# Patient Record
Sex: MALE | Race: WHITE | NOT HISPANIC OR LATINO
[De-identification: names, ages, dates, MRNs, and addresses within clinical notes are randomized per-mention and may not be internally consistent; named-entity substitution may affect disease eponyms.]

---

## 2017-05-05 PROBLEM — Z00.00 ENCOUNTER FOR PREVENTIVE HEALTH EXAMINATION: Status: ACTIVE | Noted: 2017-05-05

## 2017-08-11 ENCOUNTER — APPOINTMENT (OUTPATIENT)
Age: 57
End: 2017-08-11

## 2017-10-12 ENCOUNTER — APPOINTMENT (OUTPATIENT)
Dept: PODIATRY | Facility: CLINIC | Age: 57
End: 2017-10-12

## 2018-10-07 ENCOUNTER — TRANSCRIPTION ENCOUNTER (OUTPATIENT)
Age: 58
End: 2018-10-07

## 2018-10-08 ENCOUNTER — INPATIENT (INPATIENT)
Facility: HOSPITAL | Age: 58
LOS: 15 days | Discharge: HOME | DRG: 853 | End: 2018-10-24
Attending: SURGERY | Admitting: HOSPITALIST
Payer: MEDICAID

## 2018-10-08 ENCOUNTER — RESULT REVIEW (OUTPATIENT)
Age: 58
End: 2018-10-08

## 2018-10-08 VITALS — DIASTOLIC BLOOD PRESSURE: 91 MMHG | SYSTOLIC BLOOD PRESSURE: 149 MMHG

## 2018-10-08 DIAGNOSIS — I48.91 UNSPECIFIED ATRIAL FIBRILLATION: ICD-10-CM

## 2018-10-08 LAB
ABO RH CONFIRMATION: SIGNIFICANT CHANGE UP
ALBUMIN SERPL ELPH-MCNC: 3.1 G/DL — LOW (ref 3.3–5.2)
ALP SERPL-CCNC: 86 U/L — SIGNIFICANT CHANGE UP (ref 40–120)
ALT FLD-CCNC: 6 U/L — SIGNIFICANT CHANGE UP
ANION GAP SERPL CALC-SCNC: 18 MMOL/L — HIGH (ref 5–17)
APTT BLD: 27.2 SEC — LOW (ref 27.5–37.4)
AST SERPL-CCNC: 11 U/L — SIGNIFICANT CHANGE UP
BASOPHILS # BLD AUTO: 0 K/UL — SIGNIFICANT CHANGE UP (ref 0–0.2)
BASOPHILS NFR BLD AUTO: 0.1 % — SIGNIFICANT CHANGE UP (ref 0–2)
BILIRUB SERPL-MCNC: 1.1 MG/DL — SIGNIFICANT CHANGE UP (ref 0.4–2)
BLD GP AB SCN SERPL QL: SIGNIFICANT CHANGE UP
BUN SERPL-MCNC: 12 MG/DL — SIGNIFICANT CHANGE UP (ref 8–20)
CALCIUM SERPL-MCNC: 8.7 MG/DL — SIGNIFICANT CHANGE UP (ref 8.6–10.2)
CHLORIDE SERPL-SCNC: 94 MMOL/L — LOW (ref 98–107)
CK SERPL-CCNC: 31 U/L — SIGNIFICANT CHANGE UP (ref 30–200)
CO2 SERPL-SCNC: 20 MMOL/L — LOW (ref 22–29)
CREAT SERPL-MCNC: 1.47 MG/DL — HIGH (ref 0.5–1.3)
EOSINOPHIL # BLD AUTO: 0 K/UL — SIGNIFICANT CHANGE UP (ref 0–0.5)
EOSINOPHIL NFR BLD AUTO: 0.4 % — SIGNIFICANT CHANGE UP (ref 0–5)
GLUCOSE SERPL-MCNC: 127 MG/DL — HIGH (ref 70–115)
GRAM STN FLD: SIGNIFICANT CHANGE UP
HCT VFR BLD CALC: 46 % — SIGNIFICANT CHANGE UP (ref 42–52)
HGB BLD-MCNC: 13.5 G/DL — LOW (ref 14–18)
INR BLD: 1.32 RATIO — HIGH (ref 0.88–1.16)
LACTATE BLDV-MCNC: 12.7 MMOL/L — CRITICAL HIGH (ref 0.5–2)
LACTATE BLDV-MCNC: 5.7 MMOL/L — CRITICAL HIGH (ref 0.5–2)
LACTATE BLDV-MCNC: 6.5 MMOL/L — CRITICAL HIGH (ref 0.5–2)
LYMPHOCYTES # BLD AUTO: 0.8 K/UL — LOW (ref 1–4.8)
LYMPHOCYTES # BLD AUTO: 11.4 % — LOW (ref 20–55)
MCHC RBC-ENTMCNC: 26.9 PG — LOW (ref 27–31)
MCHC RBC-ENTMCNC: 29.3 G/DL — LOW (ref 32–36)
MCV RBC AUTO: 91.8 FL — SIGNIFICANT CHANGE UP (ref 80–94)
MONOCYTES # BLD AUTO: 0.4 K/UL — SIGNIFICANT CHANGE UP (ref 0–0.8)
MONOCYTES NFR BLD AUTO: 5.5 % — SIGNIFICANT CHANGE UP (ref 3–10)
NEUTROPHILS # BLD AUTO: 5.7 K/UL — SIGNIFICANT CHANGE UP (ref 1.8–8)
NEUTROPHILS NFR BLD AUTO: 82.5 % — HIGH (ref 37–73)
NT-PROBNP SERPL-SCNC: 5008 PG/ML — HIGH (ref 0–300)
PLATELET # BLD AUTO: 441 K/UL — HIGH (ref 150–400)
POTASSIUM SERPL-MCNC: 4.3 MMOL/L — SIGNIFICANT CHANGE UP (ref 3.5–5.3)
POTASSIUM SERPL-SCNC: 4.3 MMOL/L — SIGNIFICANT CHANGE UP (ref 3.5–5.3)
PROT SERPL-MCNC: 6.7 G/DL — SIGNIFICANT CHANGE UP (ref 6.6–8.7)
PROTHROM AB SERPL-ACNC: 14.6 SEC — HIGH (ref 9.8–12.7)
RBC # BLD: 5.01 M/UL — SIGNIFICANT CHANGE UP (ref 4.6–6.2)
RBC # FLD: 16.7 % — HIGH (ref 11–15.6)
SODIUM SERPL-SCNC: 132 MMOL/L — LOW (ref 135–145)
SPECIMEN SOURCE: SIGNIFICANT CHANGE UP
TROPONIN T SERPL-MCNC: 0.01 NG/ML — SIGNIFICANT CHANGE UP (ref 0–0.06)
TYPE + AB SCN PNL BLD: SIGNIFICANT CHANGE UP
WBC # BLD: 6.9 K/UL — SIGNIFICANT CHANGE UP (ref 4.8–10.8)
WBC # FLD AUTO: 6.9 K/UL — SIGNIFICANT CHANGE UP (ref 4.8–10.8)

## 2018-10-08 PROCEDURE — 71275 CT ANGIOGRAPHY CHEST: CPT | Mod: 26

## 2018-10-08 PROCEDURE — 51703 INSERT BLADDER CATH COMPLEX: CPT

## 2018-10-08 PROCEDURE — 47600 CHOLECYSTECTOMY: CPT

## 2018-10-08 PROCEDURE — 99291 CRITICAL CARE FIRST HOUR: CPT | Mod: 25

## 2018-10-08 PROCEDURE — 93010 ELECTROCARDIOGRAM REPORT: CPT

## 2018-10-08 PROCEDURE — 99291 CRITICAL CARE FIRST HOUR: CPT

## 2018-10-08 PROCEDURE — 71045 X-RAY EXAM CHEST 1 VIEW: CPT | Mod: 26

## 2018-10-08 PROCEDURE — 74177 CT ABD & PELVIS W/CONTRAST: CPT | Mod: 26

## 2018-10-08 PROCEDURE — 76937 US GUIDE VASCULAR ACCESS: CPT | Mod: 26

## 2018-10-08 PROCEDURE — 44602 SUTURE SMALL INTESTINE: CPT | Mod: 59

## 2018-10-08 PROCEDURE — 88304 TISSUE EXAM BY PATHOLOGIST: CPT | Mod: 26

## 2018-10-08 PROCEDURE — 36620 INSERTION CATHETER ARTERY: CPT

## 2018-10-08 PROCEDURE — 36556 INSERT NON-TUNNEL CV CATH: CPT

## 2018-10-08 RX ORDER — MORPHINE SULFATE 50 MG/1
4 CAPSULE, EXTENDED RELEASE ORAL EVERY 6 HOURS
Qty: 0 | Refills: 0 | Status: DISCONTINUED | OUTPATIENT
Start: 2018-10-08 | End: 2018-10-08

## 2018-10-08 RX ORDER — AZITHROMYCIN 500 MG/1
500 TABLET, FILM COATED ORAL ONCE
Qty: 0 | Refills: 0 | Status: COMPLETED | OUTPATIENT
Start: 2018-10-08 | End: 2018-10-08

## 2018-10-08 RX ORDER — MORPHINE SULFATE 50 MG/1
6 CAPSULE, EXTENDED RELEASE ORAL ONCE
Qty: 0 | Refills: 0 | Status: DISCONTINUED | OUTPATIENT
Start: 2018-10-08 | End: 2018-10-08

## 2018-10-08 RX ORDER — ACETAMINOPHEN 500 MG
650 TABLET ORAL EVERY 6 HOURS
Qty: 0 | Refills: 0 | Status: DISCONTINUED | OUTPATIENT
Start: 2018-10-08 | End: 2018-10-08

## 2018-10-08 RX ORDER — FUROSEMIDE 40 MG
40 TABLET ORAL
Qty: 0 | Refills: 0 | Status: DISCONTINUED | OUTPATIENT
Start: 2018-10-08 | End: 2018-10-08

## 2018-10-08 RX ORDER — SODIUM CHLORIDE 9 MG/ML
2000 INJECTION INTRAMUSCULAR; INTRAVENOUS; SUBCUTANEOUS ONCE
Qty: 0 | Refills: 0 | Status: COMPLETED | OUTPATIENT
Start: 2018-10-08 | End: 2018-10-08

## 2018-10-08 RX ORDER — CALCIUM CARBONATE 500(1250)
1 TABLET ORAL
Qty: 0 | Refills: 0 | Status: DISCONTINUED | OUTPATIENT
Start: 2018-10-08 | End: 2018-10-08

## 2018-10-08 RX ORDER — CEFTRIAXONE 500 MG/1
1 INJECTION, POWDER, FOR SOLUTION INTRAMUSCULAR; INTRAVENOUS ONCE
Qty: 0 | Refills: 0 | Status: COMPLETED | OUTPATIENT
Start: 2018-10-08 | End: 2018-10-08

## 2018-10-08 RX ORDER — LANOLIN ALCOHOL/MO/W.PET/CERES
5 CREAM (GRAM) TOPICAL AT BEDTIME
Qty: 0 | Refills: 0 | Status: DISCONTINUED | OUTPATIENT
Start: 2018-10-08 | End: 2018-10-08

## 2018-10-08 RX ORDER — FUROSEMIDE 40 MG
40 TABLET ORAL ONCE
Qty: 0 | Refills: 0 | Status: COMPLETED | OUTPATIENT
Start: 2018-10-08 | End: 2018-10-08

## 2018-10-08 RX ORDER — SODIUM CHLORIDE 9 MG/ML
1000 INJECTION INTRAMUSCULAR; INTRAVENOUS; SUBCUTANEOUS
Qty: 0 | Refills: 0 | Status: DISCONTINUED | OUTPATIENT
Start: 2018-10-08 | End: 2018-10-08

## 2018-10-08 RX ORDER — METOPROLOL TARTRATE 50 MG
50 TABLET ORAL
Qty: 0 | Refills: 0 | Status: DISCONTINUED | OUTPATIENT
Start: 2018-10-08 | End: 2018-10-08

## 2018-10-08 RX ORDER — HYDROMORPHONE HYDROCHLORIDE 2 MG/ML
1 INJECTION INTRAMUSCULAR; INTRAVENOUS; SUBCUTANEOUS ONCE
Qty: 0 | Refills: 0 | Status: DISCONTINUED | OUTPATIENT
Start: 2018-10-08 | End: 2018-10-08

## 2018-10-08 RX ORDER — PIPERACILLIN AND TAZOBACTAM 4; .5 G/20ML; G/20ML
4.5 INJECTION, POWDER, LYOPHILIZED, FOR SOLUTION INTRAVENOUS EVERY 8 HOURS
Qty: 0 | Refills: 0 | Status: DISCONTINUED | OUTPATIENT
Start: 2018-10-08 | End: 2018-10-08

## 2018-10-08 RX ORDER — PIPERACILLIN AND TAZOBACTAM 4; .5 G/20ML; G/20ML
3.38 INJECTION, POWDER, LYOPHILIZED, FOR SOLUTION INTRAVENOUS EVERY 8 HOURS
Qty: 0 | Refills: 0 | Status: DISCONTINUED | OUTPATIENT
Start: 2018-10-08 | End: 2018-10-10

## 2018-10-08 RX ORDER — SODIUM CHLORIDE 9 MG/ML
1000 INJECTION INTRAMUSCULAR; INTRAVENOUS; SUBCUTANEOUS ONCE
Qty: 0 | Refills: 0 | Status: DISCONTINUED | OUTPATIENT
Start: 2018-10-08 | End: 2018-10-08

## 2018-10-08 RX ORDER — DILTIAZEM HCL 120 MG
5 CAPSULE, EXT RELEASE 24 HR ORAL
Qty: 125 | Refills: 0 | Status: DISCONTINUED | OUTPATIENT
Start: 2018-10-08 | End: 2018-10-09

## 2018-10-08 RX ORDER — ACETAMINOPHEN 500 MG
975 TABLET ORAL ONCE
Qty: 0 | Refills: 0 | Status: COMPLETED | OUTPATIENT
Start: 2018-10-08 | End: 2018-10-08

## 2018-10-08 RX ADMIN — HYDROMORPHONE HYDROCHLORIDE 1 MILLIGRAM(S): 2 INJECTION INTRAMUSCULAR; INTRAVENOUS; SUBCUTANEOUS at 16:02

## 2018-10-08 RX ADMIN — AZITHROMYCIN 500 MILLIGRAM(S): 500 TABLET, FILM COATED ORAL at 12:22

## 2018-10-08 RX ADMIN — Medication 100 MILLIGRAM(S): at 11:26

## 2018-10-08 RX ADMIN — CEFTRIAXONE 1 GRAM(S): 500 INJECTION, POWDER, FOR SOLUTION INTRAMUSCULAR; INTRAVENOUS at 10:15

## 2018-10-08 RX ADMIN — CEFTRIAXONE 100 GRAM(S): 500 INJECTION, POWDER, FOR SOLUTION INTRAMUSCULAR; INTRAVENOUS at 09:17

## 2018-10-08 RX ADMIN — SODIUM CHLORIDE 1000 MILLILITER(S): 9 INJECTION INTRAMUSCULAR; INTRAVENOUS; SUBCUTANEOUS at 11:20

## 2018-10-08 RX ADMIN — Medication 40 MILLIGRAM(S): at 08:26

## 2018-10-08 RX ADMIN — Medication 600 MILLIGRAM(S): at 11:26

## 2018-10-08 RX ADMIN — MORPHINE SULFATE 6 MILLIGRAM(S): 50 CAPSULE, EXTENDED RELEASE ORAL at 14:22

## 2018-10-08 RX ADMIN — AZITHROMYCIN 255 MILLIGRAM(S): 500 TABLET, FILM COATED ORAL at 10:19

## 2018-10-08 RX ADMIN — Medication 975 MILLIGRAM(S): at 10:27

## 2018-10-08 RX ADMIN — Medication 5 MG/HR: at 08:34

## 2018-10-08 RX ADMIN — Medication 975 MILLIGRAM(S): at 09:16

## 2018-10-08 RX ADMIN — Medication 50 MILLIGRAM(S): at 10:51

## 2018-10-08 NOTE — ED PROVIDER NOTE - CARE PLAN
Principal Discharge DX:	Atrial fibrillation with RVR  Secondary Diagnosis:	Pneumonia of right lower lobe due to infectious organism  Secondary Diagnosis:	Sepsis, due to unspecified organism Principal Discharge DX:	Perforated viscus  Secondary Diagnosis:	Pneumonia of right lower lobe due to infectious organism  Secondary Diagnosis:	Sepsis, due to unspecified organism

## 2018-10-08 NOTE — BRIEF OPERATIVE NOTE - POST-OP DX
Perforated duodenal ulcer  10/08/2018    Active  Kathryn Pizano  Phimosis  10/08/2018    Active  David Raymond
Perforated duodenal ulcer  10/08/2018    Active  Kathryn Pizano

## 2018-10-08 NOTE — H&P ADULT - ASSESSMENT
59 y/o M unknown medical history presents with clinical and radiographic evidence of bowel perforation with abdominal free air. Evidence of severe sepsis on arrival, with fever, tachycardia and signs of end organ dysfunction (TALA, atrial fibrillation with RVR). Patient also has evidence of right lower lobe infiltrate, possibly pneumonia. Lactic acidosis worsening (12.7 from 5.7, despite fluid resuscitation). Due to clinical picture of likely bowel perforation with severe sepsis, patient will be consented for emergent operative exploration.    Plan:  -Admit to [facility], Dr. Branham  -OR for emergent exploratory laparotomy  -Khan catheter placement  -NGT placement  -NPO/IVF resuscitation  -Zosyn IV  -Dispo: SICU expecting patient post-op

## 2018-10-08 NOTE — ED ADULT NURSE NOTE - OBJECTIVE STATEMENT
PT BIBA c/o abdominal pain and unable to move with difficulty breathing. Per pt's significant other pt had difficulty breathing and unable to due to abdominal pain and groin.  Pt's significant other states pt had swollen testicles last week.  Pt with bi-pap in place. O2 saturation 100% Heart rate improving.

## 2018-10-08 NOTE — ED PROVIDER NOTE - OBJECTIVE STATEMENT
58 year old male with no PMH presents with SOb and abd distension. pt states that he has had 3 days of abd distension and then today woke up SOB. Sx are constant, worse with exertion. He denies chest pain, cough, fever, chills, vomiting, diarrhea but does endorse associated leg swelling.

## 2018-10-08 NOTE — BRIEF OPERATIVE NOTE - PROCEDURE
<<-----Click on this checkbox to enter Procedure Urethral catheterization  10/08/2018  complicated  Active  [email or web address]

## 2018-10-08 NOTE — CONSULT NOTE ADULT - SUBJECTIVE AND OBJECTIVE BOX
[facility]                                          [address] , [address], [place] [address]                                               PHONE: [phone]    FAX: [phone] [address], [address], [place] [address]                                       PHONE: [phone]    FAX: [phone]  *******************************************************************************    Reason for Consult: Congestive Heart Failure    HPI:  TAI RUBI is a 58y man with significant history of morbid obesity and smoking who presents to the ER for evaluation of worsening LE swelling and shortness of breath.  The patient states he has not seen a doctor in many years.  He states that he noticed worsening leg swelling over the last several weeks and "went to the drug store and bought a water pill which did not work."  He also "tried keeping legs elevated" but states that this also did not help.  He now reports swelling all the way up to abdomen and significant orthopnea and PND.  No chest pain or palpitations.  No syncope or near syncope.     PAST MEDICAL & SURGICAL HISTORY:  No pertinent past medical history  No significant past surgical history      No Known Allergies      MEDICATIONS  (STANDING):  azithromycin  IVPB 500 milliGRAM(s) IV Intermittent Once  diltiazem Infusion 5 mG/Hr (5 mL/Hr) IV Continuous <Continuous>    MEDICATIONS  (PRN):      Social History: no active tobacco / EtOH / IVDA    Family History: No pertinent family history in first degree relatives      ROS: As noted above, otherwise unremarkable.    Vital Signs Last 24 Hrs  T(C): 38.4 (08 Oct 2018 08:24), Max: 38.4 (08 Oct 2018 08:24)  T(F): 101.1 (08 Oct 2018 08:24), Max: 101.1 (08 Oct 2018 08:24)  HR: 123 (08 Oct 2018 08:24) (123 - 190)  BP: 118/71 (08 Oct 2018 08:24) (118/71 - 149/91)  RR: 26 (08 Oct 2018 08:24) (26 - 30)  SpO2: 100% (08 Oct 2018 08:24) (85% - 100%)     PHYSICAL EXAM:  General: Appears well developed, well nourished, no acute distress  HEENT: Head: normocephalic, atraumatic  Eyes: Pupils equal and reactive  Neck: obese but supple, no carotid bruit, no JVD, no HJR  CARDIOVASCULAR: Irregularly irregular tachycardia; no murmurs appreciated although difficult to auscultate.   LUNGS: Clear to auscultation bilaterally, no rales, rhonchi or wheeze  ABDOMEN: obese but soft, nontender, non-distended, positive bowel sounds, no mass or bruit  EXTREMITIES: 3+ b/l LE pitting edema/anasarca.   SKIN: Warm and dry with normal turgor  NEURO: Alert & oriented x 3, grossly intact  PSYCH: normal mood and affect    LABS:                        13.5   6.9   )-----------( 441      ( 08 Oct 2018 08:26 )             46.0     PT/INR - ( 08 Oct 2018 08:26 )   PT: 14.6 sec;   INR: 1.32 ratio         PTT - ( 08 Oct 2018 08:26 )  PTT:27.2 sec  Serum Pro-Brain Natriuretic Peptide: 5008 pg/mL (10-08 @ 08:26)      RADIOLOGY & ADDITIONAL STUDIES:    ECG: Atrial fibrillation with rapid ventricular rate 180's/min.    Assessment and Plan:  In summary, TAI RUBI is a 58y man with significant history of morbid obesity and smoking seen in the ER for evaluation of worsening LE swelling and shortness of breath, likely secondary to acute (likely on chronic) HF (unknown systolic function), found to be in rapid atrial fibrillation.     - Monitor on telemetry  - Check troponin x3  - Repeat EKG  - Echocardiogram  - Pending results of chemistry, will plan to start IV diuresis with Lasix 80 mg IV q12h for now.  Monitor repeat CXRs, strict I/Os, daily weights, & BUN/Cr closely and titrate PRN. Discussed with ER team.   - No evidence of coronary ischemia clinically.  Troponin pending.  Continue medical management for now pending the results of the above.  Plan for eventual ischemic evaluation, type and timing pending completion of the above work up.   - Rhythm/hemodynamic: rapid atrial fibrillation.  Rate improved on cardizem drip.  Titrate to maintain resting HR <110/min.  Will start metoprolol 50 mg PO bid for now and titrate as tolerated pending echo results.   - Keep K > 4, Mg > 2 (labs pending).   - Check TSH  - Check lipids; pending chemistry results will likely start statin  - Morbid obesity; suspect untreated MELISA.  Will need sleep study in early outpatient setting.    We will follow with you.  Thank you for allowing me to participate in the care of your patient.      Sincerely,    Efrain Hansen M.D.

## 2018-10-08 NOTE — ED ADULT NURSE REASSESSMENT NOTE - NS ED NURSE REASSESS COMMENT FT1
Attempted to place Khan catheter unable to advance catheter into urinary bladder Dr Branham at bedside, aware, per MD will attempt to place when in OR.  The OR is ready for pt waiting for transport

## 2018-10-08 NOTE — ED ADULT TRIAGE NOTE - CHIEF COMPLAINT QUOTE
severe resp distress  +4 piiting edema to all 4 extremities  distended abd severe resp distress  +4 piiting edema to all 4 extremities  distended abd,   dr goel at bedside upon arrival

## 2018-10-08 NOTE — ED PROVIDER NOTE - MEDICAL DECISION MAKING DETAILS
Pt with acute CHF, afib, pna/sepsis. Lactate is downtrending. Will require admission for rate control, Abx, diuresis

## 2018-10-08 NOTE — BRIEF OPERATIVE NOTE - OPERATION/FINDINGS
marked phimosis, redundant prepuce
Perforated duodenal ulcer measuring 8 mm repaired with tara patch, succus throughout abdomen, inflamed gallbladder

## 2018-10-08 NOTE — ED ADULT NURSE REASSESSMENT NOTE - NS ED NURSE REASSESS COMMENT FT1
Pt remains A & OX4, respirations continue to be labored. Pt with bi-pap in place. pt appears anxious. HR slightly improving

## 2018-10-08 NOTE — ED ADULT NURSE REASSESSMENT NOTE - NS ED NURSE REASSESS COMMENT FT1
Pt medicated as per cardiology orders with PO lopressor, tolerated well.  IV Azithromycin continues infusing w/o incident, pt family at bedside

## 2018-10-08 NOTE — H&P ADULT - ATTENDING COMMENTS
Patient seen and examined.   a week of abdominal  pain, now with worsening LA and abdominal pain.  on exam non toxic at initial evaluation, whoever later decompensated.  abdomen distended, obese tympanic, tender but no peritoneal signs  CT with pneumoperitoneum and free fluid, air around duodenum and at GB suspicious for perforated duodenal ulcer.  Plan to take to OR for exploration.  I discussed with patient that this could be lethal if left untreated, discussed procedure, alternatives and benefits.  because we don't fully know his medical history his morbi mortality is elevated in the 30%  '

## 2018-10-08 NOTE — ED PROVIDER NOTE - PROGRESS NOTE DETAILS
We have attempted to obtain CT on the pt on 2 separate occasions. First, he stated his abd pain was too severe t lay flat. Second time, he became acutely dyspnic while laying flat. Will place pt on bipap again in an attempt to obtain CT. Surgery consult called for abd pain in setting of elevated lactate. They do not believe this to be mesenteric ischemia at this time. They will follow and agree with trending lactate.  Hospitalist declines admission until CT has returned. Lactate now significantly worse at 12. Pt is currently in the CT Lactate now significantly worse at 12. Pt is currently in the CT. Surgery called

## 2018-10-08 NOTE — H&P ADULT - NSHPPHYSICALEXAM_GEN_ALL_CORE
General: mild distress, AOx3, uncomfortable on examination  HEENT: PERRLA, EOMI, dry mucous membranes  Neck: supple, nontender  Cardiovascular: regular rate, irregularly irregular rhythm, no murmurs  Respiratory: moderate to severe respiratory distress, dyspneic with short sentences, on bipap. Coarse breath sounds bilaterally.  Abdomen: soft, mild diffuse tenderness. Severe distention with tympany to tap. No guarding or rebound.  Extremities: 3+ bilateral pitting edema. Normal ROM.  Integumentary: warm, no rash  Neuro: no motor or sensory deficits

## 2018-10-08 NOTE — CHART NOTE - NSCHARTNOTEFT_GEN_A_CORE
Pt was given to the hospitalist service but CT A/P & CT angio chest was pending. Pt was appearing to be more tachypneic & dyspneic requiring new BiPAP. Dr. Gordon was  called back who will take back the admission to finish the w/u & call ICU

## 2018-10-08 NOTE — CONSULT NOTE ADULT - ASSESSMENT
58 yr old male w morbid obesity presented to [facility] w fever 101.2, hypoxic 85% on room air w     1) New atrial fib w rapid Ventricular response reported  s/p cardizem 30+20 IV on drip 10 mg   1. admit to telemetry  2. cardiology consulted  3. check lipids in AM  4. delaying statin for today  5. lopressor 50 mg po 2 times a day  6. CHADS-VASC 1;  need weight to begin AC  7. checking Mg    2) acute CHF  1. daily weights  2. lasix 40 mg IV 2 times a day  3. check TSH  4. BNP on 10-10  5. checking B12 and folate    3) Possible PNA on R seen on CXR  with hypoxemia on room air  with metabolic acidosis w elevated lactate  1. received IV bolus  2. w diuresis will improve renal and cardo-pum fx  3. repeat lactate in AM  4. follow up cx  5. procalcitonin today  6. continue zithromax as 500 mg po daily starting in AM  7. IV ceftriaxone 1 gram  8. monitor pulse ox  9. continue supplemental oxygen  10 . will likely need sleep study as outpt    4) acute kidney injury  Cr 1.47 w unknown baseline  likely due to CHF and AF in addition to possibility of infection w PNA  1. monitor    5) morbid obesity  1> Hb A1c  2. TSH  3. lipids as above  4. dietician    VTE  unable to order full AC for AF at this time pending weight 58 yr old male w morbid obesity presented to [facility] w fever 101.2, hypoxic 85% on room air w     1) New atrial fib w rapid Ventricular response reported  s/p cardizem 30+20 IV on drip 10 mg   1. admit to telemetry  2. cardiology consulted  3. check lipids in AM  4. delaying statin for today  5. lopressor 50 mg po 2 times a day  6. CHADS-VASC 1;  need weight to begin AC  7. checking Mg    2) acute CHF  1. daily weights  2. lasix 40 mg IV 2 times a day  3. check TSH  4. BNP on 10-10  5. checking B12 and folate    3) Possible PNA on R seen on CXR  with hypoxemia on room air  with metabolic acidosis w elevated lactate  1. received IV bolus  2. w diuresis will improve renal and cardo-pum fx  3. repeat lactate in AM  4. follow up cx  5. procalcitonin today  6. continue zithromax as 500 mg po daily starting in AM  7. IV ceftriaxone 1 gram  8. monitor pulse ox  9. continue supplemental oxygen  10 . will likely need sleep study as outpt    4) acute kidney injury  Cr 1.47 w unknown baseline  likely due to CHF and AF in addition to possibility of infection w PNA  1. monitor BMP    5) Abdominal pain  may be due to edema/CHF but in setting of atrial fib be concerned w ischemic bowel  pt reported initially unable to tolerate lying down for CT  2. abd US ordered  3. now CT w contrast    ) morbid obesity  1> Hb A1c  2. TSH  3. lipids as above  4. dietician    VTE  unable to order full AC for AF at this time pending weight 58 yr old male w morbid obesity presented to [facility] w fever 101.2, hypoxic 85% on room air w     1) New atrial fib w rapid Ventricular response reported  s/p cardizem 30+20 IV on drip 10 mg   1. admit to telemetry  2. cardiology consulted  3. check lipids in AM  4. delaying statin for today  5. lopressor 50 mg po 2 times a day  6. CHADS-VASC 1;  need weight to begin AC  7. checking Mg    2) acute CHF  1. daily weights  2. lasix 40 mg IV 2 times a day  3. check TSH  4. BNP on 10-10  5. checking B12 and folate    3) Abdominal pain  may be due to edema/CHF but in setting of atrial fib be concerned w ischemic bowel  pt reported initially unable to tolerate lying down for CT  anorexic w nausea and no additional vomiting  2. abd US ordered  3. now CT w contrast  4. spoke w ED to request surgery to see re possible ischemic colitis  5. NPO except meds      4) Possible PNA on R seen on CXR  with hypoxemia on room air  with metabolic acidosis w elevated lactate  1. received IV bolus  2. w diuresis will improve renal and cardo-pum fx  3. repeat lactate in AM  4. follow up cx  5. procalcitonin today  6. continue zithromax as 500 mg po daily starting in AM  7. IV ceftriaxone 1 gram  8. monitor pulse ox  9. continue supplemental oxygen  10 . will likely need sleep study as outpt    5) acute kidney injury  Cr 1.47 w unknown baseline  likely due to CHF and AF in addition to possibility of infection w PNA  1. monitor BMP    6) morbid obesity  1> Hb A1c  2. TSH  3. lipids as above  4. dietician    VTE  deferred until seen by surgery

## 2018-10-08 NOTE — CONSULT NOTE ADULT - SUBJECTIVE AND OBJECTIVE BOX
58 yr old male w morbid obesity and no medical follow up x many years came to [facility] ED by EMS c/o SOB, leg edema and abdominal distension    + several week hx of bilateral leg edema  Went to pharmacy for OTC diuretic (name unknown).  Elevated his legs and edema improved.  Recurred then progressed over the past 7-10 days.  Now up to the abdominal wall  +SOB at rest and increases w exertion  +orthopnea after a few seconds reclining;  has been sleeping upright for 2 days  +PND at night x 2 days  +chills last week and this AM;  no travel hx and no sick contacts    On ED arrival O2 sat 85% on room air w RR 30 /80 w .  He was found in atrial fib.  Had received cardizem IV 30 mg then 20 mg and was placed on IV drip initially at 5 then increased to 10 mg.  Cardiology was consulted and Lactate was 6 He was given 2 L NS, acetaminophen 975 mg.  Inidicated second lactate 5  Initially given BiPAP but was unable to tolerate.  More comfortable on nasal cannula  His exam showed lower extremity edema bilaterally to abdominal wall.          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     Ex-smoker quit 2 months ago;  smoked 2-3 cigarettes on weekends x 10 yrs  Stopped drinking beer 6 months ago;  occasional wine on weekends    Has girlfriend who was present at the bedside      ROS  GEN No fever until in ED today  + chills last week and then this morning, + anorexia for a few days  HEENT has no complaints  Resp + cough productive of clear to light beige sputum + SOB at rest  CARD + occasional palpitations intermittent  + Dyspnea now on minimal exertion progressing to at rest  GI + nausea  + vomiting a few days ago/ vomited his just eaten food, now clear fluid occasonally, + periumbilical pain and LUQ pain when he coughs   urinates"normally"  MSK no s    PHYSICAL EXAM: Tele-evaluation precludes physical exam. Pertinent physical exam findings as per verbal communication by Dr. Bertha hayes bibasilar rales, +pitting edema from lower legs to abdominal wall    Vital Signs Last 24 Hrs  T(C): 37.5 (08 Oct 2018 10:09), Max: 38.4 (08 Oct 2018 08:24)  T(F): 99.5 (08 Oct 2018 10:09), Max: 101.1 (08 Oct 2018 08:24)  HR: 111 (08 Oct 2018 13:30) (111 - 190)  BP: 119/56 (08 Oct 2018 10:19) (118/71 - 149/91)  BP(mean): --  RR: 28 (08 Oct 2018 10:19) (26 - 30)  SpO2: 96% (08 Oct 2018 10:19) (85% - 100%)      LABS AND IMAGING DATA:                        13.5   6.9   )-----------( 441      ( 08 Oct 2018 08:26 )             46.0     10-08    132<L>  |  94<L>  |  12.0  ----------------------------<  127<H>  4.3   |  20.0<L>  |  1.47<H>    Ca    8.7      08 Oct 2018 10:01    TPro  6.7  /  Alb  3.1<L>  /  TBili  1.1  /  DBili  x   /  AST  11  /  ALT  6   /  AlkPhos  86  10-08      LACTATE  6.5,   5.7    BNP 5008  troponin neg x 1      CXR  INTERPRETATION:  History: Dyspnea.    Technique:  AP portable    Comparisons:  none    Findings:     Infiltrates and atelectasis are present at the right lung   base with possible right pleural effusion. The left lung is clear.  .      The pulmonary vasculature and aorta are normal for age. Heart size is   unremarkable.     The thorax is normal for age.    Impression: Pulmonic infiltrates and atelectasis are present in the right   lung base obscuring the right heart border and diaphragm. Possible   pneumonia        Unable to view EKG 58 yr old male w morbid obesity and no medical follow up x many years came to [facility] ED by EMS c/o SOB, leg edema and abdominal distension    + several week hx of bilateral leg edema  Went to pharmacy for OTC diuretic (name unknown).  Elevated his legs and edema improved.  Recurred then progressed over the past 7-10 days.  Now up to the abdominal wall  +SOB at rest and increases w exertion  +orthopnea after a few seconds reclining;  has been sleeping upright for 2 days  +PND at night x 2 days  +chills last week and this AM;  no travel hx and no sick contacts    On ED arrival O2 sat 85% on room air w RR 30 /80 w .  He was found in atrial fib.  Had received cardizem IV 30 mg then 20 mg and was placed on IV drip initially at 5 then increased to 10 mg.  Cardiology was consulted and IV cardizem, IV lasix and po lopressor suggested w echo.   Pt had temp 1012 w  Lactate was 6.   He was given 2 L NS, acetaminophen 975 mg.  Inidicated second lactate 5.7  Initially given BiPAP but was unable to tolerate.  More comfortable on nasal cannula  His exam showed lower extremity edema bilaterally to abdominal wall.          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     Ex-smoker quit 2 months ago;  smoked 2-3 cigarettes on weekends x 10 yrs  Stopped drinking beer 6 months ago;  occasional wine on weekends    Has girlfriend who was present at the bedside      ROS  GEN No fever until in ED today  + chills last week and then this morning, + anorexia for a few days  HEENT has no complaints  Resp + cough productive of clear to light beige sputum + SOB at rest  CARD + occasional palpitations intermittent  + Dyspnea now on minimal exertion progressing to at rest  GI + nausea  + vomiting a few days ago/ vomited his just eaten food, now clear fluid occasionally, + periumbilical pain and LUQ pain when he coughs although he can neither describe the pain for me or otherwise tell me what makes it better or what makes it wordse   urinates"normally"  MSK no joint swellings    PHYSICAL EXAM: Tele-evaluation precludes physical exam. Pertinent physical exam findings as per verbal communication by Dr. Bertha hayes bibasilar rales, +pitting edema from lower legs to abdominal wall    Vital Signs Last 24 Hrs  T(C): 37.5 (08 Oct 2018 10:09), Max: 38.4 (08 Oct 2018 08:24)  T(F): 99.5 (08 Oct 2018 10:09), Max: 101.1 (08 Oct 2018 08:24)  HR: 111 (08 Oct 2018 13:30) (111 - 190)  BP: 119/56 (08 Oct 2018 10:19) (118/71 - 149/91)  BP(mean): --  RR: 28 (08 Oct 2018 10:19) (26 - 30)  SpO2: 96% (08 Oct 2018 10:19) (85% - 100%)      LABS AND IMAGING DATA:                        13.5   6.9   )-----------( 441      ( 08 Oct 2018 08:26 )             46.0     10-08    132<L>  |  94<L>  |  12.0  ----------------------------<  127<H>  4.3   |  20.0<L>  |  1.47<H>    Ca    8.7      08 Oct 2018 10:01    TPro  6.7  /  Alb  3.1<L>  /  TBili  1.1  /  DBili  x   /  AST  11  /  ALT  6   /  AlkPhos  86  10-08      LACTATE  6.5,   5.7    BNP 5008  troponin neg x 1      CXR  INTERPRETATION:  History: Dyspnea.    Technique:  AP portable    Comparisons:  none    Findings:     Infiltrates and atelectasis are present at the right lung   base with possible right pleural effusion. The left lung is clear.  .      The pulmonary vasculature and aorta are normal for age. Heart size is   unremarkable.     The thorax is normal for age.    Impression: Pulmonic infiltrates and atelectasis are present in the right   lung base obscuring the right heart border and diaphragm. Possible   pneumonia      EKG #1  AF w  w premature or aberrant complexes and RAD w low votage    #2  AF w  same as above 58 yr old male w morbid obesity and no medical follow up x many years came to [facility] ED by EMS c/o SOB, leg edema and abdominal distension    + several week hx of bilateral leg edema  Went to pharmacy for OTC diuretic (name unknown).  Elevated his legs and edema improved.  Recurred then progressed over the past 7-10 days.  Now up to the abdominal wall  +SOB at rest and increases w exertion  +orthopnea after a few seconds reclining;  has been sleeping upright for 2 days  +PND at night x 2 days  +chills last week and this AM;  no travel hx and no sick contacts    On ED arrival O2 sat 85% on room air w RR 30 /80 w .  He was found in atrial fib.  Had received cardizem IV 30 mg then 20 mg and was placed on IV drip initially at 5 then increased to 10 mg.  Cardiology was consulted and IV cardizem, IV lasix and po lopressor suggested w echo.   Pt had temp 1012 w  Lactate was 6.   He was given 2 L NS, acetaminophen 975 mg.  Inidicated second lactate 5.7  Initially given BiPAP but was unable to tolerate.  More comfortable on nasal cannula  His exam showed lower extremity edema bilaterally to abdominal wall.          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     Ex-smoker quit 2 months ago;  smoked 2-3 cigarettes on weekends x 10 yrs  Stopped drinking beer 6 months ago;  occasional wine on weekends    Has girlfriend who was present at the bedside      ROS  GEN No fever until in ED today  + chills last week and then this morning, + anorexia for a few days  HEENT has no complaints  Resp + cough productive of clear to light beige sputum + SOB at rest  CARD + occasional palpitations intermittent  + Dyspnea now on minimal exertion progressing to at rest  GI + nausea  + vomiting a few days ago/ vomited his just eaten food, now clear fluid occasionally, + periumbilical pain and LUQ pain when he coughs although he can neither describe the pain for me or otherwise tell me what makes it better or what makes it worse, no diarrhea or constipation   urinates"normally"  MSK no joint swellings    PHYSICAL EXAM: Tele-evaluation precludes physical exam. Pertinent physical exam findings as per verbal communication by Dr. Bertha hayes bibasilar rales, +pitting edema from lower legs to abdominal wall    Vital Signs Last 24 Hrs  T(C): 37.5 (08 Oct 2018 10:09), Max: 38.4 (08 Oct 2018 08:24)  T(F): 99.5 (08 Oct 2018 10:09), Max: 101.1 (08 Oct 2018 08:24)  HR: 111 (08 Oct 2018 13:30) (111 - 190)  BP: 119/56 (08 Oct 2018 10:19) (118/71 - 149/91)  BP(mean): --  RR: 28 (08 Oct 2018 10:19) (26 - 30)  SpO2: 96% (08 Oct 2018 10:19) (85% - 100%)      LABS AND IMAGING DATA:                        13.5   6.9   )-----------( 441      ( 08 Oct 2018 08:26 )             46.0     10-08    132<L>  |  94<L>  |  12.0  ----------------------------<  127<H>  4.3   |  20.0<L>  |  1.47<H>    Ca    8.7      08 Oct 2018 10:01    TPro  6.7  /  Alb  3.1<L>  /  TBili  1.1  /  DBili  x   /  AST  11  /  ALT  6   /  AlkPhos  86  10-08      LACTATE  6.5,   5.7    BNP 5008  troponin neg x 1      CXR  INTERPRETATION:  History: Dyspnea.    Technique:  AP portable    Comparisons:  none    Findings:     Infiltrates and atelectasis are present at the right lung   base with possible right pleural effusion. The left lung is clear.  .      The pulmonary vasculature and aorta are normal for age. Heart size is   unremarkable.     The thorax is normal for age.    Impression: Pulmonic infiltrates and atelectasis are present in the right   lung base obscuring the right heart border and diaphragm. Possible   pneumonia      EKG #1  AF w  w premature or aberrant complexes and RAD w low votage    #2  AF w  same as above

## 2018-10-08 NOTE — PROGRESS NOTE ADULT - SUBJECTIVE AND OBJECTIVE BOX
ICU Progress Note    HPI:    S:    Pt seen and examined  HD # 2  Pt here for abdominal pain  No stated PMHx; however, Pt has not been to a physician in many years    w/u reveals perforated DU, lactic acidosis, OCHOA, TALA    s/p OR; ex-lap, Skyler patch repair, open cholecystectomy    Now in SICU  Remains critically ill on pressors, ventilated  Being actively resuscitated      Allergies    No Known Allergies    Intolerances        MEDICATIONS  (STANDING):  chlorhexidine 0.12% Liquid 15 milliLiter(s) Swish and Spit two times a day  fentaNYL   Infusion 1.3 MICROgram(s)/kG/Hr (19.227 mL/Hr) IV Continuous <Continuous>  magnesium sulfate  IVPB 2 Gram(s) IV Intermittent once  multiple electrolytes Injection Type 1 1000 milliLiter(s) (250 mL/Hr) IV Continuous <Continuous>  multiple electrolytes Injection Type 1 Bolus 1000 milliLiter(s) IV Bolus once  norepinephrine Infusion 0.05 MICROgram(s)/kG/Min (6.933 mL/Hr) IV Continuous <Continuous>  pantoprazole  Injectable 40 milliGRAM(s) IV Push two times a day  piperacillin/tazobactam IVPB. 3.375 Gram(s) IV Intermittent every 8 hours  propofol Infusion 10 MICROgram(s)/kG/Min (8.874 mL/Hr) IV Continuous <Continuous>    MEDICATIONS  (PRN):      Drug Dosing Weight    Weight (kg): 147.9 (08 Oct 2018 23:21)      PAST MEDICAL & SURGICAL HISTORY:  No pertinent past medical history  No significant past surgical history      FAMILY HISTORY:  No pertinent family history in first degree relatives          ROS: See HPI; otherwise, all systems reviewed and negative.    O:    ICU Vital Signs Last 24 Hrs  T(C): 37.5 (08 Oct 2018 10:09), Max: 38.4 (08 Oct 2018 08:24)  T(F): 99.5 (08 Oct 2018 10:09), Max: 101.1 (08 Oct 2018 08:24)  HR: 71 (09 Oct 2018 00:48) (71 - 190)  BP: 105/65 (08 Oct 2018 23:21) (90/50 - 149/91)  BP(mean): 78 (08 Oct 2018 23:21) (78 - 78)  ABP: 96/66 (08 Oct 2018 23:21) (96/66 - 96/66)  ABP(mean): 78 (08 Oct 2018 23:21) (78 - 78)  RR: 17 (08 Oct 2018 23:21) (17 - 30)  SpO2: 97% (09 Oct 2018 00:48) (85% - 100%)      ABG - ( 09 Oct 2018 00:30 )  pH, Arterial: 7.31  pH, Blood: x     /  pCO2: 38    /  pO2: 78    / HCO3: 19    / Base Excess: -6.4  /  SaO2: 95                  I&O's Detail      Mode: AC/ CMV (Assist Control/ Continuous Mandatory Ventilation)  RR (machine): 16  TV (machine): 600  FiO2: 50  PEEP: 8  MAP: 14  PIP: 26      PE:    Constitutional: Adult M lying in bed  Neck: No JVD, trachea midline. + R SC TLC, R IJ cordis noted.  Respiratory: CTA B/L good BS B/L no W/R/R.  Cardiovascular: S1S2+ irregular no M/R/G.  Gastrointestinal: Obese, soft. + midline ex-lap wound appreciated, + RLQ LON noted.  Extremities: No peripheral edema, No cyanosis, clubbing.  Neurological: Intubated, sedated.  Skin: Chronic appearing thickened skin B/L legs.     LABS:    CBC Full  -  ( 09 Oct 2018 00:27 )  WBC Count : 14.2 K/uL  Hemoglobin : 12.2 g/dL  Hematocrit : 42.2 %  Platelet Count - Automated : 308 K/uL  Mean Cell Volume : 91.9 fl  Mean Cell Hemoglobin : 26.6 pg  Mean Cell Hemoglobin Concentration : 28.9 g/dL  Auto Neutrophil # : x  Auto Lymphocyte # : x  Auto Monocyte # : x  Auto Eosinophil # : x  Auto Basophil # : x  Auto Neutrophil % : x  Auto Lymphocyte % : x  Auto Monocyte % : x  Auto Eosinophil % : x  Auto Basophil % : x    10-09    132<L>  |  95<L>  |  16.0  ----------------------------<  118<H>  5.2   |  18.0<L>  |  2.12<H>    Ca    8.6      09 Oct 2018 00:27  Phos  4.9     10-09  Mg     1.6     10-09    TPro  5.3<L>  /  Alb  2.5<L>  /  TBili  1.5  /  DBili  x   /  AST  461<H>  /  ALT  196<H>  /  AlkPhos  59  10-09    PT/INR - ( 09 Oct 2018 00:27 )   PT: 21.8 sec;   INR: 1.95 ratio      POCUS: Globally low normal EF. No focal hypokinesis. No effusion. IVC <2cm, btn 50 and 100% resp collapse.  PTT - ( 09 Oct 2018 00:27 )  PTT:34.1 sec    CAPILLARY BLOOD GLUCOSE        CARDIAC MARKERS ( 08 Oct 2018 10:01 )  x     / 0.01 ng/mL / 31 U/L / x     / x          LIVER FUNCTIONS - ( 09 Oct 2018 00:27 )  Alb: 2.5 g/dL / Pro: 5.3 g/dL / ALK PHOS: 59 U/L / ALT: 196 U/L / AST: 461 U/L / GGT: x

## 2018-10-08 NOTE — ED ADULT NURSE NOTE - CHIEF COMPLAINT QUOTE
severe resp distress  +4 piiting edema to all 4 extremities  distended abd,   dr goel at bedside upon arrival

## 2018-10-08 NOTE — H&P ADULT - HISTORY OF PRESENT ILLNESS
57 y/o M with no medical care and unknown medical problems presents to ED with dyspnea and vague abdominal pain. Patient states for the past week he has noticed worsening exertional dyspnea, and now has dyspnea at rest. No reported chest pain. Patient also states that about 3 days ago he started noticing generalized abdominal pain that shifted to the left abdomen today. Pain is described as dull ache, that is constant in nature. No provoking or alleviating factors. Associated nausea but no episodes of vomiting. No reported fever or chills. Last bowel movement 2 days ago. Passing flatus normally. Decreased appetite over the past month.     PMH: none reported  PSH: none  Medications: none  Allergies: NKDA, NKA  Social: former smoker (3-4 cigarettes over the weekend), quit 2 months ago. Drinks wine over the weekend, quit drinking beer 6 months ago. No illicit drug use.

## 2018-10-08 NOTE — PROGRESS NOTE ADULT - ASSESSMENT
A:    58yMale  HD # 2    Here for:    1. Shock, 2/2  ---> Suspect hypovolemic + septic  2. Perforated viscus/DU ulcer @D1  3. Dehydration  4. TALA, suspect pre-renal, maybe component ATN from hypoperfusion  5. Lactic acidosis  6. OCHOA    This patient requires critical care for support of one or more vital organ systems with a high probability of imminent or life threatening deterioration in his/her condition    P:    Analgosedation. Start fentanyl drip and wean off propofol (started intraop), as vasodilatory and negative inotropic properties will not be beneficial at his time. HD monitoring; shut off doutamine (started in OR) as not convinced Pt in cardiogenic shock; ScVO2 also does not support this, and dobutamine will exacerbate ectopy and vasodilation. Cont levophed, wean as able to maintain MAP > 65. TTE ordered. Flotrac. Cont MV, titrating PEEP and FiO2 to optimize oxygenation and ventilation. Give 1L IVF bolus for continued LA (although improved), hypovolemia, high rate maintenance fluid. Give 2u FFP for INR ~2, and beneficial osmotic properties of colloid in continued resuscitative phase. d/c SC CVC as dirty, use full sterile Cordis. Maintain danni. Cont zosyn, PPI for presumptive H. pylori Tx. NGT to suction, LON to drainage, please quantify. Maintain armstrong (placed by ), all deep lines.     Dispo: Cont critical care.    TOTAL CRITICAL CARE TIME: 100 minutes  (EXCLUSIVE of any non bundled procedures)    Note: This time spent INCLUDES time spent directly as this patient's bedside with evaluation, review of chart including review of laboratory and imaging studies, interpretation of vital signs and cardiac output measurements, any necessary ventilator management, and time spent discussing plan of care with patient and family, including goals of care discussion. A:    58yMale  HD # 2    Here for:    1. Shock, 2/2  ---> Suspect hypovolemic + septic  2. Perforated viscus/DU ulcer @D1  3. Dehydration  4. TALA, suspect pre-renal, maybe component ATN from hypoperfusion  5. Lactic acidosis  6. OCHOA    This patient requires critical care for support of one or more vital organ systems with a high probability of imminent or life threatening deterioration in his/her condition    P:    Analgosedation. Start fentanyl drip and wean off propofol (started intraop), as vasodilatory and negative inotropic properties will not be beneficial at his time. HD monitoring; shut off doutamine (started in OR) as not convinced Pt in cardiogenic shock; ScVO2 also does not support this, and dobutamine will exacerbate ectopy and vasodilation. Cont levophed, wean as able to maintain MAP > 65. TTE ordered. Flotrac. Cont MV, titrating PEEP and FiO2 to optimize oxygenation and ventilation. Give 1L IVF bolus for continued LA (although improved), hypovolemia, high rate maintenance fluid. Give 2u FFP for INR ~2, and beneficial osmotic properties of colloid in continued resuscitative phase. d/c SC CVC as dirty, use full sterile Cordis. Maintain danni. Cont zosyn, PPI for presumptive H. pylori Tx. NGT to suction, LON to drainage, please quantify. Maintain armstrong (placed by ), all deep lines. Obesity (BID) lovenox for VTE prevention.     Dispo: Cont critical care.    TOTAL CRITICAL CARE TIME: 100 minutes  (EXCLUSIVE of any non bundled procedures)    Note: This time spent INCLUDES time spent directly as this patient's bedside with evaluation, review of chart including review of laboratory and imaging studies, interpretation of vital signs and cardiac output measurements, any necessary ventilator management, and time spent discussing plan of care with patient and family, including goals of care discussion.

## 2018-10-08 NOTE — BRIEF OPERATIVE NOTE - PROCEDURE
<<-----Click on this checkbox to enter Procedure Exploratory laparotomy  10/08/2018  Exploratory laparotomy, taar patch of duodenal ulcer, open cholecystectomy  Active  Formerly [facility]

## 2018-10-08 NOTE — ED ADULT NURSE REASSESSMENT NOTE - NS ED NURSE REASSESS COMMENT FT1
Pt coughing up phlegm and concern for aspiration, Dr Gordon notified and at bedside Pt removed from bi-pap and placed on 4L O2 nasal canula.  Tolerating better.  Pt's O2 saturation on nasal canula is between 94- 96% Pt's dose of cardizem increased per Dr Larsen - 10mg/hr Pt coughing up phlegm and concern for aspiration, Dr Gordon notified and at bedside Pt removed from bi-pap and placed on 4L O2 nasal canula.  Tolerating better.  Pt's O2 saturation on nasal canula is between 94- 96% Pt's dose of Cardizem increased per Dr Gordon - 10mg/hr

## 2018-10-08 NOTE — BRIEF OPERATIVE NOTE - PRE-OP DX
Perforated duodenal ulcer  10/08/2018    Active  Kathryn Pizano  Phimosis  10/08/2018    Active  David Ryamond
Perforated duodenal ulcer  10/08/2018    Active  Kathryn Pizano

## 2018-10-08 NOTE — CONSULT NOTE ADULT - ATTENDING COMMENTS
med rec done    consult re PT, case management and  re discharge planning  dietician      need to see EKG  check weight for AC med rec done    consult re PT, case management and  re discharge planning  dietician      SPoke w ED re expediting CT and requesting surgery to see re ischemic colitis  +AF +abd pain  stool guaiac unknown  No anticoagulation until surgery evaluates pt given bleeding risk  left voicemail for his nurse re poss diagnosis

## 2018-10-09 LAB
ALBUMIN SERPL ELPH-MCNC: 2.5 G/DL — LOW (ref 3.3–5.2)
ALP SERPL-CCNC: 59 U/L — SIGNIFICANT CHANGE UP (ref 40–120)
ALT FLD-CCNC: 196 U/L — HIGH
ANION GAP SERPL CALC-SCNC: 14 MMOL/L — SIGNIFICANT CHANGE UP (ref 5–17)
ANION GAP SERPL CALC-SCNC: 18 MMOL/L — HIGH (ref 5–17)
ANION GAP SERPL CALC-SCNC: 19 MMOL/L — HIGH (ref 5–17)
APPEARANCE UR: ABNORMAL
APTT BLD: 34.1 SEC — SIGNIFICANT CHANGE UP (ref 27.5–37.4)
APTT BLD: 36.9 SEC — SIGNIFICANT CHANGE UP (ref 27.5–37.4)
AST SERPL-CCNC: 461 U/L — HIGH
BACTERIA # UR AUTO: ABNORMAL
BASE EXCESS BLDA CALC-SCNC: -3 MMOL/L — LOW (ref -2–2)
BASE EXCESS BLDV CALC-SCNC: -6.5 MMOL/L — LOW (ref -2–2)
BILIRUB SERPL-MCNC: 1.5 MG/DL — SIGNIFICANT CHANGE UP (ref 0.4–2)
BILIRUB UR-MCNC: NEGATIVE — SIGNIFICANT CHANGE UP
BLOOD GAS COMMENTS ARTERIAL: SIGNIFICANT CHANGE UP
BUN SERPL-MCNC: 16 MG/DL — SIGNIFICANT CHANGE UP (ref 8–20)
BUN SERPL-MCNC: 18 MG/DL — SIGNIFICANT CHANGE UP (ref 8–20)
BUN SERPL-MCNC: 21 MG/DL — HIGH (ref 8–20)
CA-I SERPL-SCNC: 1.06 MMOL/L — LOW (ref 1.15–1.33)
CALCIUM SERPL-MCNC: 8.4 MG/DL — LOW (ref 8.6–10.2)
CALCIUM SERPL-MCNC: 8.5 MG/DL — LOW (ref 8.6–10.2)
CALCIUM SERPL-MCNC: 8.6 MG/DL — SIGNIFICANT CHANGE UP (ref 8.6–10.2)
CHLORIDE BLDV-SCNC: 100 MMOL/L — SIGNIFICANT CHANGE UP (ref 98–107)
CHLORIDE SERPL-SCNC: 95 MMOL/L — LOW (ref 98–107)
CHLORIDE SERPL-SCNC: 96 MMOL/L — LOW (ref 98–107)
CHLORIDE SERPL-SCNC: 97 MMOL/L — LOW (ref 98–107)
CHLORIDE UR-SCNC: <27 MMOL/L — SIGNIFICANT CHANGE UP
CO2 SERPL-SCNC: 18 MMOL/L — LOW (ref 22–29)
CO2 SERPL-SCNC: 18 MMOL/L — LOW (ref 22–29)
CO2 SERPL-SCNC: 24 MMOL/L — SIGNIFICANT CHANGE UP (ref 22–29)
COLOR SPEC: YELLOW — SIGNIFICANT CHANGE UP
CREAT ?TM UR-MCNC: 119 MG/DL — SIGNIFICANT CHANGE UP
CREAT SERPL-MCNC: 2.05 MG/DL — HIGH (ref 0.5–1.3)
CREAT SERPL-MCNC: 2.12 MG/DL — HIGH (ref 0.5–1.3)
CREAT SERPL-MCNC: 2.31 MG/DL — HIGH (ref 0.5–1.3)
DIFF PNL FLD: ABNORMAL
EPI CELLS # UR: SIGNIFICANT CHANGE UP
GAS PNL BLDA: SIGNIFICANT CHANGE UP
GAS PNL BLDV: 129 MMOL/L — LOW (ref 135–145)
GAS PNL BLDV: SIGNIFICANT CHANGE UP
GAS PNL BLDV: SIGNIFICANT CHANGE UP
GLUCOSE BLDV-MCNC: 117 MG/DL — HIGH (ref 70–99)
GLUCOSE SERPL-MCNC: 118 MG/DL — HIGH (ref 70–115)
GLUCOSE SERPL-MCNC: 126 MG/DL — HIGH (ref 70–115)
GLUCOSE SERPL-MCNC: 144 MG/DL — HIGH (ref 70–115)
GLUCOSE UR QL: 50 MG/DL
HCO3 BLDA-SCNC: 22 MMOL/L — SIGNIFICANT CHANGE UP (ref 20–26)
HCO3 BLDV-SCNC: 18 MMOL/L — LOW (ref 21–29)
HCT VFR BLD CALC: 36.2 % — LOW (ref 42–52)
HCT VFR BLD CALC: 42.2 % — SIGNIFICANT CHANGE UP (ref 42–52)
HCT VFR BLDA CALC: 42 — SIGNIFICANT CHANGE UP (ref 39–50)
HGB BLD CALC-MCNC: 13.7 G/DL — SIGNIFICANT CHANGE UP (ref 13–17)
HGB BLD-MCNC: 11.2 G/DL — LOW (ref 14–18)
HGB BLD-MCNC: 12.2 G/DL — LOW (ref 14–18)
HOROWITZ INDEX BLDA+IHG-RTO: 50 — SIGNIFICANT CHANGE UP
INR BLD: 1.88 RATIO — HIGH (ref 0.88–1.16)
INR BLD: 1.95 RATIO — HIGH (ref 0.88–1.16)
KETONES UR-MCNC: NEGATIVE — SIGNIFICANT CHANGE UP
LACTATE BLDV-MCNC: 6.3 MMOL/L — CRITICAL HIGH (ref 0.5–2)
LACTATE SERPL-SCNC: 2.9 MMOL/L — HIGH (ref 0.5–2)
LACTATE SERPL-SCNC: 3.4 MMOL/L — HIGH (ref 0.5–2)
LACTATE SERPL-SCNC: 5.9 MMOL/L — CRITICAL HIGH (ref 0.5–2)
LEUKOCYTE ESTERASE UR-ACNC: ABNORMAL
MAGNESIUM SERPL-MCNC: 1.6 MG/DL — SIGNIFICANT CHANGE UP (ref 1.6–2.6)
MAGNESIUM SERPL-MCNC: 2.3 MG/DL — SIGNIFICANT CHANGE UP (ref 1.6–2.6)
MAGNESIUM SERPL-MCNC: 2.6 MG/DL — SIGNIFICANT CHANGE UP (ref 1.6–2.6)
MCHC RBC-ENTMCNC: 26.6 PG — LOW (ref 27–31)
MCHC RBC-ENTMCNC: 27.3 PG — SIGNIFICANT CHANGE UP (ref 27–31)
MCHC RBC-ENTMCNC: 28.9 G/DL — LOW (ref 32–36)
MCHC RBC-ENTMCNC: 30.9 G/DL — LOW (ref 32–36)
MCV RBC AUTO: 88.1 FL — SIGNIFICANT CHANGE UP (ref 80–94)
MCV RBC AUTO: 91.9 FL — SIGNIFICANT CHANGE UP (ref 80–94)
NITRITE UR-MCNC: NEGATIVE — SIGNIFICANT CHANGE UP
OTHER CELLS CSF MANUAL: 12 ML/DL — LOW (ref 18–22)
PCO2 BLDA: 34 MMHG — LOW (ref 35–45)
PCO2 BLDV: 45 MMHG — SIGNIFICANT CHANGE UP (ref 35–50)
PH BLDA: 7.4 — SIGNIFICANT CHANGE UP (ref 7.35–7.45)
PH BLDV: 7.26 — LOW (ref 7.32–7.43)
PH UR: 5 — SIGNIFICANT CHANGE UP (ref 5–8)
PHOSPHATE SERPL-MCNC: 4.9 MG/DL — HIGH (ref 2.4–4.7)
PHOSPHATE SERPL-MCNC: 5 MG/DL — HIGH (ref 2.4–4.7)
PHOSPHATE SERPL-MCNC: 6 MG/DL — HIGH (ref 2.4–4.7)
PLATELET # BLD AUTO: 308 K/UL — SIGNIFICANT CHANGE UP (ref 150–400)
PLATELET # BLD AUTO: 311 K/UL — SIGNIFICANT CHANGE UP (ref 150–400)
PO2 BLDA: 131 MMHG — HIGH (ref 83–108)
PO2 BLDV: 40 MMHG — SIGNIFICANT CHANGE UP (ref 25–45)
POTASSIUM BLDV-SCNC: 6.1 MMOL/L — HIGH (ref 3.4–4.5)
POTASSIUM SERPL-MCNC: 5.2 MMOL/L — SIGNIFICANT CHANGE UP (ref 3.5–5.3)
POTASSIUM SERPL-MCNC: 5.2 MMOL/L — SIGNIFICANT CHANGE UP (ref 3.5–5.3)
POTASSIUM SERPL-MCNC: 5.4 MMOL/L — HIGH (ref 3.5–5.3)
POTASSIUM SERPL-SCNC: 5.2 MMOL/L — SIGNIFICANT CHANGE UP (ref 3.5–5.3)
POTASSIUM SERPL-SCNC: 5.2 MMOL/L — SIGNIFICANT CHANGE UP (ref 3.5–5.3)
POTASSIUM SERPL-SCNC: 5.4 MMOL/L — HIGH (ref 3.5–5.3)
PROT SERPL-MCNC: 5.3 G/DL — LOW (ref 6.6–8.7)
PROT UR-MCNC: 100 MG/DL
PROTHROM AB SERPL-ACNC: 21 SEC — HIGH (ref 9.8–12.7)
PROTHROM AB SERPL-ACNC: 21.8 SEC — HIGH (ref 9.8–12.7)
RBC # BLD: 4.11 M/UL — LOW (ref 4.6–6.2)
RBC # BLD: 4.59 M/UL — LOW (ref 4.6–6.2)
RBC # FLD: 16.4 % — HIGH (ref 11–15.6)
RBC # FLD: 16.6 % — HIGH (ref 11–15.6)
RBC CASTS # UR COMP ASSIST: ABNORMAL /HPF (ref 0–4)
SAO2 % BLDA: 100 % — HIGH (ref 95–99)
SAO2 % BLDV: 65 % — SIGNIFICANT CHANGE UP
SODIUM SERPL-SCNC: 132 MMOL/L — LOW (ref 135–145)
SODIUM SERPL-SCNC: 133 MMOL/L — LOW (ref 135–145)
SODIUM SERPL-SCNC: 134 MMOL/L — LOW (ref 135–145)
SODIUM UR-SCNC: 43 MMOL/L — SIGNIFICANT CHANGE UP
SP GR SPEC: 1.02 — SIGNIFICANT CHANGE UP (ref 1.01–1.02)
UROBILINOGEN FLD QL: 1 MG/DL
WBC # BLD: 12.6 K/UL — HIGH (ref 4.8–10.8)
WBC # BLD: 14.2 K/UL — HIGH (ref 4.8–10.8)
WBC # FLD AUTO: 12.6 K/UL — HIGH (ref 4.8–10.8)
WBC # FLD AUTO: 14.2 K/UL — HIGH (ref 4.8–10.8)
WBC UR QL: SIGNIFICANT CHANGE UP

## 2018-10-09 PROCEDURE — 71045 X-RAY EXAM CHEST 1 VIEW: CPT | Mod: 26

## 2018-10-09 PROCEDURE — 93306 TTE W/DOPPLER COMPLETE: CPT | Mod: 26

## 2018-10-09 PROCEDURE — 93010 ELECTROCARDIOGRAM REPORT: CPT

## 2018-10-09 RX ORDER — CHLORHEXIDINE GLUCONATE 213 G/1000ML
15 SOLUTION TOPICAL
Qty: 0 | Refills: 0 | Status: DISCONTINUED | OUTPATIENT
Start: 2018-10-09 | End: 2018-10-09

## 2018-10-09 RX ORDER — ALBUMIN HUMAN 25 %
100 VIAL (ML) INTRAVENOUS ONCE
Qty: 0 | Refills: 0 | Status: COMPLETED | OUTPATIENT
Start: 2018-10-10 | End: 2018-10-10

## 2018-10-09 RX ORDER — PROPOFOL 10 MG/ML
10 INJECTION, EMULSION INTRAVENOUS
Qty: 1000 | Refills: 0 | Status: DISCONTINUED | OUTPATIENT
Start: 2018-10-09 | End: 2018-10-09

## 2018-10-09 RX ORDER — HYDROMORPHONE HYDROCHLORIDE 2 MG/ML
1 INJECTION INTRAMUSCULAR; INTRAVENOUS; SUBCUTANEOUS
Qty: 0 | Refills: 0 | Status: DISCONTINUED | OUTPATIENT
Start: 2018-10-09 | End: 2018-10-16

## 2018-10-09 RX ORDER — CHLORHEXIDINE GLUCONATE 213 G/1000ML
1 SOLUTION TOPICAL DAILY
Qty: 0 | Refills: 0 | Status: DISCONTINUED | OUTPATIENT
Start: 2018-10-09 | End: 2018-10-24

## 2018-10-09 RX ORDER — FENTANYL CITRATE 50 UG/ML
1.5 INJECTION INTRAVENOUS
Qty: 2500 | Refills: 0 | Status: DISCONTINUED | OUTPATIENT
Start: 2018-10-09 | End: 2018-10-09

## 2018-10-09 RX ORDER — FENTANYL CITRATE 50 UG/ML
1.3 INJECTION INTRAVENOUS
Qty: 5000 | Refills: 0 | Status: DISCONTINUED | OUTPATIENT
Start: 2018-10-09 | End: 2018-10-09

## 2018-10-09 RX ORDER — ALBUMIN HUMAN 25 %
100 VIAL (ML) INTRAVENOUS ONCE
Qty: 0 | Refills: 0 | Status: COMPLETED | OUTPATIENT
Start: 2018-10-09 | End: 2018-10-09

## 2018-10-09 RX ORDER — SODIUM CHLORIDE 9 MG/ML
1000 INJECTION, SOLUTION INTRAVENOUS ONCE
Qty: 0 | Refills: 0 | Status: COMPLETED | OUTPATIENT
Start: 2018-10-09 | End: 2018-10-09

## 2018-10-09 RX ORDER — PANTOPRAZOLE SODIUM 20 MG/1
40 TABLET, DELAYED RELEASE ORAL
Qty: 0 | Refills: 0 | Status: DISCONTINUED | OUTPATIENT
Start: 2018-10-09 | End: 2018-10-11

## 2018-10-09 RX ORDER — SODIUM CHLORIDE 9 MG/ML
500 INJECTION, SOLUTION INTRAVENOUS ONCE
Qty: 0 | Refills: 0 | Status: DISCONTINUED | OUTPATIENT
Start: 2018-10-09 | End: 2018-10-09

## 2018-10-09 RX ORDER — ENOXAPARIN SODIUM 100 MG/ML
40 INJECTION SUBCUTANEOUS
Qty: 0 | Refills: 0 | Status: DISCONTINUED | OUTPATIENT
Start: 2018-10-09 | End: 2018-10-09

## 2018-10-09 RX ORDER — HYDROMORPHONE HYDROCHLORIDE 2 MG/ML
0.5 INJECTION INTRAMUSCULAR; INTRAVENOUS; SUBCUTANEOUS
Qty: 0 | Refills: 0 | Status: DISCONTINUED | OUTPATIENT
Start: 2018-10-09 | End: 2018-10-16

## 2018-10-09 RX ORDER — METOPROLOL TARTRATE 50 MG
5 TABLET ORAL ONCE
Qty: 0 | Refills: 0 | Status: COMPLETED | OUTPATIENT
Start: 2018-10-09 | End: 2018-10-09

## 2018-10-09 RX ORDER — NOREPINEPHRINE BITARTRATE/D5W 8 MG/250ML
0.05 PLASTIC BAG, INJECTION (ML) INTRAVENOUS
Qty: 16 | Refills: 0 | Status: DISCONTINUED | OUTPATIENT
Start: 2018-10-09 | End: 2018-10-09

## 2018-10-09 RX ORDER — SODIUM CHLORIDE 9 MG/ML
1000 INJECTION, SOLUTION INTRAVENOUS
Qty: 0 | Refills: 0 | Status: DISCONTINUED | OUTPATIENT
Start: 2018-10-08 | End: 2018-10-10

## 2018-10-09 RX ORDER — HEPARIN SODIUM 5000 [USP'U]/ML
7500 INJECTION INTRAVENOUS; SUBCUTANEOUS EVERY 8 HOURS
Qty: 0 | Refills: 0 | Status: DISCONTINUED | OUTPATIENT
Start: 2018-10-09 | End: 2018-10-10

## 2018-10-09 RX ORDER — MAGNESIUM SULFATE 500 MG/ML
2 VIAL (ML) INJECTION ONCE
Qty: 0 | Refills: 0 | Status: COMPLETED | OUTPATIENT
Start: 2018-10-09 | End: 2018-10-09

## 2018-10-09 RX ADMIN — CHLORHEXIDINE GLUCONATE 15 MILLILITER(S): 213 SOLUTION TOPICAL at 05:07

## 2018-10-09 RX ADMIN — Medication 6.93 MICROGRAM(S)/KG/MIN: at 02:05

## 2018-10-09 RX ADMIN — Medication 5 MILLIGRAM(S): at 20:57

## 2018-10-09 RX ADMIN — HEPARIN SODIUM 7500 UNIT(S): 5000 INJECTION INTRAVENOUS; SUBCUTANEOUS at 22:24

## 2018-10-09 RX ADMIN — PANTOPRAZOLE SODIUM 40 MILLIGRAM(S): 20 TABLET, DELAYED RELEASE ORAL at 05:07

## 2018-10-09 RX ADMIN — PIPERACILLIN AND TAZOBACTAM 25 GRAM(S): 4; .5 INJECTION, POWDER, LYOPHILIZED, FOR SOLUTION INTRAVENOUS at 13:04

## 2018-10-09 RX ADMIN — PIPERACILLIN AND TAZOBACTAM 25 GRAM(S): 4; .5 INJECTION, POWDER, LYOPHILIZED, FOR SOLUTION INTRAVENOUS at 05:07

## 2018-10-09 RX ADMIN — ENOXAPARIN SODIUM 40 MILLIGRAM(S): 100 INJECTION SUBCUTANEOUS at 05:07

## 2018-10-09 RX ADMIN — Medication 50 MILLILITER(S): at 22:17

## 2018-10-09 RX ADMIN — HYDROMORPHONE HYDROCHLORIDE 0.5 MILLIGRAM(S): 2 INJECTION INTRAMUSCULAR; INTRAVENOUS; SUBCUTANEOUS at 20:07

## 2018-10-09 RX ADMIN — PROPOFOL 8.87 MICROGRAM(S)/KG/MIN: 10 INJECTION, EMULSION INTRAVENOUS at 02:05

## 2018-10-09 RX ADMIN — Medication 50 GRAM(S): at 02:06

## 2018-10-09 RX ADMIN — FENTANYL CITRATE 22.18 MICROGRAM(S)/KG/HR: 50 INJECTION INTRAVENOUS at 12:20

## 2018-10-09 RX ADMIN — SODIUM CHLORIDE 1000 MILLILITER(S): 9 INJECTION, SOLUTION INTRAVENOUS at 01:38

## 2018-10-09 RX ADMIN — ENOXAPARIN SODIUM 40 MILLIGRAM(S): 100 INJECTION SUBCUTANEOUS at 17:24

## 2018-10-09 RX ADMIN — HYDROMORPHONE HYDROCHLORIDE 0.5 MILLIGRAM(S): 2 INJECTION INTRAMUSCULAR; INTRAVENOUS; SUBCUTANEOUS at 20:45

## 2018-10-09 RX ADMIN — SODIUM CHLORIDE 9999 MILLILITER(S): 9 INJECTION, SOLUTION INTRAVENOUS at 09:49

## 2018-10-09 RX ADMIN — PANTOPRAZOLE SODIUM 40 MILLIGRAM(S): 20 TABLET, DELAYED RELEASE ORAL at 17:24

## 2018-10-09 RX ADMIN — CHLORHEXIDINE GLUCONATE 1 APPLICATION(S): 213 SOLUTION TOPICAL at 10:14

## 2018-10-09 RX ADMIN — PIPERACILLIN AND TAZOBACTAM 25 GRAM(S): 4; .5 INJECTION, POWDER, LYOPHILIZED, FOR SOLUTION INTRAVENOUS at 22:24

## 2018-10-09 RX ADMIN — SODIUM CHLORIDE 250 MILLILITER(S): 9 INJECTION, SOLUTION INTRAVENOUS at 02:05

## 2018-10-09 RX ADMIN — FENTANYL CITRATE 19.23 MICROGRAM(S)/KG/HR: 50 INJECTION INTRAVENOUS at 02:05

## 2018-10-09 NOTE — PROCEDURE NOTE - NSPOSTPRCRAD_GEN_A_CORE
no pneumothorax/post-procedure radiography performed/central line located in the superior vena cava/central line located in the

## 2018-10-09 NOTE — PROGRESS NOTE ADULT - SUBJECTIVE AND OBJECTIVE BOX
INTERVAL HISTORY:  	  MEDICATIONS:  norepinephrine Infusion 0.05 MICROgram(s)/kG/Min IV Continuous <Continuous>    piperacillin/tazobactam IVPB. 3.375 Gram(s) IV Intermittent every 8 hours      fentaNYL   Infusion 1.5 MICROgram(s)/kG/Hr IV Continuous <Continuous>    pantoprazole  Injectable 40 milliGRAM(s) IV Push two times a day      chlorhexidine 0.12% Liquid 15 milliLiter(s) Swish and Spit two times a day  chlorhexidine 2% Cloths 1 Application(s) Topical daily  enoxaparin Injectable 40 milliGRAM(s) SubCutaneous two times a day  multiple electrolytes Injection Type 1 1000 milliLiter(s) IV Continuous <Continuous>  multiple electrolytes Injection Type 1 Bolus 1000 milliLiter(s) IV Bolus once        PHYSICAL EXAM:  T(C): 36.6 (10-09-18 @ 08:00), Max: 37.5 (10-08-18 @ 10:09)  HR: 79 (10-09-18 @ 08:00) (68 - 125)  BP: 84/47 (10-09-18 @ 08:00) (81/54 - 122/81)  RR: 16 (10-09-18 @ 08:00) (0 - 28)  SpO2: 96% (10-09-18 @ 08:00) (90% - 100%)  Wt(kg): --  I&O's Summary    08 Oct 2018 07:01  -  09 Oct 2018 07:00  --------------------------------------------------------  IN: 3979.8 mL / OUT: 1365 mL / NET: 2614.8 mL    09 Oct 2018 07:01  -  09 Oct 2018 09:16  --------------------------------------------------------  IN: 555.6 mL / OUT: 45 mL / NET: 510.6 mL      Height (cm): 175.26 (10-08 @ 23:21)  Weight (kg): 147.9 (10-08 @ 23:21)  BMI (kg/m2): 48.2 (10-08 @ 23:21)  BSA (m2): 2.54 (10-08 @ 23:21)    Appearance: Normal	  Cardiovascular: Normal S1 S2, No JVD, No murmurs  Respiratory: Diminished BS at bases	  Skin: No rashes, No ecchymoses, No cyanosis  Extremities: No clubbing, cyanosis or edema  Vascular: Peripheral pulses palpable 2+ bilaterally    TELEMETRY: SR	      LABS:	 	                          11.2   12.6  )-----------( 311      ( 09 Oct 2018 05:13 )             36.2     10-09    133<L>  |  97<L>  |  18.0  ----------------------------<  144<H>  5.2   |  18.0<L>  |  2.05<H>    Ca    8.4<L>      09 Oct 2018 05:13  Phos  4.9     10-09  Mg     1.6     10-09    TPro  5.3<L>  /  Alb  2.5<L>  /  TBili  1.5  /  DBili  x   /  AST  461<H>  /  ALT  196<H>  /  AlkPhos  59  10-09      ASSESSMENT/PLAN: TAI RUBI is a 58y man with significant history of morbid obesity and smoking with perforated viscus 2/2 duodenal ulcer, found to be in rapid atrial fibrillation now in SR.  Respiratory failure. Likely septic shock, lactic acidosis. Bedside Echocardiogram reveals preserved LV systolic fx.  No major VHD.  - Rhythm/hemodynamic: rapid atrial fibrillation.  Rate improved on Cardizem drip.  Titrate to maintain resting HR <110/min.  Will start metoprolol 50 mg PO bid for now and titrate as tolerated.  - Keep K > 4, Mg > 2.   - Check TSH  - Check lipids; pending chemistry results will likely start statin  - Morbid obesity; suspect untreated MELISA.

## 2018-10-09 NOTE — PROGRESS NOTE ADULT - ASSESSMENT
58 obese male w/ hx of smoking with perforated viscus 2/2 duodenal ulcer s/p ex lap tara patch repair and open cholecystectomy, found to be in rapid atrial fibrillation. Respiratory failure. septic shock, lactic acidosis. Bedside Echocardiogram reveals preserved LV systolic fx.     Neuro: following commands, pain controlled   CV: Continue hemodynamic monitoring, card recs appreciated, will start statin and metoprolol PO when tolerating PO  Pulm: following commands, will likely meet criteria for extubation   GI/Nutrition: NPO   /Renal: monitor UOP. Monitor BMP.  trend lactate which is downtrending   HEME- DVT prophylaxis, SCDs  ID: fu if h pyolori sent and likely treat emperic for h pylori   Lines/Tubes: ETT, OGT, LON, armstrong RIJ cordis, RRAL   Endo: ISS       Dispo: con sicu care

## 2018-10-09 NOTE — PROGRESS NOTE ADULT - SUBJECTIVE AND OBJECTIVE BOX
INTERVAL HPI/OVERNIGHT EVENTS:          MEDICATIONS  (STANDING):  chlorhexidine 0.12% Liquid 15 milliLiter(s) Swish and Spit two times a day  chlorhexidine 2% Cloths 1 Application(s) Topical daily  enoxaparin Injectable 40 milliGRAM(s) SubCutaneous two times a day  fentaNYL   Infusion 1.5 MICROgram(s)/kG/Hr (22.185 mL/Hr) IV Continuous <Continuous>  multiple electrolytes Injection Type 1 1000 milliLiter(s) (250 mL/Hr) IV Continuous <Continuous>  norepinephrine Infusion 0.05 MICROgram(s)/kG/Min (6.933 mL/Hr) IV Continuous <Continuous>  pantoprazole  Injectable 40 milliGRAM(s) IV Push two times a day  piperacillin/tazobactam IVPB. 3.375 Gram(s) IV Intermittent every 8 hours    MEDICATIONS  (PRN):      Drug Dosing Weight  Height (cm): 175.26 (08 Oct 2018 23:21)  Weight (kg): 147.9 (08 Oct 2018 23:21)  BMI (kg/m2): 48.2 (08 Oct 2018 23:21)  BSA (m2): 2.54 (08 Oct 2018 23:21)          PAST MEDICAL & SURGICAL HISTORY:  No pertinent past medical history  No significant past surgical history      ICU Vital Signs Last 24 Hrs  T(C): 36.6 (09 Oct 2018 08:00), Max: 36.6 (09 Oct 2018 08:00)  T(F): 97.9 (09 Oct 2018 08:00), Max: 97.9 (09 Oct 2018 08:00)  HR: 109 (09 Oct 2018 11:00) (68 - 111)  BP: 100/58 (09 Oct 2018 10:00) (81/54 - 122/81)  BP(mean): 70 (09 Oct 2018 10:00) (59 - 80)  ABP: 103/61 (09 Oct 2018 11:00) (86/54 - 137/80)  ABP(mean): 75 (09 Oct 2018 11:00) (65 - 98)  RR: 20 (09 Oct 2018 11:00) (0 - 30)  SpO2: 95% (09 Oct 2018 11:00) (90% - 100%)      ABG - ( 09 Oct 2018 04:25 )  pH, Arterial: 7.40  pH, Blood: x     /  pCO2: 34    /  pO2: 131   / HCO3: 22    / Base Excess: -3.0  /  SaO2: 100                 I&O's Detail    08 Oct 2018 07:01  -  09 Oct 2018 07:00  --------------------------------------------------------  IN:    diltiazem Infusion: 20 mL    fentaNYL  Infusion: 71 mL    fentaNYL  Infusion: 88.8 mL    multiple electrolytes Injection Type 1: 1750 mL    norepinephrine Infusion: 224.3 mL    Plasma: 638 mL    propofol Infusion: 87.7 mL    Solution: 50 mL    Solution: 1000 mL    Solution: 50 mL  Total IN: 3979.8 mL    OUT:    Drain: 190 mL    Indwelling Catheter - Urethral: 1175 mL  Total OUT: 1365 mL    Total NET: 2614.8 mL      09 Oct 2018 07:01  -  09 Oct 2018 12:09  --------------------------------------------------------  IN:    fentaNYL  Infusion: 111 mL    Lactated Ringers IV Bolus: 1000 mL    multiple electrolytes Injection Type 1: 1250 mL    norepinephrine Infusion: 25.2 mL  Total IN: 2386.2 mL    OUT:    Indwelling Catheter - Urethral: 116 mL  Total OUT: 116 mL    Total NET: 2270.2 mL          Mode: CPAP with PS  FiO2: 40  PEEP: 8  PS: 10  MAP: 11  PIP: 19      Physical Exam:    Neurological:  No sensory/motor deficits following commands   Respiratory: Breath Sounds equal & clear decreased at bases   Cardiovascular: ireg ireg   Gastrointestinal: Soft, non-tender, slighlty distended, midline dressing CDI, LON w/ serosang   Extremities: + peripheral edema    Musculoskeletal: No joint pain, swelling or deformity; no limitation of movement      LABS:  CBC Full  -  ( 09 Oct 2018 05:13 )  WBC Count : 12.6 K/uL  Hemoglobin : 11.2 g/dL  Hematocrit : 36.2 %  Platelet Count - Automated : 311 K/uL  Mean Cell Volume : 88.1 fl  Mean Cell Hemoglobin : 27.3 pg  Mean Cell Hemoglobin Concentration : 30.9 g/dL  Auto Neutrophil # : x  Auto Lymphocyte # : x  Auto Monocyte # : x  Auto Eosinophil # : x  Auto Basophil # : x  Auto Neutrophil % : x  Auto Lymphocyte % : x  Auto Monocyte % : x  Auto Eosinophil % : x  Auto Basophil % : x    10-09    133<L>  |  97<L>  |  18.0  ----------------------------<  144<H>  5.2   |  18.0<L>  |  2.05<H>    Ca    8.4<L>      09 Oct 2018 05:13  Phos  5.0     10-09  Mg     2.3     10-09    TPro  5.3<L>  /  Alb  2.5<L>  /  TBili  1.5  /  DBili  x   /  AST  461<H>  /  ALT  196<H>  /  AlkPhos  59  10-09    PT/INR - ( 09 Oct 2018 05:13 )   PT: 21.0 sec;   INR: 1.88 ratio         PTT - ( 09 Oct 2018 05:13 )  PTT:36.9 sec

## 2018-10-09 NOTE — PROCEDURE NOTE - NSPROCDETAILS_GEN_ALL_CORE
ultrasound guidance/positive blood return obtained via catheter/all materials/supplies accounted for at end of procedure/location identified, draped/prepped, sterile technique used, needle inserted/introduced/connected to a pressurized flush line/sutured in place/Seldinger technique
guidewire recovered/lumen(s) aspirated and flushed/sterile dressing applied
lumen(s) aspirated and flushed/ultrasound guidance/sterile dressing applied/sterile technique, catheter placed/guidewire recovered

## 2018-10-10 LAB
ALBUMIN SERPL ELPH-MCNC: 3.2 G/DL — LOW (ref 3.3–5.2)
ALP SERPL-CCNC: 48 U/L — SIGNIFICANT CHANGE UP (ref 40–120)
ALT FLD-CCNC: 132 U/L — HIGH
ANION GAP SERPL CALC-SCNC: 15 MMOL/L — SIGNIFICANT CHANGE UP (ref 5–17)
ANION GAP SERPL CALC-SCNC: 16 MMOL/L — SIGNIFICANT CHANGE UP (ref 5–17)
APTT BLD: 67 SEC — HIGH (ref 27.5–37.4)
AST SERPL-CCNC: 125 U/L — HIGH
BASE EXCESS BLDV CALC-SCNC: -0.3 MMOL/L — SIGNIFICANT CHANGE UP (ref -2–2)
BASE EXCESS BLDV CALC-SCNC: -0.8 MMOL/L — SIGNIFICANT CHANGE UP (ref -2–2)
BILIRUB SERPL-MCNC: 0.9 MG/DL — SIGNIFICANT CHANGE UP (ref 0.4–2)
BLOOD GAS COMMENTS, VENOUS: SIGNIFICANT CHANGE UP
BLOOD GAS COMMENTS, VENOUS: SIGNIFICANT CHANGE UP
BUN SERPL-MCNC: 25 MG/DL — HIGH (ref 8–20)
BUN SERPL-MCNC: 27 MG/DL — HIGH (ref 8–20)
CALCIUM SERPL-MCNC: 8.3 MG/DL — LOW (ref 8.6–10.2)
CALCIUM SERPL-MCNC: 8.5 MG/DL — LOW (ref 8.6–10.2)
CHLORIDE SERPL-SCNC: 95 MMOL/L — LOW (ref 98–107)
CHLORIDE SERPL-SCNC: 97 MMOL/L — LOW (ref 98–107)
CO2 SERPL-SCNC: 22 MMOL/L — SIGNIFICANT CHANGE UP (ref 22–29)
CO2 SERPL-SCNC: 23 MMOL/L — SIGNIFICANT CHANGE UP (ref 22–29)
CREAT SERPL-MCNC: 2.5 MG/DL — HIGH (ref 0.5–1.3)
CREAT SERPL-MCNC: 2.53 MG/DL — HIGH (ref 0.5–1.3)
EOSINOPHIL # BLD AUTO: 0 K/UL — SIGNIFICANT CHANGE UP (ref 0–0.5)
EOSINOPHIL NFR BLD AUTO: 0 % — SIGNIFICANT CHANGE UP (ref 0–5)
GAS PNL BLDA: SIGNIFICANT CHANGE UP
GAS PNL BLDV: SIGNIFICANT CHANGE UP
GAS PNL BLDV: SIGNIFICANT CHANGE UP
GLUCOSE SERPL-MCNC: 114 MG/DL — SIGNIFICANT CHANGE UP (ref 70–115)
GLUCOSE SERPL-MCNC: 121 MG/DL — HIGH (ref 70–115)
H PYLORI AB SER-ACNC: 56.6 UNITS — HIGH
HCO3 BLDV-SCNC: 23 MMOL/L — SIGNIFICANT CHANGE UP (ref 21–29)
HCO3 BLDV-SCNC: 23 MMOL/L — SIGNIFICANT CHANGE UP (ref 21–29)
HCT VFR BLD CALC: 33.3 % — LOW (ref 42–52)
HCT VFR BLD CALC: 34.6 % — LOW (ref 42–52)
HGB BLD-MCNC: 10 G/DL — LOW (ref 14–18)
HGB BLD-MCNC: 9.7 G/DL — LOW (ref 14–18)
HOROWITZ INDEX BLDV+IHG-RTO: SIGNIFICANT CHANGE UP
HOROWITZ INDEX BLDV+IHG-RTO: SIGNIFICANT CHANGE UP
LACTATE SERPL-SCNC: 2.9 MMOL/L — HIGH (ref 0.5–2)
LYMPHOCYTES # BLD AUTO: 0.4 K/UL — LOW (ref 1–4.8)
LYMPHOCYTES # BLD AUTO: 4.3 % — LOW (ref 20–55)
MAGNESIUM SERPL-MCNC: 2.6 MG/DL — SIGNIFICANT CHANGE UP (ref 1.6–2.6)
MCHC RBC-ENTMCNC: 26.3 PG — LOW (ref 27–31)
MCHC RBC-ENTMCNC: 26.4 PG — LOW (ref 27–31)
MCHC RBC-ENTMCNC: 28.9 G/DL — LOW (ref 32–36)
MCHC RBC-ENTMCNC: 29.1 G/DL — LOW (ref 32–36)
MCV RBC AUTO: 90.5 FL — SIGNIFICANT CHANGE UP (ref 80–94)
MCV RBC AUTO: 91.1 FL — SIGNIFICANT CHANGE UP (ref 80–94)
MONOCYTES # BLD AUTO: 0.9 K/UL — HIGH (ref 0–0.8)
MONOCYTES NFR BLD AUTO: 8.8 % — SIGNIFICANT CHANGE UP (ref 3–10)
NEUTROPHILS # BLD AUTO: 8.9 K/UL — HIGH (ref 1.8–8)
NEUTROPHILS NFR BLD AUTO: 86.6 % — HIGH (ref 37–73)
NT-PROBNP SERPL-SCNC: 6008 PG/ML — HIGH (ref 0–300)
PCO2 BLDV: 58 MMHG — HIGH (ref 35–50)
PCO2 BLDV: 62 MMHG — HIGH (ref 35–50)
PH BLDV: 7.26 — LOW (ref 7.32–7.43)
PH BLDV: 7.27 — LOW (ref 7.32–7.43)
PHOSPHATE SERPL-MCNC: 6.1 MG/DL — HIGH (ref 2.4–4.7)
PLATELET # BLD AUTO: 267 K/UL — SIGNIFICANT CHANGE UP (ref 150–400)
PLATELET # BLD AUTO: 270 K/UL — SIGNIFICANT CHANGE UP (ref 150–400)
PO2 BLDV: 35 MMHG — SIGNIFICANT CHANGE UP (ref 25–45)
PO2 BLDV: 37 MMHG — SIGNIFICANT CHANGE UP (ref 25–45)
POTASSIUM SERPL-MCNC: 5.3 MMOL/L — SIGNIFICANT CHANGE UP (ref 3.5–5.3)
POTASSIUM SERPL-MCNC: 5.4 MMOL/L — HIGH (ref 3.5–5.3)
POTASSIUM SERPL-SCNC: 5.3 MMOL/L — SIGNIFICANT CHANGE UP (ref 3.5–5.3)
POTASSIUM SERPL-SCNC: 5.4 MMOL/L — HIGH (ref 3.5–5.3)
PROT SERPL-MCNC: 5.7 G/DL — LOW (ref 6.6–8.7)
RBC # BLD: 3.68 M/UL — LOW (ref 4.6–6.2)
RBC # BLD: 3.8 M/UL — LOW (ref 4.6–6.2)
RBC # FLD: 16.6 % — HIGH (ref 11–15.6)
RBC # FLD: 16.7 % — HIGH (ref 11–15.6)
SAO2 % BLDV: 54 % — SIGNIFICANT CHANGE UP
SAO2 % BLDV: 59 % — SIGNIFICANT CHANGE UP
SODIUM SERPL-SCNC: 133 MMOL/L — LOW (ref 135–145)
SODIUM SERPL-SCNC: 135 MMOL/L — SIGNIFICANT CHANGE UP (ref 135–145)
WBC # BLD: 10.3 K/UL — SIGNIFICANT CHANGE UP (ref 4.8–10.8)
WBC # BLD: 10.5 K/UL — SIGNIFICANT CHANGE UP (ref 4.8–10.8)
WBC # FLD AUTO: 10.3 K/UL — SIGNIFICANT CHANGE UP (ref 4.8–10.8)
WBC # FLD AUTO: 10.5 K/UL — SIGNIFICANT CHANGE UP (ref 4.8–10.8)

## 2018-10-10 RX ORDER — BENZOCAINE AND MENTHOL 5; 1 G/100ML; G/100ML
1 LIQUID ORAL ONCE
Qty: 0 | Refills: 0 | Status: COMPLETED | OUTPATIENT
Start: 2018-10-10 | End: 2018-10-11

## 2018-10-10 RX ORDER — SODIUM CHLORIDE 9 MG/ML
250 INJECTION, SOLUTION INTRAVENOUS ONCE
Qty: 0 | Refills: 0 | Status: COMPLETED | OUTPATIENT
Start: 2018-10-10 | End: 2018-10-10

## 2018-10-10 RX ORDER — HEPARIN SODIUM 5000 [USP'U]/ML
2000 INJECTION INTRAVENOUS; SUBCUTANEOUS
Qty: 25000 | Refills: 0 | Status: DISCONTINUED | OUTPATIENT
Start: 2018-10-10 | End: 2018-10-12

## 2018-10-10 RX ORDER — DIGOXIN 250 MCG
0.12 TABLET ORAL EVERY 24 HOURS
Qty: 0 | Refills: 0 | Status: DISCONTINUED | OUTPATIENT
Start: 2018-10-10 | End: 2018-10-11

## 2018-10-10 RX ORDER — SODIUM CHLORIDE 9 MG/ML
1000 INJECTION INTRAMUSCULAR; INTRAVENOUS; SUBCUTANEOUS ONCE
Qty: 0 | Refills: 0 | Status: COMPLETED | OUTPATIENT
Start: 2018-10-10 | End: 2018-10-10

## 2018-10-10 RX ORDER — SODIUM CHLORIDE 9 MG/ML
1000 INJECTION, SOLUTION INTRAVENOUS
Qty: 0 | Refills: 0 | Status: DISCONTINUED | OUTPATIENT
Start: 2018-10-10 | End: 2018-10-16

## 2018-10-10 RX ORDER — CALCIUM GLUCONATE 100 MG/ML
1 VIAL (ML) INTRAVENOUS ONCE
Qty: 0 | Refills: 0 | Status: COMPLETED | OUTPATIENT
Start: 2018-10-10 | End: 2018-10-10

## 2018-10-10 RX ORDER — PIPERACILLIN AND TAZOBACTAM 4; .5 G/20ML; G/20ML
3.38 INJECTION, POWDER, LYOPHILIZED, FOR SOLUTION INTRAVENOUS EVERY 8 HOURS
Qty: 0 | Refills: 0 | Status: DISCONTINUED | OUTPATIENT
Start: 2018-10-10 | End: 2018-10-11

## 2018-10-10 RX ADMIN — Medication 0.12 MILLIGRAM(S): at 05:34

## 2018-10-10 RX ADMIN — PANTOPRAZOLE SODIUM 40 MILLIGRAM(S): 20 TABLET, DELAYED RELEASE ORAL at 05:25

## 2018-10-10 RX ADMIN — HYDROMORPHONE HYDROCHLORIDE 0.5 MILLIGRAM(S): 2 INJECTION INTRAMUSCULAR; INTRAVENOUS; SUBCUTANEOUS at 05:26

## 2018-10-10 RX ADMIN — Medication 50 MILLILITER(S): at 01:41

## 2018-10-10 RX ADMIN — Medication 200 GRAM(S): at 06:37

## 2018-10-10 RX ADMIN — HYDROMORPHONE HYDROCHLORIDE 1 MILLIGRAM(S): 2 INJECTION INTRAMUSCULAR; INTRAVENOUS; SUBCUTANEOUS at 22:04

## 2018-10-10 RX ADMIN — SODIUM CHLORIDE 125 MILLILITER(S): 9 INJECTION, SOLUTION INTRAVENOUS at 01:08

## 2018-10-10 RX ADMIN — PANTOPRAZOLE SODIUM 40 MILLIGRAM(S): 20 TABLET, DELAYED RELEASE ORAL at 19:00

## 2018-10-10 RX ADMIN — PIPERACILLIN AND TAZOBACTAM 25 GRAM(S): 4; .5 INJECTION, POWDER, LYOPHILIZED, FOR SOLUTION INTRAVENOUS at 21:50

## 2018-10-10 RX ADMIN — SODIUM CHLORIDE 1000 MILLILITER(S): 9 INJECTION INTRAMUSCULAR; INTRAVENOUS; SUBCUTANEOUS at 03:55

## 2018-10-10 RX ADMIN — CHLORHEXIDINE GLUCONATE 1 APPLICATION(S): 213 SOLUTION TOPICAL at 10:16

## 2018-10-10 RX ADMIN — SODIUM CHLORIDE 100 MILLILITER(S): 9 INJECTION, SOLUTION INTRAVENOUS at 21:51

## 2018-10-10 RX ADMIN — PIPERACILLIN AND TAZOBACTAM 25 GRAM(S): 4; .5 INJECTION, POWDER, LYOPHILIZED, FOR SOLUTION INTRAVENOUS at 13:51

## 2018-10-10 RX ADMIN — SODIUM CHLORIDE 500 MILLILITER(S): 9 INJECTION, SOLUTION INTRAVENOUS at 09:45

## 2018-10-10 RX ADMIN — HEPARIN SODIUM 20 UNIT(S)/HR: 5000 INJECTION INTRAVENOUS; SUBCUTANEOUS at 10:49

## 2018-10-10 RX ADMIN — HYDROMORPHONE HYDROCHLORIDE 0.5 MILLIGRAM(S): 2 INJECTION INTRAMUSCULAR; INTRAVENOUS; SUBCUTANEOUS at 06:00

## 2018-10-10 RX ADMIN — HEPARIN SODIUM 7500 UNIT(S): 5000 INJECTION INTRAVENOUS; SUBCUTANEOUS at 05:25

## 2018-10-10 RX ADMIN — PIPERACILLIN AND TAZOBACTAM 25 GRAM(S): 4; .5 INJECTION, POWDER, LYOPHILIZED, FOR SOLUTION INTRAVENOUS at 05:32

## 2018-10-10 RX ADMIN — HYDROMORPHONE HYDROCHLORIDE 1 MILLIGRAM(S): 2 INJECTION INTRAMUSCULAR; INTRAVENOUS; SUBCUTANEOUS at 21:50

## 2018-10-10 NOTE — PHYSICAL THERAPY INITIAL EVALUATION ADULT - ADDITIONAL COMMENTS
Pt lives alone in an apartment with [address] building and a flight of stairs to 2nd floor apartment. Both have railings. Girlfriend does not work and is able to assist as needed.

## 2018-10-10 NOTE — DIETITIAN INITIAL EVALUATION ADULT. - PERTINENT LABORATORY DATA
10-10 Na135 mmol/L Glu 114 mg/dL K+ 5.4 mmol/L<H> Cr  2.50 mg/dL<H> BUN 27.0 mg/dL<H> Phos 6.1 mg/dL<H> Alb 3.2 g/dL<L> PAB n/a

## 2018-10-10 NOTE — PHYSICAL THERAPY INITIAL EVALUATION ADULT - GENERAL OBSERVATIONS, REHAB EVAL
Pt received OOB in chair, + telemetry/SpO2 monitor, NC O2, central line, a-line, armstrong. C/o 4/10 abdominal pain.

## 2018-10-10 NOTE — DIETITIAN INITIAL EVALUATION ADULT. - ETIOLOGY
related to energy intake exceeding expenditure related to perforated duodenal ulcer related to excessive PO intake after quitting drinking/smoking 6 months ago related to perforated duodenal ulcer, s/p ex-lap, Skyler patch repair, open cholecystectomy

## 2018-10-10 NOTE — CHART NOTE - NSCHARTNOTEFT_GEN_A_CORE
I spoke with Dr Parson of Cardiology and he does not feel chemical or DC cardioversion is worth the risk of RUBEN especially high likelihood of reverting back to A-fib.

## 2018-10-10 NOTE — PROGRESS NOTE ADULT - ASSESSMENT
A/P: 58 obese male w/ hx of smoking with perforated viscus 2/2 duodenal ulcer s/p ex lap tara patch repair and open cholecystectomy, found to be in rapid atrial fibrillation. Respiratory failure. septic shock, lactic acidosis. Bedside Echocardiogram reveals preserved LV systolic fx    Neuro: Pain controllled, cont current regimen  CV: Continue hemodynamic monitoring, workup consistent with hypovolemia.  Improvement VS, ScvO2, w/ increase in preload, and digoxin.  Cont to reassess and monitor volume status.  Cards recs appreciated.   Pulm: Hypercapnia likely at baseline, cont pullm toleiting, OOB to chair, chest PT  GI/Nutrition: NPO   /Renal: monitor UOP and Cr.  Cont to assess response to IVF  HEME- SQH for renal insufficiency   ID: Consider empiric tx for h pylori, f/u results, Cont Zosyn  Lines/Tubes: ETT, OGT, LON, armstrong RIJ cordis, RRAL   Endo: ISS   Skin: OOB to chair, frequent turning and skin care      Dispo: SICU

## 2018-10-10 NOTE — PHYSICAL THERAPY INITIAL EVALUATION ADULT - PERTINENT HX OF CURRENT PROBLEM, REHAB EVAL
Pt is a 57 y/o male who presented from home with perforated duodenal ulcer with new onset a-fib, respiratory failure, shock and lactic acidosis.

## 2018-10-10 NOTE — DIETITIAN INITIAL EVALUATION ADULT. - SIGNS/SYMPTOMS
as evidenced by pt stating eating more after quit drinking/smoking 6mo ago, BMI 47.8 as evidenced by abdominal pain and SOB as evidenced by BMI 47.8

## 2018-10-10 NOTE — PROGRESS NOTE ADULT - ASSESSMENT
58 obese male w/ hx of smoking with perforated viscus 2/2 duodenal ulcer s/p ex lap tara patch repair and open cholecystectomy, found to be in rapid atrial fibrillation. Respiratory failure. septic shock, lactic acidosis now extubated w/ TALA      Neuro: Pain controlled cont current regimen  CV: Continue hemodynamic monitoring, on hep ggt and dig, cards recs appreciated   Pulm:  cont pulm toileting OOB to chair, chest PT  GI/Nutrition: NPO for now    /Renal: monitor UOP and Cr.  Cont to assess response to IVF  HEME- HH stable on therapeutic AC   ID: Consider empiric tx for h pylori, f/u results, Cont Zosyn  Lines/Tubes:  nathaniel FORREST cordis, RRAL   Endo: ISS   Skin: OOB to chair, frequent turning and skin care      Dispo: SICU

## 2018-10-10 NOTE — PROGRESS NOTE ADULT - SUBJECTIVE AND OBJECTIVE BOX
INTERVAL HPI/OVERNIGHT EVENTS:  no events overnight, sitting in chair this morning, feels well and is passing flatus   on hep ggt AC for Afib and digoxin PO     MEDICATIONS  (STANDING):  chlorhexidine 2% Cloths 1 Application(s) Topical daily  digoxin  Injectable 0.125 milliGRAM(s) IV Push every 24 hours  heparin  Infusion 2000 Unit(s)/Hr (20 mL/Hr) IV Continuous <Continuous>  multiple electrolytes Injection Type 1 1000 milliLiter(s) (100 mL/Hr) IV Continuous <Continuous>  pantoprazole  Injectable 40 milliGRAM(s) IV Push two times a day  piperacillin/tazobactam IVPB. 3.375 Gram(s) IV Intermittent every 8 hours    MEDICATIONS  (PRN):  HYDROmorphone  Injectable 0.5 milliGRAM(s) IV Push every 3 hours PRN Moderate Pain (4 - 6)  HYDROmorphone  Injectable 1 milliGRAM(s) IV Push every 3 hours PRN Severe Pain (7 - 10)      Drug Dosing Weight  Height (cm): 175.26 (08 Oct 2018 23:21)  Weight (kg): 147.9 (08 Oct 2018 23:21)  BMI (kg/m2): 48.2 (08 Oct 2018 23:21)  BSA (m2): 2.54 (08 Oct 2018 23:21)      PAST MEDICAL & SURGICAL HISTORY:  No pertinent past medical history  No significant past surgical history      ICU Vital Signs Last 24 Hrs  T(C): 37 (10 Oct 2018 12:00), Max: 37 (10 Oct 2018 12:00)  T(F): 98.6 (10 Oct 2018 12:00), Max: 98.6 (10 Oct 2018 12:00)  HR: 112 (10 Oct 2018 12:00) (101 - 129)  BP: 94/61 (09 Oct 2018 14:00) (94/61 - 102/55)  BP(mean): 72 (09 Oct 2018 14:00) (70 - 72)  ABP: 109/62 (10 Oct 2018 12:00) (83/83 - 146/87)  ABP(mean): 77 (10 Oct 2018 12:00) (63 - 112)  RR: 26 (10 Oct 2018 12:00) (10 - 37)  SpO2: 100% (10 Oct 2018 12:00) (91% - 100%)      ABG - ( 10 Oct 2018 05:00 )  pH, Arterial: 7.28  pH, Blood: x     /  pCO2: 54    /  pO2: 110   / HCO3: 23    / Base Excess: -1.6  /  SaO2: 98              I&O's Detail    09 Oct 2018 07:01  -  10 Oct 2018 07:00  --------------------------------------------------------  IN:    fentaNYL  Infusion: 133.2 mL    Lactated Ringers IV Bolus: 1000 mL    multiple electrolytes Injection Type 1: 1225 mL    multiple electrolytes Injection Type 1multiple electrolytes Injection Type 1: 3500 mL    norepinephrine Infusion: 28 mL    Solution: 200 mL    Solution: 250 mL    Solution: 100 mL  Total IN: 6436.2 mL    OUT:    Drain: 155 mL    Indwelling Catheter - Urethral: 735 mL  Total OUT: 890 mL    Total NET: 5546.2 mL      10 Oct 2018 07:  -  10 Oct 2018 12:48  --------------------------------------------------------  IN:    heparin Infusion: 40 mL    Lactated Ringers IV Bolus: 250 mL    multiple electrolytes Injection Type 1: 375 mL  Total IN: 665 mL    OUT:    Indwelling Catheter - Urethral: 218 mL  Total OUT: 218 mL    Total NET: 447 mL        Physical Exam:    Neurological:  No sensory/motor deficits  HEENT: PERRLA, EOMI, no drainage or redness  Neck: R cordis in place to be removed   Respiratory: Breath Sounds equal & clear to auscultation  cardiovascular:  normal S1, S2; no murmurs, gallops or rubs  Gastrointestinal: Soft, approp tender, incision CDI w/ LON w/ serosang draiange   Extremities: warm and edematous          LABS:  CBC Full  -  ( 10 Oct 2018 05:53 )  WBC Count : 10.3 K/uL  Hemoglobin : 10.0 g/dL  Hematocrit : 34.6 %  Platelet Count - Automated : 267 K/uL  Mean Cell Volume : 91.1 fl  Mean Cell Hemoglobin : 26.3 pg  Mean Cell Hemoglobin Concentration : 28.9 g/dL  Auto Neutrophil # : 8.9 K/uL  Auto Lymphocyte # : 0.4 K/uL  Auto Monocyte # : 0.9 K/uL  Auto Eosinophil # : 0.0 K/uL  Auto Basophil # : x  Auto Neutrophil % : 86.6 %  Auto Lymphocyte % : 4.3 %  Auto Monocyte % : 8.8 %  Auto Eosinophil % : 0.0 %  Auto Basophil % : x    10-10    135  |  97<L>  |  27.0<H>  ----------------------------<  114  5.4<H>   |  22.0  |  2.50<H>    Ca    8.3<L>      10 Oct 2018 05:53  Phos  6.1     10-10  Mg     2.6     10-10    TPro  5.7<L>  /  Alb  3.2<L>  /  TBili  0.9  /  DBili  x   /  AST  125<H>  /  ALT  132<H>  /  AlkPhos  48  10-10    PT/INR - ( 09 Oct 2018 05:13 )   PT: 21.0 sec;   INR: 1.88 ratio         PTT - ( 09 Oct 2018 05:13 )  PTT:36.9 sec  Urinalysis Basic - ( 09 Oct 2018 21:35 )    Color: Yellow / Appearance: Slightly Turbid / S.025 / pH: x  Gluc: x / Ketone: Negative  / Bili: Negative / Urobili: 1 mg/dL   Blood: x / Protein: 100 mg/dL / Nitrite: Negative   Leuk Esterase: Trace / RBC: 11-25 /HPF / WBC 0-2   Sq Epi: x / Non Sq Epi: Occasional / Bacteria: Occasional INTERVAL HPI/OVERNIGHT EVENTS:   sitting in chair this morning, feels well and is passing flatus   on hep ggt AC for Afib and digoxin PO     MEDICATIONS  (STANDING):  chlorhexidine 2% Cloths 1 Application(s) Topical daily  digoxin  Injectable 0.125 milliGRAM(s) IV Push every 24 hours  heparin  Infusion 2000 Unit(s)/Hr (20 mL/Hr) IV Continuous <Continuous>  multiple electrolytes Injection Type 1 1000 milliLiter(s) (100 mL/Hr) IV Continuous <Continuous>  pantoprazole  Injectable 40 milliGRAM(s) IV Push two times a day  piperacillin/tazobactam IVPB. 3.375 Gram(s) IV Intermittent every 8 hours    MEDICATIONS  (PRN):  HYDROmorphone  Injectable 0.5 milliGRAM(s) IV Push every 3 hours PRN Moderate Pain (4 - 6)  HYDROmorphone  Injectable 1 milliGRAM(s) IV Push every 3 hours PRN Severe Pain (7 - 10)      Drug Dosing Weight  Height (cm): 175.26 (08 Oct 2018 23:21)  Weight (kg): 147.9 (08 Oct 2018 23:21)  BMI (kg/m2): 48.2 (08 Oct 2018 23:21)  BSA (m2): 2.54 (08 Oct 2018 23:21)      PAST MEDICAL & SURGICAL HISTORY:  No pertinent past medical history  No significant past surgical history      ICU Vital Signs Last 24 Hrs  T(C): 37 (10 Oct 2018 12:00), Max: 37 (10 Oct 2018 12:00)  T(F): 98.6 (10 Oct 2018 12:00), Max: 98.6 (10 Oct 2018 12:00)  HR: 112 (10 Oct 2018 12:00) (101 - 129)  BP: 94/61 (09 Oct 2018 14:00) (94/61 - 102/55)  BP(mean): 72 (09 Oct 2018 14:00) (70 - 72)  ABP: 109/62 (10 Oct 2018 12:00) (83/83 - 146/87)  ABP(mean): 77 (10 Oct 2018 12:00) (63 - 112)  RR: 26 (10 Oct 2018 12:00) (10 - 37)  SpO2: 100% (10 Oct 2018 12:00) (91% - 100%)      ABG - ( 10 Oct 2018 05:00 )  pH, Arterial: 7.28  pH, Blood: x     /  pCO2: 54    /  pO2: 110   / HCO3: 23    / Base Excess: -1.6  /  SaO2: 98              I&O's Detail    09 Oct 2018 07:  -  10 Oct 2018 07:00  --------------------------------------------------------  IN:    fentaNYL  Infusion: 133.2 mL    Lactated Ringers IV Bolus: 1000 mL    multiple electrolytes Injection Type 1: 1225 mL    multiple electrolytes Injection Type 1multiple electrolytes Injection Type 1: 3500 mL    norepinephrine Infusion: 28 mL    Solution: 200 mL    Solution: 250 mL    Solution: 100 mL  Total IN: 6436.2 mL    OUT:    Drain: 155 mL    Indwelling Catheter - Urethral: 735 mL  Total OUT: 890 mL    Total NET: 5546.2 mL      10 Oct 2018 07:  -  10 Oct 2018 12:48  --------------------------------------------------------  IN:    heparin Infusion: 40 mL    Lactated Ringers IV Bolus: 250 mL    multiple electrolytes Injection Type 1: 375 mL  Total IN: 665 mL    OUT:    Indwelling Catheter - Urethral: 218 mL  Total OUT: 218 mL    Total NET: 447 mL        Physical Exam:    Neurological:  No sensory/motor deficits  HEENT: PERRLA, EOMI, no drainage or redness  Neck: R cordis in place to be removed   Respiratory: Breath Sounds equal & clear to auscultation  cardiovascular:  normal S1, S2; no murmurs, gallops or rubs  Gastrointestinal: Soft, approp tender, incision CDI w/ LON w/ serosang draiange   Extremities: warm and edematous          LABS:  CBC Full  -  ( 10 Oct 2018 05:53 )  WBC Count : 10.3 K/uL  Hemoglobin : 10.0 g/dL  Hematocrit : 34.6 %  Platelet Count - Automated : 267 K/uL  Mean Cell Volume : 91.1 fl  Mean Cell Hemoglobin : 26.3 pg  Mean Cell Hemoglobin Concentration : 28.9 g/dL  Auto Neutrophil # : 8.9 K/uL  Auto Lymphocyte # : 0.4 K/uL  Auto Monocyte # : 0.9 K/uL  Auto Eosinophil # : 0.0 K/uL  Auto Basophil # : x  Auto Neutrophil % : 86.6 %  Auto Lymphocyte % : 4.3 %  Auto Monocyte % : 8.8 %  Auto Eosinophil % : 0.0 %  Auto Basophil % : x    10-10    135  |  97<L>  |  27.0<H>  ----------------------------<  114  5.4<H>   |  22.0  |  2.50<H>    Ca    8.3<L>      10 Oct 2018 05:53  Phos  6.1     10-10  Mg     2.6     10-10    TPro  5.7<L>  /  Alb  3.2<L>  /  TBili  0.9  /  DBili  x   /  AST  125<H>  /  ALT  132<H>  /  AlkPhos  48  10-10    PT/INR - ( 09 Oct 2018 05:13 )   PT: 21.0 sec;   INR: 1.88 ratio         PTT - ( 09 Oct 2018 05:13 )  PTT:36.9 sec  Urinalysis Basic - ( 09 Oct 2018 21:35 )    Color: Yellow / Appearance: Slightly Turbid / S.025 / pH: x  Gluc: x / Ketone: Negative  / Bili: Negative / Urobili: 1 mg/dL   Blood: x / Protein: 100 mg/dL / Nitrite: Negative   Leuk Esterase: Trace / RBC: 11-25 /HPF / WBC 0-2   Sq Epi: x / Non Sq Epi: Occasional / Bacteria: Occasional

## 2018-10-10 NOTE — PROGRESS NOTE ADULT - SUBJECTIVE AND OBJECTIVE BOX
INTERVAL HISTORY:  Feels better, less abdominal pain.  Denies CP.  	  MEDICATIONS:  digoxin  Injectable 0.125 milliGRAM(s) IV Push every 24 hours  piperacillin/tazobactam IVPB. 3.375 Gram(s) IV Intermittent every 8 hours  HYDROmorphone  Injectable 0.5 milliGRAM(s) IV Push every 3 hours PRN  HYDROmorphone  Injectable 1 milliGRAM(s) IV Push every 3 hours PRN  pantoprazole  Injectable 40 milliGRAM(s) IV Push two times a day  chlorhexidine 2% Cloths 1 Application(s) Topical daily  heparin  Injectable 7500 Unit(s) SubCutaneous every 8 hours  multiple electrolytes Injection Type 1 1000 milliLiter(s) IV Continuous <Continuous>        PHYSICAL EXAM:  T(C): 36.4 (10-10-18 @ 04:11), Max: 36.9 (10-09-18 @ 12:00)  HR: 101 (10-10-18 @ 07:00) (91 - 129)  BP: 94/61 (10-09-18 @ 14:00) (94/61 - 102/55)  RR: 14 (10-10-18 @ 07:00) (10 - 37)  SpO2: 99% (10-10-18 @ 07:00) (91% - 100%)  Wt(kg): --  I&O's Summary    09 Oct 2018 07:01  -  10 Oct 2018 07:00  --------------------------------------------------------  IN: 6436.2 mL / OUT: 890 mL / NET: 5546.2 mL          Appearance: Normal	  HEENT:   Normal oral mucosa  Cardiovascular: Normal S1 S2, No JVD, No murmurs, No edema  Respiratory: Lungs clear to auscultation	  Psychiatry: A & O x 3, Mood & affect appropriate  Skin: No rashes, No ecchymoses, No cyanosis  Neurologic: Non-focal  Extremities: Normal range of motion, No clubbing, cyanosis or edema  Vascular: Peripheral pulses palpable 2+ bilaterally    TELEMETRY: AF/CVR	        LABS:	 	                       10.0   10.3  )-----------( 267      ( 10 Oct 2018 05:53 )             34.6     10-10    135  |  97<L>  |  27.0<H>  ----------------------------<  114  5.4<H>   |  22.0  |  2.50<H>    Ca    8.3<L>      10 Oct 2018 05:53  Phos  6.1     10-10  Mg     2.6     10-10    TPro  5.7<L>  /  Alb  3.2<L>  /  TBili  0.9  /  DBili  x   /  AST  125<H>  /  ALT  132<H>  /  AlkPhos  48  10-10    proBNP: Serum Pro-Brain Natriuretic Peptide: 6008 pg/mL (10-10 @ 05:53)      ASSESSMENT/PLAN: TAI RUBI is a 58y man with significant history of morbid obesity and smoking with perforated viscus 2/2 duodenal ulcer, found to be in rapid atrial fibrillation now in SR.  Respiratory failure. Likely septic shock, lactic acidosis. Bedside Echocardiogram reveals preserved LV systolic fx.  No major VHD.  - Rhythm/hemodynamic: rapid atrial fibrillation.  Rate improved on Cardizem drip.  Titrate to maintain resting HR <110/min.    - Keep K > 4, Mg > 2.   - Check lipids; pending chemistry results will likely start statin  - Morbid obesity.

## 2018-10-10 NOTE — PROGRESS NOTE ADULT - SUBJECTIVE AND OBJECTIVE BOX
HISTORY  58y Male s/p ex lap with tara patch of duodenal ulcer and open ira due to perforated duodenal ulcer    24 HOUR EVENTS: Patient remains off pressors, and was extubated from the ventilator doing well.  Pt remains comfortably c/o of minimal pain over his incision, that is relieved with pain medications.  Pt feels well off ventilator, denies any CP, SOB or dyspnea at this time.  Pt expressed to the team he feels thirsty and continues to be NPO.  Pt has had decreased UOP, and rising Cr.  Denies N/V, or dizziness. Tachycardia throughout shift, given 1x dose of Lopressor for afib, pt responded well with drop in pressure, SBP 80's.  Given 250 of albumin x2, with adequate response in HR.  Urine lytes FeNa<1.  Given an additional 1L bolus and started on digoxin.      SUBJECTIVE/ROS:  [x ] A ten-point review of systems was otherwise negative except as noted.  [ ] Due to altered mental status/intubation, subjective information were not able to be obtained from the patient. History was obtained, to the extent possible, from review of the chart and collateral sources of information.      NEURO  RASS: 0    GCS: 15    CAM ICU: neg  Exam: No focal neuro deficits, HALL 5/5.   Meds: HYDROmorphone  Injectable 0.5 milliGRAM(s) IV Push every 3 hours PRN Moderate Pain (4 - 6)  HYDROmorphone  Injectable 1 milliGRAM(s) IV Push every 3 hours PRN Severe Pain (7 - 10)    [x] Adequacy of sedation and pain control has been assessed and adjusted      RESPIRATORY  RR: 19 (10-10-18 @ 05:00) (0 - 37)  SpO2: 100% (10-10-18 @ 05:00) (91% - 100%)  Wt(kg): --  Exam: unlabored, clear to auscultation bilaterally  Mechanical Ventilation:  ABG - ( 10 Oct 2018 05:00 )  pH: 7.28  /  pCO2: 54    /  pO2: 110   / HCO3: 23    / Base Excess: -1.6  /  SaO2: 98      Lactate: x                [ ] Extubation Readiness Assessed  Meds:       CARDIOVASCULAR  HR: 108 (10-10-18 @ 05:00) (79 - 129)  BP: 94/61 (10-09-18 @ 14:00) (84/47 - 105/71)  BP(mean): 72 (10-09-18 @ 14:00) (59 - 80)  ABP: 95/60 (10-10-18 @ 05:00) (83/83 - 146/87)  ABP(mean): 72 (10-10-18 @ 05:00) (63 - 112)  Wt(kg): --  CVP(cm H2O): --  VBG - ( 10 Oct 2018 05:17 )  pH: 7.27  /  pCO2: 58    /  pO2: 37    / HCO3: 23    / Base Excess: -0.8  /  SaO2: 59     Lactate: x                Lactate, Blood: 2.9 mmol/L (10-10 @ 01:16)    Exam: Regular  Cardiac Rhythm: Afib  Perfusion     [x ]Adequate   [ ]Inadequate  Mentation   [ x]Normal       [ ]Reduced  Extremities  [ x]Warm         [ ]Cool  Volume Status [ ]Hypervolemic [ ]Euvolemic [ x]Hypovolemic  Meds: digoxin  Injectable 0.125 milliGRAM(s) IV Push every 24 hours        GI/NUTRITION  Exam: softly distended, tympanic, no rebound or guarding, midline dressing in place c/d/i, sis draining serosang  Diet: NPO  Meds: pantoprazole  Injectable 40 milliGRAM(s) IV Push two times a day      GENITOURINARY  I&O's Detail    10-08 @ 07:01  -  10-09 @ 07:00  --------------------------------------------------------  IN:    diltiazem Infusion: 20 mL    fentaNYL  Infusion: 71 mL    fentaNYL  Infusion: 88.8 mL    multiple electrolytes Injection Type 1multiple electrolytes Injection Type 1: 1750 mL    norepinephrine Infusion: 224.3 mL    Plasma: 638 mL    propofol Infusion: 87.7 mL    Solution: 50 mL    Solution: 1000 mL    Solution: 50 mL  Total IN: 3979.8 mL    OUT:    Drain: 190 mL    Indwelling Catheter - Urethral: 1175 mL  Total OUT: 1365 mL    Total NET: 2614.8 mL      10-09 @ 07:01  -  10-10 @ 05:37  --------------------------------------------------------  IN:    fentaNYL  Infusion: 133.2 mL    Lactated Ringers IV Bolus: 1000 mL    multiple electrolytes Injection Type 1: 1000 mL    multiple electrolytes Injection Type 1multiple electrolytes Injection Type 1: 3500 mL    norepinephrine Infusion: 28 mL    Solution: 200 mL    Solution: 200 mL  Total IN: 6061.2 mL    OUT:    Drain: 105 mL    Indwelling Catheter - Urethral: 690 mL  Total OUT: 795 mL    Total NET: 5266.2 mL          10-10    133<L>  |  95<L>  |  25.0<H>  ----------------------------<  121<H>  5.3   |  23.0  |  2.53<H>    Ca    8.5<L>      10 Oct 2018 01:16  Phos  6.0     10-09  Mg     2.6     10-09    TPro  5.3<L>  /  Alb  2.5<L>  /  TBili  1.5  /  DBili  x   /  AST  461<H>  /  ALT  196<H>  /  AlkPhos  59  10-09    [ x] Khan catheter, indication: critically ill   Meds: calcium gluconate IVPB 1 Gram(s) IV Intermittent once  multiple electrolytes Injection Type 1 1000 milliLiter(s) IV Continuous <Continuous>        HEMATOLOGIC  Meds: heparin  Injectable 7500 Unit(s) SubCutaneous every 8 hours    [x] VTE Prophylaxis                        11.2   12.6  )-----------( 311      ( 09 Oct 2018 05:13 )             36.2     PT/INR - ( 09 Oct 2018 05:13 )   PT: 21.0 sec;   INR: 1.88 ratio         PTT - ( 09 Oct 2018 05:13 )  PTT:36.9 sec  Transfusion     [ ] PRBC   [ ] Platelets   [ ] FFP   [ ] Cryoprecipitate      INFECTIOUS DISEASES  T(C): 36.4 (10-10-18 @ 04:11), Max: 36.9 (10-09-18 @ 12:00)  Wt(kg): --    Recent Cultures:  Specimen Source: .Body Fluid peritoneal fluid, 10-08 @ 21:08; Results   No growth at 1 day.  Culture in progress; Gram Stain:   Moderate White blood cells  No organisms seen; Organism: --    Meds: piperacillin/tazobactam IVPB. 3.375 Gram(s) IV Intermittent every 8 hours        ENDOCRINE  Capillary Blood Glucose    Meds:       ACCESS DEVICES:  [ ] Peripheral IV  [x ] Central Venous Line	[ ] R	[ ] L	[ ] IJ	[ ] Fem	[ ] SC	Placed:   [ ] Arterial Line		[ ] R	[ ] L	[ ] Fem	[ ] Rad	[ ] Ax	Placed:   [ ] PICC:					[ ] Mediport  [ ] Urinary Catheter, Date Placed:   [ ] Necessity of urinary, arterial, and venous catheters discussed    OTHER MEDICATIONS:  chlorhexidine 2% Cloths 1 Application(s) Topical daily      CODE STATUS:     IMAGING:

## 2018-10-11 DIAGNOSIS — A41.9 SEPSIS, UNSPECIFIED ORGANISM: ICD-10-CM

## 2018-10-11 DIAGNOSIS — I48.91 UNSPECIFIED ATRIAL FIBRILLATION: ICD-10-CM

## 2018-10-11 DIAGNOSIS — R19.8 OTHER SPECIFIED SYMPTOMS AND SIGNS INVOLVING THE DIGESTIVE SYSTEM AND ABDOMEN: ICD-10-CM

## 2018-10-11 LAB
ANION GAP SERPL CALC-SCNC: 12 MMOL/L — SIGNIFICANT CHANGE UP (ref 5–17)
APTT BLD: 133.4 SEC — CRITICAL HIGH (ref 27.5–37.4)
APTT BLD: 86.5 SEC — HIGH (ref 27.5–37.4)
BUN SERPL-MCNC: 32 MG/DL — HIGH (ref 8–20)
CALCIUM SERPL-MCNC: 8.6 MG/DL — SIGNIFICANT CHANGE UP (ref 8.6–10.2)
CHLORIDE SERPL-SCNC: 95 MMOL/L — LOW (ref 98–107)
CO2 SERPL-SCNC: 25 MMOL/L — SIGNIFICANT CHANGE UP (ref 22–29)
CREAT SERPL-MCNC: 2.3 MG/DL — HIGH (ref 0.5–1.3)
DIGOXIN SERPL-MCNC: 7.3 NG/ML — CRITICAL HIGH (ref 0.8–2)
EOSINOPHIL # BLD AUTO: 0 K/UL — SIGNIFICANT CHANGE UP (ref 0–0.5)
EOSINOPHIL NFR BLD AUTO: 0 % — SIGNIFICANT CHANGE UP (ref 0–5)
GLUCOSE SERPL-MCNC: 97 MG/DL — SIGNIFICANT CHANGE UP (ref 70–115)
HCT VFR BLD CALC: 33.6 % — LOW (ref 42–52)
HGB BLD-MCNC: 9.8 G/DL — LOW (ref 14–18)
LYMPHOCYTES # BLD AUTO: 0.6 K/UL — LOW (ref 1–4.8)
LYMPHOCYTES # BLD AUTO: 5 % — LOW (ref 20–55)
MAGNESIUM SERPL-MCNC: 2.7 MG/DL — HIGH (ref 1.8–2.6)
MCHC RBC-ENTMCNC: 26.5 PG — LOW (ref 27–31)
MCHC RBC-ENTMCNC: 29.2 G/DL — LOW (ref 32–36)
MCV RBC AUTO: 90.8 FL — SIGNIFICANT CHANGE UP (ref 80–94)
MONOCYTES # BLD AUTO: 0.9 K/UL — HIGH (ref 0–0.8)
MONOCYTES NFR BLD AUTO: 7.3 % — SIGNIFICANT CHANGE UP (ref 3–10)
NEUTROPHILS # BLD AUTO: 10.6 K/UL — HIGH (ref 1.8–8)
NEUTROPHILS NFR BLD AUTO: 87.4 % — HIGH (ref 37–73)
PHOSPHATE SERPL-MCNC: 5.3 MG/DL — HIGH (ref 2.4–4.7)
PLATELET # BLD AUTO: 268 K/UL — SIGNIFICANT CHANGE UP (ref 150–400)
POTASSIUM SERPL-MCNC: 5.2 MMOL/L — SIGNIFICANT CHANGE UP (ref 3.5–5.3)
POTASSIUM SERPL-SCNC: 5.2 MMOL/L — SIGNIFICANT CHANGE UP (ref 3.5–5.3)
RBC # BLD: 3.7 M/UL — LOW (ref 4.6–6.2)
RBC # FLD: 16.7 % — HIGH (ref 11–15.6)
SODIUM SERPL-SCNC: 132 MMOL/L — LOW (ref 135–145)
WBC # BLD: 12.1 K/UL — HIGH (ref 4.8–10.8)
WBC # FLD AUTO: 12.1 K/UL — HIGH (ref 4.8–10.8)

## 2018-10-11 PROCEDURE — 99232 SBSQ HOSP IP/OBS MODERATE 35: CPT

## 2018-10-11 RX ORDER — CLARITHROMYCIN 500 MG
500 TABLET ORAL
Qty: 0 | Refills: 0 | Status: DISCONTINUED | OUTPATIENT
Start: 2018-10-11 | End: 2018-10-16

## 2018-10-11 RX ORDER — AMOXICILLIN 250 MG/5ML
1000 SUSPENSION, RECONSTITUTED, ORAL (ML) ORAL
Qty: 0 | Refills: 0 | Status: DISCONTINUED | OUTPATIENT
Start: 2018-10-11 | End: 2018-10-16

## 2018-10-11 RX ORDER — PANTOPRAZOLE SODIUM 20 MG/1
40 TABLET, DELAYED RELEASE ORAL
Qty: 0 | Refills: 0 | Status: DISCONTINUED | OUTPATIENT
Start: 2018-10-11 | End: 2018-10-13

## 2018-10-11 RX ADMIN — PIPERACILLIN AND TAZOBACTAM 25 GRAM(S): 4; .5 INJECTION, POWDER, LYOPHILIZED, FOR SOLUTION INTRAVENOUS at 05:18

## 2018-10-11 RX ADMIN — Medication 1000 MILLIGRAM(S): at 17:28

## 2018-10-11 RX ADMIN — PANTOPRAZOLE SODIUM 40 MILLIGRAM(S): 20 TABLET, DELAYED RELEASE ORAL at 05:18

## 2018-10-11 RX ADMIN — Medication 0.12 MILLIGRAM(S): at 02:21

## 2018-10-11 RX ADMIN — Medication 500 MILLIGRAM(S): at 17:28

## 2018-10-11 RX ADMIN — BENZOCAINE AND MENTHOL 1 LOZENGE: 5; 1 LIQUID ORAL at 00:14

## 2018-10-11 RX ADMIN — CHLORHEXIDINE GLUCONATE 1 APPLICATION(S): 213 SOLUTION TOPICAL at 12:22

## 2018-10-11 RX ADMIN — HYDROMORPHONE HYDROCHLORIDE 0.5 MILLIGRAM(S): 2 INJECTION INTRAMUSCULAR; INTRAVENOUS; SUBCUTANEOUS at 12:17

## 2018-10-11 RX ADMIN — PANTOPRAZOLE SODIUM 40 MILLIGRAM(S): 20 TABLET, DELAYED RELEASE ORAL at 17:28

## 2018-10-11 RX ADMIN — HYDROMORPHONE HYDROCHLORIDE 0.5 MILLIGRAM(S): 2 INJECTION INTRAMUSCULAR; INTRAVENOUS; SUBCUTANEOUS at 12:32

## 2018-10-11 NOTE — PROGRESS NOTE ADULT - ASSESSMENT
58yMale presenting with: New onset A-fib rate controlled, resolved shock/hypovolemia, perf DU with Skyler Patch, morbid obesity, resolved lactic acidosis      Neurological: Continue current pain medication    Neck: DC cordis    Pulmonary: Continue monitoring for worsening cough.  I/S, ambulate    Cardiovascular: Continue anticoagulant hep drip full therapeutic .  Hold Dig.  Start Dilt    Gastrointestinal: NGT removed, will be started on clear liquid diet tonight    Genitourinary: Continue with armstrong as was extremely difficult in OR.    ID: Finish 4 day course of Zosyn, then begin triple therapy for H pylori 14 days. Clarithromycin 500mg BID, Amoxicillin 1g BID, omeprazole 20mg BID.     Skin: ex-lap Incision to be cleaned and redressed    Lines/ Tubes: Rt axillary line and RIJ cordis to be removed    Dispo: Continue care as above.  Stable for downgrade out of SICU     CARE TIME SPENT: 35 min

## 2018-10-11 NOTE — PROGRESS NOTE ADULT - ASSESSMENT
58y Male presenting with: New onset A-fib rate controlled, POD3 DU with Skyler Patch, recovering well    Patient seen by me on behalf of ACS/Surgery floor team, remainder of care per SICU:    Neurological: Continue current pain medication  Neck: DC cordis  Pulmonary: Continue monitoring for worsening cough.  I/S, ambulate  Cardiovascular: Continue anticoagulant hep drip full therapeutic .  Hold Dig.  Start Dilt  Gastrointestinal: NGT removed, started on clear liquid diet  Genitourinary: Continue with armstrong as was extremely difficult in OR.  ID: Finish 4 day course of Zosyn, then begin triple therapy for H pylori 14 days. Clarithromycin 500mg BID, Amoxicillin 1g BID, omeprazole 20mg BID.   Skin: ex-lap Incision to be cleaned and redresse  Lines/ Tubes: Rt axillary line and RIJ cordis to be removed  Dispo: Continue care as above.  Stable for downgrade out of SICU

## 2018-10-11 NOTE — PROGRESS NOTE ADULT - SUBJECTIVE AND OBJECTIVE BOX
INTERVAL HPI/OVERNIGHT EVENTS/SUBJECTIVE: 10/10: Heparin drip started. Zosyn stays. Cards does not feel RUBEN worth risk and will likely convert back to A -fib in acute settings. ON no events, DIG elevated, dig 7.3 dced resume when indicated, PTT at go kept at 20U, h pylori positive. Pt feeling overall well, admits to slight discomfort at incision site made worse with cough, denies HA, N/V, chills. Pt states cough is productive, denies any CP or SOB.     ICU Vital Signs Last 24 Hrs  T(C): 37.1 (11 Oct 2018 16:00), Max: 37.1 (10 Oct 2018 18:37)  T(F): 98.7 (11 Oct 2018 16:00), Max: 98.7 (10 Oct 2018 18:37)  HR: 88 (11 Oct 2018 16:00) (87 - 131)  BP: --  BP(mean): --  ABP: 117/70 (11 Oct 2018 16:00) (100/61 - 146/91)  ABP(mean): 86 (11 Oct 2018 16:00) (74 - 110)  RR: 20 (11 Oct 2018 16:00) (9 - 35)  SpO2: 96% (11 Oct 2018 16:00) (89% - 100%)      I&O's Detail    10 Oct 2018 07:01  -  11 Oct 2018 07:00  --------------------------------------------------------  IN:    heparin Infusion: 420 mL    Lactated Ringers IV Bolus: 250 mL    multiple electrolytes Injection Type 1: 2275 mL    Solution: 275 mL  Total IN: 3220 mL    OUT:    Drain: 620 mL    Drain: 400 mL    Indwelling Catheter - Urethral: 1398 mL  Total OUT: 2418 mL    Total NET: 802 mL      11 Oct 2018 07:01  -  11 Oct 2018 16:51  --------------------------------------------------------  IN:    heparin Infusion: 96 mL    multiple electrolytes Injection Type 1: 675 mL    Solution: 25 mL  Total IN: 796 mL    OUT:    Drain: 290 mL    Indwelling Catheter - Urethral: 485 mL  Total OUT: 775 mL    Total NET: 21 mL            ABG - ( 10 Oct 2018 05:00 )  pH, Arterial: 7.28  pH, Blood: x     /  pCO2: 54    /  pO2: 110   / HCO3: 23    / Base Excess: -1.6  /  SaO2: 98                  MEDICATIONS  (STANDING):  amoxicillin      Capsule 1000 milliGRAM(s) Oral two times a day  chlorhexidine 2% Cloths 1 Application(s) Topical daily  clarithromycin 500 milliGRAM(s) Oral two times a day  diltiazem    Tablet 30 milliGRAM(s) Oral every 6 hours  heparin  Infusion 2000 Unit(s)/Hr (18 mL/Hr) IV Continuous <Continuous>  multiple electrolytes Injection Type 1 1000 milliLiter(s) (75 mL/Hr) IV Continuous <Continuous>  pantoprazole  Injectable 40 milliGRAM(s) IV Push two times a day    MEDICATIONS  (PRN):  HYDROmorphone  Injectable 0.5 milliGRAM(s) IV Push every 3 hours PRN Moderate Pain (4 - 6)  HYDROmorphone  Injectable 1 milliGRAM(s) IV Push every 3 hours PRN Severe Pain (7 - 10)      NUTRITION/IVF:     CENTRAL LINE:  Cordis LOCATION:  [facility] DATE INSERTED:  10/9     ARMSRTONG: Yes    A-LINE: Yes   LOCATION: Right Axillary  DATE INSERTED:  10/9        PHYSICAL EXAM:     Gen: NAD, Well appearing, No cyanosis, No Pallor.    Eyes: PERRL ~ 3mm, EOMI,     Neurological: A&Ox3, GCS 15, No focal deficit.     ENT: Clear canals, clear throat.      Neck: Supple. NT AT, limited range of motion due to cordis insertion.  No JVD. No meningeal signs    Pulmonary: NAD, BL scattered rhonci       Cardiovascular: A-fib     Gastrointestinal: ND, Soft, NT.    Genitourinary: Armstrong in place, tolerated well    Skin: Bandaged ex-lap wound 3 days s/p open cholectomy            LABS:  CBC Full  -  ( 11 Oct 2018 03:10 )  WBC Count : 12.1 K/uL  Hemoglobin : 9.8 g/dL  Hematocrit : 33.6 %  Platelet Count - Automated : 268 K/uL  Mean Cell Volume : 90.8 fl  Mean Cell Hemoglobin : 26.5 pg  Mean Cell Hemoglobin Concentration : 29.2 g/dL  Auto Neutrophil # : 10.6 K/uL  Auto Lymphocyte # : 0.6 K/uL  Auto Monocyte # : 0.9 K/uL  Auto Eosinophil # : 0.0 K/uL  Auto Basophil # : x  Auto Neutrophil % : 87.4 %  Auto Lymphocyte % : 5.0 %  Auto Monocyte % : 7.3 %  Auto Eosinophil % : 0.0 %  Auto Basophil % : x    10-11    132<L>  |  95<L>  |  32.0<H>  ----------------------------<  97  5.2   |  25.0  |  2.30<H>    Ca    8.6      11 Oct 2018 03:10  Phos  5.3     10-11  Mg     2.7     10-11    TPro  5.7<L>  /  Alb  3.2<L>  /  TBili  0.9  /  DBili  x   /  AST  125<H>  /  ALT  132<H>  /  AlkPhos  48  1010    PTT - ( 11 Oct 2018 09:17 )  PTT:133.4 sec  Urinalysis Basic - ( 09 Oct 2018 21:35 )    Color: Yellow / Appearance: Slightly Turbid / S.025 / pH: x  Gluc: x / Ketone: Negative  / Bili: Negative / Urobili: 1 mg/dL   Blood: x / Protein: 100 mg/dL / Nitrite: Negative   Leuk Esterase: Trace / RBC: 11-25 /HPF / WBC 0-2   Sq Epi: x / Non Sq Epi: Occasional / Bacteria: Occasional      RECENT CULTURES:  10-08 .Body Fluid peritoneal fluid XXXX   Moderate White blood cells  No organisms seen   No growth at 3 days.  Culture in progress    10-08 .Blood Blood-Venous XXXX XXXX   No growth at 48 hours    10-08 .Blood Blood-Venous XXXX XXXX   No growth at 48 hours        LIVER FUNCTIONS - ( 10 Oct 2018 05:53 )  Alb: 3.2 g/dL / Pro: 5.7 g/dL / ALK PHOS: 48 U/L / ALT: 132 U/L / AST: 125 U/L / GGT: x               CAPILLARY BLOOD GLUCOSE      RADIOLOGY & ADDITIONAL STUDIES:    ASSESSMENT/PLAN:  58yMale presenting with: New onset A-fib rate controlled, resolved shock/hypovolemia, perf DU with Skyler Patch, morbid obesity, resolved lactic acidosis      Neurological: Continue current pain medication    Neck: DC cordis    Pulmonary: Continue monitoring for worsening cough.  I/S, ambulate    Cardiovascular: Continue anticoagulant hep drip full therapeutic .  Hold Dig.  Start Dilt    Gastrointestinal: NGT removed, will be started on clear liquid diet tonight    Genitourinary: Continue with armstrong as was extremely difficult in OR.    ID: Finish 4 day course of Zosyn, then begin triple therapy for H pylori 14 days. Clarithromycin 500mg BID, Amoxicillin 1g BID, omeprazole 20mg BID.     Skin: ex-lap Incision to be cleaned and redressed    Lines/ Tubes: Rt axillary line and RIJ cordis to be removed    Dispo: Continue care as above.  Stable for downgrade out of SICU     CARE TIME SPENT: 35 min

## 2018-10-11 NOTE — PROGRESS NOTE ADULT - SUBJECTIVE AND OBJECTIVE BOX
INTERVAL HPI/OVERNIGHT EVENTS:    SUBJECTIVE:  POD3 tara patch repair for perforated gastric ulcer. No acute events today. Tolerating clears and pending transfer to floor    MEDICATIONS  (STANDING):  amoxicillin      Capsule 1000 milliGRAM(s) Oral two times a day  chlorhexidine 2% Cloths 1 Application(s) Topical daily  clarithromycin 500 milliGRAM(s) Oral two times a day  diltiazem    Tablet 30 milliGRAM(s) Oral every 6 hours  heparin  Infusion 2000 Unit(s)/Hr (18 mL/Hr) IV Continuous <Continuous>  multiple electrolytes Injection Type 1 1000 milliLiter(s) (75 mL/Hr) IV Continuous <Continuous>  pantoprazole  Injectable 40 milliGRAM(s) IV Push two times a day    MEDICATIONS  (PRN):  HYDROmorphone  Injectable 0.5 milliGRAM(s) IV Push every 3 hours PRN Moderate Pain (4 - 6)  HYDROmorphone  Injectable 1 milliGRAM(s) IV Push every 3 hours PRN Severe Pain (7 - 10)      Vital Signs Last 24 Hrs  T(C): 36.4 (11 Oct 2018 20:00), Max: 37.1 (11 Oct 2018 16:00)  T(F): 97.5 (11 Oct 2018 20:00), Max: 98.7 (11 Oct 2018 16:00)  HR: 91 (11 Oct 2018 20:00) (87 - 131)  BP: 125/87 (11 Oct 2018 20:00) (125/87 - 125/87)  BP(mean): 103 (11 Oct 2018 20:00) (103 - 103)  RR: 24 (11 Oct 2018 20:00) (9 - 35)  SpO2: 98% (11 Oct 2018 20:00) (89% - 100%)      PHYSICAL EXAM:   Gen: NAD, Well appearing, No cyanosis, No Pallor.  Eyes: PERRL ~ 3mm, EOMI,   Neurological: A&Ox3, GCS 15, No focal deficit.   ENT: Clear canals, clear throat.    Neck: Supple. NT AT, limited range of motion due to cordis insertion.  No JVD. No meningeal signs  Pulmonary: NAD, BL scattered rhonci     Cardiovascular: A-fib   Gastrointestinal: ND, Soft, NT.  Genitourinary: Khan in place, tolerated well  Ex lap wound CDI        I&O's Detail    10 Oct 2018 07:01  -  11 Oct 2018 07:00  --------------------------------------------------------  IN:    heparin Infusion: 420 mL    Lactated Ringers IV Bolus: 250 mL    multiple electrolytes Injection Type 1: 2275 mL    Solution: 275 mL  Total IN: 3220 mL    OUT:    Drain: 620 mL    Drain: 400 mL    Indwelling Catheter - Urethral: 1398 mL  Total OUT: 2418 mL    Total NET: 802 mL      11 Oct 2018 07:  -  11 Oct 2018 20:49  --------------------------------------------------------  IN:    heparin Infusion: 150 mL    multiple electrolytes Injection Type 1: 900 mL    Solution: 25 mL  Total IN: 1075 mL    OUT:    Drain: 290 mL    Drain: 500 mL    Indwelling Catheter - Urethral: 485 mL  Total OUT: 1275 mL    Total NET: -200 mL          LABS:                        9.8    12.1  )-----------( 268      ( 11 Oct 2018 03:10 )             33.6     10-11    132<L>  |  95<L>  |  32.0<H>  ----------------------------<  97  5.2   |  25.0  |  2.30<H>    Ca    8.6      11 Oct 2018 03:10  Phos  5.3     10-11  Mg     2.7     10-11    TPro  5.7<L>  /  Alb  3.2<L>  /  TBili  0.9  /  DBili  x   /  AST  125<H>  /  ALT  132<H>  /  AlkPhos  48  10-10    PTT - ( 11 Oct 2018 09:17 )  PTT:133.4 sec  Urinalysis Basic - ( 09 Oct 2018 21:35 )    Color: Yellow / Appearance: Slightly Turbid / S.025 / pH: x  Gluc: x / Ketone: Negative  / Bili: Negative / Urobili: 1 mg/dL   Blood: x / Protein: 100 mg/dL / Nitrite: Negative   Leuk Esterase: Trace / RBC: 11-25 /HPF / WBC 0-2   Sq Epi: x / Non Sq Epi: Occasional / Bacteria: Occasional

## 2018-10-11 NOTE — PROGRESS NOTE ADULT - SUBJECTIVE AND OBJECTIVE BOX
INTERVAL HISTORY:  denies CP  	  MEDICATIONS:    piperacillin/tazobactam IVPB. 3.375 Gram(s) IV Intermittent every 8 hours  HYDROmorphone  Injectable 0.5 milliGRAM(s) IV Push every 3 hours PRN  HYDROmorphone  Injectable 1 milliGRAM(s) IV Push every 3 hours PRN  pantoprazole  Injectable 40 milliGRAM(s) IV Push two times a day  chlorhexidine 2% Cloths 1 Application(s) Topical daily  heparin  Infusion 2000 Unit(s)/Hr IV Continuous <Continuous>  multiple electrolytes Injection Type 1 1000 milliLiter(s) IV Continuous <Continuous>        PHYSICAL EXAM:  T(C): 36.1 (10-11-18 @ 08:00), Max: 37.1 (10-10-18 @ 15:31)  HR: 110 (10-11-18 @ 09:00) (87 - 131)  BP: --  RR: 24 (10-11-18 @ 09:00) (9 - 35)  SpO2: 95% (10-11-18 @ 09:00) (89% - 100%)  Wt(kg): --  I&O's Summary    10 Oct 2018 07:01  -  11 Oct 2018 07:00  --------------------------------------------------------  IN: 3220 mL / OUT: 2418 mL / NET: 802 mL    11 Oct 2018 07:01  -  11 Oct 2018 09:52  --------------------------------------------------------  IN: 215 mL / OUT: 310 mL / NET: -95 mL          Appearance: Normal	  HEENT:   Normal oral mucosa  Cardiovascular: Normal S1 S2, No JVD, No murmurs, No edema  Respiratory: Lungs clear to auscultation	  Psychiatry: A & O x 3, Mood & affect appropriate  Gastrointestinal:  Soft, Non-tender, + BS	  Skin: No rashes, No ecchymoses, No cyanosis  Neurologic: Non-focal  Extremities: Normal range of motion, No clubbing, cyanosis or edema  Vascular: Peripheral pulses palpable 2+ bilaterally    LABS:	 	                     9.8    12.1  )-----------( 268      ( 11 Oct 2018 03:10 )             33.6     10-11    132<L>  |  95<L>  |  32.0<H>  ----------------------------<  97  5.2   |  25.0  |  2.30<H>    Ca    8.6      11 Oct 2018 03:10  Phos  5.3     10-11  Mg     2.7     10-11    TPro  5.7<L>  /  Alb  3.2<L>  /  TBili  0.9  /  DBili  x   /  AST  125<H>  /  ALT  132<H>  /  AlkPhos  48  10-10      ASSESSMENT/PLAN: TAI RUBI is a 58y man with significant history of morbid obesity and smoking with perforated viscus 2/2 duodenal ulcer, found to be in rapid atrial fibrillation now in SR.  Respiratory failure. Likely septic shock, lactic acidosis. Bedside Echocardiogram reveals preserved LV systolic fx.  No major VHD.  - Rhythm/hemodynamic: rapid atrial fibrillation.  Rate improved on Cardizem drip.  Titrate to maintain resting HR <110/min.    - Keep K > 4, Mg > 2.

## 2018-10-12 LAB
ALBUMIN SERPL ELPH-MCNC: 2.8 G/DL — LOW (ref 3.3–5.2)
ALP SERPL-CCNC: 59 U/L — SIGNIFICANT CHANGE UP (ref 40–120)
ALT FLD-CCNC: 93 U/L — HIGH
ANION GAP SERPL CALC-SCNC: 14 MMOL/L — SIGNIFICANT CHANGE UP (ref 5–17)
APTT BLD: 29.1 SEC — SIGNIFICANT CHANGE UP (ref 27.5–37.4)
APTT BLD: 42.8 SEC — HIGH (ref 27.5–37.4)
APTT BLD: 94.9 SEC — HIGH (ref 27.5–37.4)
AST SERPL-CCNC: 71 U/L — HIGH
BASOPHILS # BLD AUTO: 0 K/UL — SIGNIFICANT CHANGE UP (ref 0–0.2)
BASOPHILS NFR BLD AUTO: 0.1 % — SIGNIFICANT CHANGE UP (ref 0–2)
BILIRUB SERPL-MCNC: 0.7 MG/DL — SIGNIFICANT CHANGE UP (ref 0.4–2)
BUN SERPL-MCNC: 30 MG/DL — HIGH (ref 8–20)
CALCIUM SERPL-MCNC: 8.7 MG/DL — SIGNIFICANT CHANGE UP (ref 8.6–10.2)
CHLORIDE SERPL-SCNC: 97 MMOL/L — LOW (ref 98–107)
CO2 SERPL-SCNC: 25 MMOL/L — SIGNIFICANT CHANGE UP (ref 22–29)
CREAT SERPL-MCNC: 1.77 MG/DL — HIGH (ref 0.5–1.3)
DIGOXIN SERPL-MCNC: 1 NG/ML — SIGNIFICANT CHANGE UP (ref 0.8–2)
EOSINOPHIL # BLD AUTO: 0 K/UL — SIGNIFICANT CHANGE UP (ref 0–0.5)
EOSINOPHIL NFR BLD AUTO: 0.2 % — SIGNIFICANT CHANGE UP (ref 0–5)
GLUCOSE SERPL-MCNC: 88 MG/DL — SIGNIFICANT CHANGE UP (ref 70–115)
HCT VFR BLD CALC: 37.4 % — LOW (ref 42–52)
HGB BLD-MCNC: 10.9 G/DL — LOW (ref 14–18)
LYMPHOCYTES # BLD AUTO: 0.8 K/UL — LOW (ref 1–4.8)
LYMPHOCYTES # BLD AUTO: 7 % — LOW (ref 20–55)
MAGNESIUM SERPL-MCNC: 2.6 MG/DL — SIGNIFICANT CHANGE UP (ref 1.8–2.6)
MCHC RBC-ENTMCNC: 26.9 PG — LOW (ref 27–31)
MCHC RBC-ENTMCNC: 29.1 G/DL — LOW (ref 32–36)
MCV RBC AUTO: 92.3 FL — SIGNIFICANT CHANGE UP (ref 80–94)
MONOCYTES # BLD AUTO: 0.8 K/UL — SIGNIFICANT CHANGE UP (ref 0–0.8)
MONOCYTES NFR BLD AUTO: 7.5 % — SIGNIFICANT CHANGE UP (ref 3–10)
NEUTROPHILS # BLD AUTO: 9.5 K/UL — HIGH (ref 1.8–8)
NEUTROPHILS NFR BLD AUTO: 84.8 % — HIGH (ref 37–73)
PHOSPHATE SERPL-MCNC: 4.3 MG/DL — SIGNIFICANT CHANGE UP (ref 2.4–4.7)
PLATELET # BLD AUTO: 326 K/UL — SIGNIFICANT CHANGE UP (ref 150–400)
POTASSIUM SERPL-MCNC: 5.1 MMOL/L — SIGNIFICANT CHANGE UP (ref 3.5–5.3)
POTASSIUM SERPL-SCNC: 5.1 MMOL/L — SIGNIFICANT CHANGE UP (ref 3.5–5.3)
PROT SERPL-MCNC: 5.9 G/DL — LOW (ref 6.6–8.7)
RBC # BLD: 4.05 M/UL — LOW (ref 4.6–6.2)
RBC # FLD: 16.8 % — HIGH (ref 11–15.6)
SODIUM SERPL-SCNC: 136 MMOL/L — SIGNIFICANT CHANGE UP (ref 135–145)
SURGICAL PATHOLOGY FINAL REPORT - CH: SIGNIFICANT CHANGE UP
WBC # BLD: 11.2 K/UL — HIGH (ref 4.8–10.8)
WBC # FLD AUTO: 11.2 K/UL — HIGH (ref 4.8–10.8)

## 2018-10-12 PROCEDURE — 74018 RADEX ABDOMEN 1 VIEW: CPT | Mod: 26

## 2018-10-12 RX ORDER — HEPARIN SODIUM 5000 [USP'U]/ML
7500 INJECTION INTRAVENOUS; SUBCUTANEOUS EVERY 8 HOURS
Qty: 0 | Refills: 0 | Status: DISCONTINUED | OUTPATIENT
Start: 2018-10-12 | End: 2018-10-18

## 2018-10-12 RX ORDER — HEPARIN SODIUM 5000 [USP'U]/ML
5000 INJECTION INTRAVENOUS; SUBCUTANEOUS EVERY 6 HOURS
Qty: 0 | Refills: 0 | Status: DISCONTINUED | OUTPATIENT
Start: 2018-10-12 | End: 2018-10-12

## 2018-10-12 RX ORDER — HEPARIN SODIUM 5000 [USP'U]/ML
INJECTION INTRAVENOUS; SUBCUTANEOUS
Qty: 25000 | Refills: 0 | Status: DISCONTINUED | OUTPATIENT
Start: 2018-10-12 | End: 2018-10-12

## 2018-10-12 RX ORDER — HEPARIN SODIUM 5000 [USP'U]/ML
10000 INJECTION INTRAVENOUS; SUBCUTANEOUS EVERY 6 HOURS
Qty: 0 | Refills: 0 | Status: DISCONTINUED | OUTPATIENT
Start: 2018-10-12 | End: 2018-10-12

## 2018-10-12 RX ORDER — HYDRALAZINE HCL 50 MG
25 TABLET ORAL EVERY 8 HOURS
Qty: 0 | Refills: 0 | Status: DISCONTINUED | OUTPATIENT
Start: 2018-10-12 | End: 2018-10-16

## 2018-10-12 RX ORDER — ONDANSETRON 8 MG/1
4 TABLET, FILM COATED ORAL EVERY 6 HOURS
Qty: 0 | Refills: 0 | Status: DISCONTINUED | OUTPATIENT
Start: 2018-10-12 | End: 2018-10-19

## 2018-10-12 RX ORDER — HEPARIN SODIUM 5000 [USP'U]/ML
10000 INJECTION INTRAVENOUS; SUBCUTANEOUS ONCE
Qty: 0 | Refills: 0 | Status: COMPLETED | OUTPATIENT
Start: 2018-10-12 | End: 2018-10-12

## 2018-10-12 RX ADMIN — Medication 500 MILLIGRAM(S): at 17:40

## 2018-10-12 RX ADMIN — HEPARIN SODIUM 2400 UNIT(S)/HR: 5000 INJECTION INTRAVENOUS; SUBCUTANEOUS at 09:32

## 2018-10-12 RX ADMIN — Medication 1000 MILLIGRAM(S): at 05:22

## 2018-10-12 RX ADMIN — ONDANSETRON 4 MILLIGRAM(S): 8 TABLET, FILM COATED ORAL at 11:37

## 2018-10-12 RX ADMIN — HYDROMORPHONE HYDROCHLORIDE 0.5 MILLIGRAM(S): 2 INJECTION INTRAMUSCULAR; INTRAVENOUS; SUBCUTANEOUS at 03:07

## 2018-10-12 RX ADMIN — HYDROMORPHONE HYDROCHLORIDE 0.5 MILLIGRAM(S): 2 INJECTION INTRAMUSCULAR; INTRAVENOUS; SUBCUTANEOUS at 05:20

## 2018-10-12 RX ADMIN — HEPARIN SODIUM 7500 UNIT(S): 5000 INJECTION INTRAVENOUS; SUBCUTANEOUS at 22:34

## 2018-10-12 RX ADMIN — HEPARIN SODIUM 7500 UNIT(S): 5000 INJECTION INTRAVENOUS; SUBCUTANEOUS at 13:45

## 2018-10-12 RX ADMIN — SODIUM CHLORIDE 75 MILLILITER(S): 9 INJECTION, SOLUTION INTRAVENOUS at 17:39

## 2018-10-12 RX ADMIN — HYDROMORPHONE HYDROCHLORIDE 0.5 MILLIGRAM(S): 2 INJECTION INTRAMUSCULAR; INTRAVENOUS; SUBCUTANEOUS at 12:00

## 2018-10-12 RX ADMIN — PANTOPRAZOLE SODIUM 40 MILLIGRAM(S): 20 TABLET, DELAYED RELEASE ORAL at 05:21

## 2018-10-12 RX ADMIN — Medication 25 MILLIGRAM(S): at 13:46

## 2018-10-12 RX ADMIN — ONDANSETRON 4 MILLIGRAM(S): 8 TABLET, FILM COATED ORAL at 17:39

## 2018-10-12 RX ADMIN — PANTOPRAZOLE SODIUM 40 MILLIGRAM(S): 20 TABLET, DELAYED RELEASE ORAL at 17:39

## 2018-10-12 RX ADMIN — HYDROMORPHONE HYDROCHLORIDE 1 MILLIGRAM(S): 2 INJECTION INTRAMUSCULAR; INTRAVENOUS; SUBCUTANEOUS at 08:15

## 2018-10-12 RX ADMIN — Medication 500 MILLIGRAM(S): at 05:22

## 2018-10-12 RX ADMIN — HEPARIN SODIUM 2400 UNIT(S)/HR: 5000 INJECTION INTRAVENOUS; SUBCUTANEOUS at 02:29

## 2018-10-12 RX ADMIN — HYDROMORPHONE HYDROCHLORIDE 1 MILLIGRAM(S): 2 INJECTION INTRAMUSCULAR; INTRAVENOUS; SUBCUTANEOUS at 07:48

## 2018-10-12 RX ADMIN — HYDROMORPHONE HYDROCHLORIDE 0.5 MILLIGRAM(S): 2 INJECTION INTRAMUSCULAR; INTRAVENOUS; SUBCUTANEOUS at 11:37

## 2018-10-12 RX ADMIN — Medication 1000 MILLIGRAM(S): at 17:40

## 2018-10-12 RX ADMIN — HYDROMORPHONE HYDROCHLORIDE 1 MILLIGRAM(S): 2 INJECTION INTRAMUSCULAR; INTRAVENOUS; SUBCUTANEOUS at 18:18

## 2018-10-12 RX ADMIN — Medication 25 MILLIGRAM(S): at 22:34

## 2018-10-12 RX ADMIN — CHLORHEXIDINE GLUCONATE 1 APPLICATION(S): 213 SOLUTION TOPICAL at 11:37

## 2018-10-12 RX ADMIN — HYDROMORPHONE HYDROCHLORIDE 1 MILLIGRAM(S): 2 INJECTION INTRAMUSCULAR; INTRAVENOUS; SUBCUTANEOUS at 17:47

## 2018-10-12 RX ADMIN — SODIUM CHLORIDE 75 MILLILITER(S): 9 INJECTION, SOLUTION INTRAVENOUS at 22:34

## 2018-10-12 NOTE — PROGRESS NOTE ADULT - SUBJECTIVE AND OBJECTIVE BOX
Progress/Transfer Note    HPI:   On 10/8 patient presented to [facility] emergency department c/o abdominal pain and dyspnea. Patient does not follow up with his doctors and has largely neglected his health maintenance care for several years. Radiologic imaging revealed a perforated duodenal ulcer. Patient also had marked lactic acidosis, TALA and was therefore taken to the OR emergently. Patient underwent an Ex-Lap, Skyler patch and an open cholecystectomy during a 3 hour long case. During the surgery, patient became hypotensive upon induction requiring IVF bolus and 2u of FFP to achieve an INR of 2. Post operatively, patient was transferred to the SICU for close monitoring of shock and TALA as his creatinine was on the rise. Patient was stabilised and transferred to the surgery floor on 10/11. Patient was seen resting in his bed with no major complaints. Patient denies fevers, chills, SOB, chest pain, nausea nor vomiting. Patient was hemodynamically stable and in no acute distress. Patient voiding and passing gas. Tolerating a CLD.      MEDICATIONS  (STANDING):  amoxicillin      Capsule 1000 milliGRAM(s) Oral two times a day  chlorhexidine 2% Cloths 1 Application(s) Topical daily  clarithromycin 500 milliGRAM(s) Oral two times a day  diltiazem    Tablet 30 milliGRAM(s) Oral every 6 hours  heparin  Infusion.  Unit(s)/Hr (24 mL/Hr) IV Continuous <Continuous>  heparin  Injectable 99933 Unit(s) IV Push once  multiple electrolytes Injection Type 1 1000 milliLiter(s) (75 mL/Hr) IV Continuous <Continuous>  pantoprazole  Injectable 40 milliGRAM(s) IV Push two times a day    MEDICATIONS  (PRN):  heparin  Injectable 30258 Unit(s) IV Push every 6 hours PRN For aPTT less than 40  heparin  Injectable 5000 Unit(s) IV Push every 6 hours PRN For aPTT between 40 - 57  HYDROmorphone  Injectable 0.5 milliGRAM(s) IV Push every 3 hours PRN Moderate Pain (4 - 6)  HYDROmorphone  Injectable 1 milliGRAM(s) IV Push every 3 hours PRN Severe Pain (7 - 10)      Vital Signs Last 24 Hrs  T(C): 36.5 (11 Oct 2018 22:07), Max: 37.1 (11 Oct 2018 16:00)  T(F): 97.7 (11 Oct 2018 22:07), Max: 98.7 (11 Oct 2018 16:00)  HR: 98 (11 Oct 2018 22:07) (87 - 120)  BP: 114/70 (11 Oct 2018 22:07) (114/70 - 125/87)  BP(mean): 103 (11 Oct 2018 20:00) (103 - 103)  RR: 20 (11 Oct 2018 22:07) (9 - 24)  SpO2: 98% (11 Oct 2018 22:07) (95% - 100%)    PE:  General: Pleasant man in no acute distress  HEENT: EOMI, PERRLA, moist oral mucosa, supple neck with no lymphadenopathy  Respiratory: CTAB, no wheezing nor rales  CV: RRR, normal S1 and S2, no gallops, murmurs nor rubs  GI: globus abdomen, R-sided LON drain with serous discharge, abdomen soft, non-distended, non-tender. Midline dressing c/d/i.   : armstrong in place  MSK: no pitting edema b/l  Vascular: 2+ radial and PT pulses b/l  Neuro: no neurosensory deficits, AAOX3        I&O's Detail    10 Oct 2018 07:01  -  11 Oct 2018 07:00  --------------------------------------------------------  IN:    heparin Infusion: 420 mL    Lactated Ringers IV Bolus: 250 mL    multiple electrolytes Injection Type 1: 2275 mL    Solution: 275 mL  Total IN: 3220 mL    OUT:    Drain: 620 mL    Drain: 400 mL    Indwelling Catheter - Urethral: 1398 mL  Total OUT: 2418 mL    Total NET: 802 mL      11 Oct 2018 07:01  -  12 Oct 2018 02:16  --------------------------------------------------------  IN:    heparin Infusion: 150 mL    multiple electrolytes Injection Type 1: 900 mL    Solution: 25 mL  Total IN: 1075 mL    OUT:    Drain: 290 mL    Drain: 800 mL    Indwelling Catheter - Urethral: 910 mL  Total OUT: 2000 mL    Total NET: -925 mL          LABS:                        9.8    12.1  )-----------( 268      ( 11 Oct 2018 03:10 )             33.6     10-11    132<L>  |  95<L>  |  32.0<H>  ----------------------------<  97  5.2   |  25.0  |  2.30<H>    Ca    8.6      11 Oct 2018 03:10  Phos  5.3     10-11  Mg     2.7     10-11    TPro  5.7<L>  /  Alb  3.2<L>  /  TBili  0.9  /  DBili  x   /  AST  125<H>  /  ALT  132<H>  /  AlkPhos  48  10-10    PTT - ( 11 Oct 2018 23:56 )  PTT:42.8 sec      RADIOLOGY & ADDITIONAL STUDIES:

## 2018-10-12 NOTE — PROGRESS NOTE ADULT - ASSESSMENT
A/P: 58 year old man s/p Ex-Lap, Skyler patch and an open cholecystectomy on the 10/8, transferred to the surgery floor hemodynamically stable, in no acute distress and with no major complaints.    -DVT prophylaxis  - CDL diet  -pain management  -encourage I/S  - PT/OT  -PM&R  -Trend PTT  - Continue anticoagulant hep drip full therapeutic  - Continue with armstrong as was extremely difficult in OR  -Monitor Creatinine

## 2018-10-12 NOTE — PROGRESS NOTE ADULT - SUBJECTIVE AND OBJECTIVE BOX
[facility], [facility]                                                    [address], [address], [place] [address]                                                         PHONE: [phone]    FAX: [phone] [address], [address], [place] [address]                                                 PHONE: [phone]    FAX: [phone]  *******************************************************************************  cc: abdominal pain    HPI: TAI RUBI is a 58y Male with past medical history significant for morbid obesity and smoking with perforated viscus 2/2 duodenal ulcer,lactic acidosis and TALA,  found to be in rapid atrial fibrillation converted to SR. No CP, SOB, palpitations, PND or orthopnea. No syncope. s/p Respiratory failure. Likely septic shock, lactic acidosis. Bedside Echocardiogram reveals preserved LV systolic fx.  No major VHD. Pt was taken to OR and is now s/p open cholecystectomy.    Overnight events/Subjective Assessment: no new events        No Known Allergies    PMH: none reported    PSH: none    Family Hx: No pertinent family hx in 1st degree relatives    Social: former smoker (3-4 cigarettes over the weekend), quit 2 months ago. Drinks wine over the weekend, quit drinking beer 6 months ago. No illicit drug use.    MEDICATIONS  (STANDING):  amoxicillin      Capsule 1000 milliGRAM(s) Oral two times a day  chlorhexidine 2% Cloths 1 Application(s) Topical daily  clarithromycin 500 milliGRAM(s) Oral two times a day  diltiazem    Tablet 30 milliGRAM(s) Oral every 6 hours  heparin  Infusion.  Unit(s)/Hr (24 mL/Hr) IV Continuous <Continuous>  multiple electrolytes Injection Type 1 1000 milliLiter(s) (75 mL/Hr) IV Continuous <Continuous>  pantoprazole  Injectable 40 milliGRAM(s) IV Push two times a day    MEDICATIONS  (PRN):  heparin  Injectable 42698 Unit(s) IV Push every 6 hours PRN For aPTT less than 40  heparin  Injectable 5000 Unit(s) IV Push every 6 hours PRN For aPTT between 40 - 57  HYDROmorphone  Injectable 0.5 milliGRAM(s) IV Push every 3 hours PRN Moderate Pain (4 - 6)  HYDROmorphone  Injectable 1 milliGRAM(s) IV Push every 3 hours PRN Severe Pain (7 - 10)  ondansetron Injectable 4 milliGRAM(s) IV Push every 6 hours PRN Nausea and/or Vomiting      Vital Signs Last 24 Hrs  T(C): 36.4 (12 Oct 2018 07:44), Max: 37.1 (11 Oct 2018 16:00)  T(F): 97.6 (12 Oct 2018 07:44), Max: 98.7 (11 Oct 2018 16:00)  HR: 85 (12 Oct 2018 07:44) (85 - 108)  BP: 124/82 (12 Oct 2018 07:44) (114/70 - 137/81)  BP(mean): 103 (11 Oct 2018 20:00) (103 - 103)  RR: 18 (12 Oct 2018 05:20) (13 - 24)  SpO2: 92% (12 Oct 2018 07:44) (92% - 100%)    I&O's Detail    11 Oct 2018 07:01  -  12 Oct 2018 07:00  --------------------------------------------------------  IN:    heparin  Infusion.: 96 mL    heparin Infusion: 222 mL    multiple electrolytes Injection Type 1: 900 mL    Solution: 25 mL  Total IN: 1243 mL    OUT:    Drain: 350 mL    Drain: 1900 mL    Indwelling Catheter - Urethral: 1410 mL  Total OUT: 3660 mL    Total NET: -2417 mL        I&O's Summary    11 Oct 2018 07:01  -  12 Oct 2018 07:00  --------------------------------------------------------  IN: 1243 mL / OUT: 3660 mL / NET: -2417 mL            PHYSICAL EXAM:  General: Appears well developed, well nourished, no acute distress  HEENT: Head: normocephalic, atraumatic  Eyes: Pupils equal and reactive  Neck: Supple, no carotid bruit, no JVD, no HJR  CARDIOVASCULAR: Normal S1 and S2, I/VI murmur,No rub, or gallop  LUNGS: Clear to auscultation bilaterally, no rales, rhonchi or wheeze  ABDOMEN: Soft, nontender, non-distended, positive bowel sounds, no mass or bruit  EXTREMITIES: Mild bilat LE edema, distal pulses WNL  SKIN: Warm and dry with normal turgor  NEURO: Alert & oriented x 3, grossly intact  PSYCH: normal mood and affect    REVIEW OF SYSTEMS: As above. no other pertinent ROS          LABS:                        10.9   11.2  )-----------( 326      ( 12 Oct 2018 07:17 )             37.4     10-12    136  |  97<L>  |  30.0<H>  ----------------------------<  88  5.1   |  25.0  |  1.77<H>    Ca    8.7      12 Oct 2018 07:17  Phos  4.3     10-12  Mg     2.6     10-12    TPro  5.9<L>  /  Alb  2.8<L>  /  TBili  0.7  /  DBili  x   /  AST  71<H>  /  ALT  93<H>  /  AlkPhos  59  10-12        PTT - ( 12 Oct 2018 09:01 )  PTT:94.9 sec  Serum Pro-Brain Natriuretic Peptide: 6008 pg/mL (10-10 @ 05:53)  Serum Pro-Brain Natriuretic Peptide: 5008 pg/mL (10-08 @ 08:26)  serum  Lipids:         RADIOLOGY & ADDITIONAL STUDIES:      ECHO: < from: TTE Echo Complete w/Doppler (10.09.18 @ 08:31) >  Summary:   1. Endocardial visualization was enhanced with intravenous echo contrast.   2. Mildly enlarged left atrium.   3. Global diffuse hypokinesis. Left ventricular ejection fraction, by   visual estimation, is 30 to 35%.   4. Mildly enlarged right atrium.   5. The right ventricular size mildly enlarged. Moderately reduced RV   systolic function.   6. No significant valvular abnormality.   7. There is no evidence of pericardial effusion.    < end of copied text >      ASSESSMENT AND PLAN:  In summary, TAI RUBI is a 58y Male with past medical history significant for morbid obesity and smoking with perforated viscus 2/2 duodenal ulcer,lactic acidosis and TALA,  found to be in rapid atrial fibrillation converted to SR. No CP, SOB, palpitations, PND or orthopnea. No syncope. s/p Respiratory failure. Likely septic shock, lactic acidosis. Bedside Echocardiogram reveals decreased LV systolic fx (30-35%).  No major VHD. Pt was taken to OR and is now s/p open cholecystectomy.   - Rhythm/hemodynamic:   - PAF. s/p rapid atrial fibrillation in light of perforated viscous.  Now SR. Would defer AC longterm for now. Maintain diltiazem (long acting on DC)  - Mild LE edema. Low albumin noted.  - Keep K > 4, Mg > 2.   - Low EF. May have been due to sepsis and OCHOA. Would repeat as an outpt  - TALA. Defer ACEI. Will add hydralazine due to low EF  - ABX per medical  - No further inpt cardiac evaluation planned. Will pursue repeat echo and ischemic evaluation as an outpt  - Please reconsult PRN as needed    Rosalinda Driscoll MD [facility], [facility]                                                    [address], [address], [place] [address]                                                         PHONE: [phone]    FAX: [phone] [address], [address], [place] [address]                                                 PHONE: [phone]    FAX: [phone]  *******************************************************************************  cc: abdominal pain    HPI: TAI RUBI is a 58y Male with past medical history significant for morbid obesity and smoking with perforated viscus 2/2 duodenal ulcer,lactic acidosis and TALA,  found to be in rapid atrial fibrillation converted to SR. No CP, SOB, palpitations, PND or orthopnea. No syncope. s/p Respiratory failure. Likely septic shock, lactic acidosis. Bedside Echocardiogram reveals decreased LV systolic fx.  No major VHD. Pt was taken to OR and is now s/p open cholecystectomy.    Overnight events/Subjective Assessment: no new events        No Known Allergies    PMH: none reported    PSH: none    Family Hx: No pertinent family hx in 1st degree relatives    Social: former smoker (3-4 cigarettes over the weekend), quit 2 months ago. Drinks wine over the weekend, quit drinking beer 6 months ago. No illicit drug use.    MEDICATIONS  (STANDING):  amoxicillin      Capsule 1000 milliGRAM(s) Oral two times a day  chlorhexidine 2% Cloths 1 Application(s) Topical daily  clarithromycin 500 milliGRAM(s) Oral two times a day  diltiazem    Tablet 30 milliGRAM(s) Oral every 6 hours  heparin  Infusion.  Unit(s)/Hr (24 mL/Hr) IV Continuous <Continuous>  multiple electrolytes Injection Type 1 1000 milliLiter(s) (75 mL/Hr) IV Continuous <Continuous>  pantoprazole  Injectable 40 milliGRAM(s) IV Push two times a day    MEDICATIONS  (PRN):  heparin  Injectable 65733 Unit(s) IV Push every 6 hours PRN For aPTT less than 40  heparin  Injectable 5000 Unit(s) IV Push every 6 hours PRN For aPTT between 40 - 57  HYDROmorphone  Injectable 0.5 milliGRAM(s) IV Push every 3 hours PRN Moderate Pain (4 - 6)  HYDROmorphone  Injectable 1 milliGRAM(s) IV Push every 3 hours PRN Severe Pain (7 - 10)  ondansetron Injectable 4 milliGRAM(s) IV Push every 6 hours PRN Nausea and/or Vomiting      Vital Signs Last 24 Hrs  T(C): 36.4 (12 Oct 2018 07:44), Max: 37.1 (11 Oct 2018 16:00)  T(F): 97.6 (12 Oct 2018 07:44), Max: 98.7 (11 Oct 2018 16:00)  HR: 85 (12 Oct 2018 07:44) (85 - 108)  BP: 124/82 (12 Oct 2018 07:44) (114/70 - 137/81)  BP(mean): 103 (11 Oct 2018 20:00) (103 - 103)  RR: 18 (12 Oct 2018 05:20) (13 - 24)  SpO2: 92% (12 Oct 2018 07:44) (92% - 100%)    I&O's Detail    11 Oct 2018 07:01  -  12 Oct 2018 07:00  --------------------------------------------------------  IN:    heparin  Infusion.: 96 mL    heparin Infusion: 222 mL    multiple electrolytes Injection Type 1: 900 mL    Solution: 25 mL  Total IN: 1243 mL    OUT:    Drain: 350 mL    Drain: 1900 mL    Indwelling Catheter - Urethral: 1410 mL  Total OUT: 3660 mL    Total NET: -2417 mL        I&O's Summary    11 Oct 2018 07:01  -  12 Oct 2018 07:00  --------------------------------------------------------  IN: 1243 mL / OUT: 3660 mL / NET: -2417 mL            PHYSICAL EXAM:  General: Appears well developed, well nourished, no acute distress  HEENT: Head: normocephalic, atraumatic  Eyes: Pupils equal and reactive  Neck: Supple, no carotid bruit, no JVD, no HJR  CARDIOVASCULAR: Normal S1 and S2, I/VI murmur,No rub, or gallop  LUNGS: Clear to auscultation bilaterally, no rales, rhonchi or wheeze  ABDOMEN: Soft, nontender, non-distended, positive bowel sounds, no mass or bruit  EXTREMITIES: Mild bilat LE edema, distal pulses WNL  SKIN: Warm and dry with normal turgor  NEURO: Alert & oriented x 3, grossly intact  PSYCH: normal mood and affect    REVIEW OF SYSTEMS: As above. no other pertinent ROS          LABS:                        10.9   11.2  )-----------( 326      ( 12 Oct 2018 07:17 )             37.4     10-12    136  |  97<L>  |  30.0<H>  ----------------------------<  88  5.1   |  25.0  |  1.77<H>    Ca    8.7      12 Oct 2018 07:17  Phos  4.3     10-12  Mg     2.6     10-12    TPro  5.9<L>  /  Alb  2.8<L>  /  TBili  0.7  /  DBili  x   /  AST  71<H>  /  ALT  93<H>  /  AlkPhos  59  10-12        PTT - ( 12 Oct 2018 09:01 )  PTT:94.9 sec  Serum Pro-Brain Natriuretic Peptide: 6008 pg/mL (10-10 @ 05:53)  Serum Pro-Brain Natriuretic Peptide: 5008 pg/mL (10-08 @ 08:26)  serum  Lipids:         RADIOLOGY & ADDITIONAL STUDIES:      ECHO: < from: TTE Echo Complete w/Doppler (10.09.18 @ 08:31) >  Summary:   1. Endocardial visualization was enhanced with intravenous echo contrast.   2. Mildly enlarged left atrium.   3. Global diffuse hypokinesis. Left ventricular ejection fraction, by   visual estimation, is 30 to 35%.   4. Mildly enlarged right atrium.   5. The right ventricular size mildly enlarged. Moderately reduced RV   systolic function.   6. No significant valvular abnormality.   7. There is no evidence of pericardial effusion.    < end of copied text >      ASSESSMENT AND PLAN:  In summary, TAI RUBI is a 58y Male with past medical history significant for morbid obesity and smoking with perforated viscus 2/2 duodenal ulcer,lactic acidosis and TLAA,  found to be in rapid atrial fibrillation converted to SR. No CP, SOB, palpitations, PND or orthopnea. No syncope. s/p Respiratory failure. Likely septic shock, lactic acidosis. Bedside Echocardiogram reveals decreased LV systolic fx (30-35%).  No major VHD. Pt was taken to OR and is now s/p open cholecystectomy.   - Rhythm/hemodynamic:   - PAF. s/p rapid atrial fibrillation in light of perforated viscous.  Now SR. Would defer AC longterm for now. Maintain diltiazem (long acting on DC)  - Mild LE edema. Low albumin noted.  - Keep K > 4, Mg > 2.   - Low EF. May have been due to sepsis and OCHOA. Would repeat echo as an outpt  - TALA. Defer ACEI. Will add hydralazine due to low EF  - ABX per medical  - No further inpt cardiac evaluation planned. Will pursue repeat echo and ischemic evaluation as an outpt when acute pathology resolved  - Please reconsult PRN as needed    Rosalinda Driscoll MD

## 2018-10-13 LAB
ALBUMIN SERPL ELPH-MCNC: 3 G/DL — LOW (ref 3.3–5.2)
ALP SERPL-CCNC: 56 U/L — SIGNIFICANT CHANGE UP (ref 40–120)
ALT FLD-CCNC: 71 U/L — HIGH
ANION GAP SERPL CALC-SCNC: 14 MMOL/L — SIGNIFICANT CHANGE UP (ref 5–17)
AST SERPL-CCNC: 42 U/L — HIGH
BILIRUB SERPL-MCNC: 0.6 MG/DL — SIGNIFICANT CHANGE UP (ref 0.4–2)
BUN SERPL-MCNC: 24 MG/DL — HIGH (ref 8–20)
CALCIUM SERPL-MCNC: 8.8 MG/DL — SIGNIFICANT CHANGE UP (ref 8.6–10.2)
CHLORIDE SERPL-SCNC: 96 MMOL/L — LOW (ref 98–107)
CO2 SERPL-SCNC: 28 MMOL/L — SIGNIFICANT CHANGE UP (ref 22–29)
CREAT SERPL-MCNC: 1.42 MG/DL — HIGH (ref 0.5–1.3)
CULTURE RESULTS: SIGNIFICANT CHANGE UP
GLUCOSE SERPL-MCNC: 83 MG/DL — SIGNIFICANT CHANGE UP (ref 70–115)
HCT VFR BLD CALC: 39.4 % — LOW (ref 42–52)
HGB BLD-MCNC: 11.2 G/DL — LOW (ref 14–18)
MAGNESIUM SERPL-MCNC: 2.3 MG/DL — SIGNIFICANT CHANGE UP (ref 1.6–2.6)
MCHC RBC-ENTMCNC: 26.9 PG — LOW (ref 27–31)
MCHC RBC-ENTMCNC: 28.4 G/DL — LOW (ref 32–36)
MCV RBC AUTO: 94.5 FL — HIGH (ref 80–94)
PHOSPHATE SERPL-MCNC: 4.2 MG/DL — SIGNIFICANT CHANGE UP (ref 2.4–4.7)
PLATELET # BLD AUTO: 320 K/UL — SIGNIFICANT CHANGE UP (ref 150–400)
POTASSIUM SERPL-MCNC: 5.3 MMOL/L — SIGNIFICANT CHANGE UP (ref 3.5–5.3)
POTASSIUM SERPL-SCNC: 5.3 MMOL/L — SIGNIFICANT CHANGE UP (ref 3.5–5.3)
PROT SERPL-MCNC: 5.8 G/DL — LOW (ref 6.6–8.7)
RBC # BLD: 4.17 M/UL — LOW (ref 4.6–6.2)
RBC # FLD: 16.7 % — HIGH (ref 11–15.6)
SODIUM SERPL-SCNC: 138 MMOL/L — SIGNIFICANT CHANGE UP (ref 135–145)
SPECIMEN SOURCE: SIGNIFICANT CHANGE UP
WBC # BLD: 6.2 K/UL — SIGNIFICANT CHANGE UP (ref 4.8–10.8)
WBC # FLD AUTO: 6.2 K/UL — SIGNIFICANT CHANGE UP (ref 4.8–10.8)

## 2018-10-13 RX ORDER — PANTOPRAZOLE SODIUM 20 MG/1
40 TABLET, DELAYED RELEASE ORAL EVERY 12 HOURS
Qty: 0 | Refills: 0 | Status: DISCONTINUED | OUTPATIENT
Start: 2018-10-13 | End: 2018-10-16

## 2018-10-13 RX ADMIN — HYDROMORPHONE HYDROCHLORIDE 1 MILLIGRAM(S): 2 INJECTION INTRAMUSCULAR; INTRAVENOUS; SUBCUTANEOUS at 09:31

## 2018-10-13 RX ADMIN — HYDROMORPHONE HYDROCHLORIDE 1 MILLIGRAM(S): 2 INJECTION INTRAMUSCULAR; INTRAVENOUS; SUBCUTANEOUS at 23:18

## 2018-10-13 RX ADMIN — HEPARIN SODIUM 7500 UNIT(S): 5000 INJECTION INTRAVENOUS; SUBCUTANEOUS at 05:51

## 2018-10-13 RX ADMIN — Medication 1000 MILLIGRAM(S): at 05:51

## 2018-10-13 RX ADMIN — HYDROMORPHONE HYDROCHLORIDE 1 MILLIGRAM(S): 2 INJECTION INTRAMUSCULAR; INTRAVENOUS; SUBCUTANEOUS at 00:28

## 2018-10-13 RX ADMIN — ONDANSETRON 4 MILLIGRAM(S): 8 TABLET, FILM COATED ORAL at 00:14

## 2018-10-13 RX ADMIN — PANTOPRAZOLE SODIUM 40 MILLIGRAM(S): 20 TABLET, DELAYED RELEASE ORAL at 17:14

## 2018-10-13 RX ADMIN — Medication 500 MILLIGRAM(S): at 05:52

## 2018-10-13 RX ADMIN — Medication 500 MILLIGRAM(S): at 17:14

## 2018-10-13 RX ADMIN — HYDROMORPHONE HYDROCHLORIDE 1 MILLIGRAM(S): 2 INJECTION INTRAMUSCULAR; INTRAVENOUS; SUBCUTANEOUS at 23:45

## 2018-10-13 RX ADMIN — HYDROMORPHONE HYDROCHLORIDE 1 MILLIGRAM(S): 2 INJECTION INTRAMUSCULAR; INTRAVENOUS; SUBCUTANEOUS at 17:14

## 2018-10-13 RX ADMIN — HEPARIN SODIUM 7500 UNIT(S): 5000 INJECTION INTRAVENOUS; SUBCUTANEOUS at 22:41

## 2018-10-13 RX ADMIN — ONDANSETRON 4 MILLIGRAM(S): 8 TABLET, FILM COATED ORAL at 23:18

## 2018-10-13 RX ADMIN — HYDROMORPHONE HYDROCHLORIDE 1 MILLIGRAM(S): 2 INJECTION INTRAMUSCULAR; INTRAVENOUS; SUBCUTANEOUS at 00:13

## 2018-10-13 RX ADMIN — Medication 1000 MILLIGRAM(S): at 17:14

## 2018-10-13 RX ADMIN — HYDROMORPHONE HYDROCHLORIDE 1 MILLIGRAM(S): 2 INJECTION INTRAMUSCULAR; INTRAVENOUS; SUBCUTANEOUS at 18:15

## 2018-10-13 RX ADMIN — ONDANSETRON 4 MILLIGRAM(S): 8 TABLET, FILM COATED ORAL at 17:13

## 2018-10-13 RX ADMIN — Medication 25 MILLIGRAM(S): at 05:52

## 2018-10-13 RX ADMIN — Medication 25 MILLIGRAM(S): at 22:40

## 2018-10-13 RX ADMIN — Medication 25 MILLIGRAM(S): at 14:10

## 2018-10-13 RX ADMIN — HYDROMORPHONE HYDROCHLORIDE 1 MILLIGRAM(S): 2 INJECTION INTRAMUSCULAR; INTRAVENOUS; SUBCUTANEOUS at 09:06

## 2018-10-13 RX ADMIN — HEPARIN SODIUM 7500 UNIT(S): 5000 INJECTION INTRAVENOUS; SUBCUTANEOUS at 14:11

## 2018-10-13 RX ADMIN — CHLORHEXIDINE GLUCONATE 1 APPLICATION(S): 213 SOLUTION TOPICAL at 11:55

## 2018-10-13 RX ADMIN — ONDANSETRON 4 MILLIGRAM(S): 8 TABLET, FILM COATED ORAL at 09:06

## 2018-10-13 RX ADMIN — PANTOPRAZOLE SODIUM 40 MILLIGRAM(S): 20 TABLET, DELAYED RELEASE ORAL at 05:53

## 2018-10-13 NOTE — PROGRESS NOTE ADULT - ASSESSMENT
A/P: 58 year old man s/p Ex-Lap, Skyler patch and an open cholecystectomy on the 10/8, now with ileus. Khan removed. Cr downtrending    -f/u TOV today  -DVT prophylaxis  - continue CLD  -serial abd exams  -pain management  -encourage I/S  - PT/OT  -PM&R  -Trend PTT  -Trend Cr

## 2018-10-13 NOTE — PROGRESS NOTE ADULT - SUBJECTIVE AND OBJECTIVE BOX
Khan d/c'ed around midnight. Cr has been downtrending from 2.3 to 1.7 yesterday. KUB yesterday showed ileus, not obstruction      MEDICATIONS  (STANDING):  amoxicillin      Capsule 1000 milliGRAM(s) Oral two times a day  chlorhexidine 2% Cloths 1 Application(s) Topical daily  clarithromycin 500 milliGRAM(s) Oral two times a day  diltiazem    Tablet 30 milliGRAM(s) Oral every 6 hours  heparin  Injectable 7500 Unit(s) SubCutaneous every 8 hours  hydrALAZINE 25 milliGRAM(s) Oral every 8 hours  multiple electrolytes Injection Type 1 1000 milliLiter(s) (75 mL/Hr) IV Continuous <Continuous>  pantoprazole  Injectable 40 milliGRAM(s) IV Push two times a day    MEDICATIONS  (PRN):  HYDROmorphone  Injectable 0.5 milliGRAM(s) IV Push every 3 hours PRN Moderate Pain (4 - 6)  HYDROmorphone  Injectable 1 milliGRAM(s) IV Push every 3 hours PRN Severe Pain (7 - 10)  ondansetron Injectable 4 milliGRAM(s) IV Push every 6 hours PRN Nausea and/or Vomiting      Vital Signs Last 24 Hrs  T(C): 36.4 (12 Oct 2018 20:00), Max: 36.4 (12 Oct 2018 05:20)  T(F): 97.5 (12 Oct 2018 20:00), Max: 97.6 (12 Oct 2018 05:20)  HR: 94 (13 Oct 2018 00:05) (84 - 94)  BP: 120/60 (13 Oct 2018 00:05) (107/77 - 137/81)  BP(mean): --  RR: 20 (12 Oct 2018 20:00) (18 - 20)  SpO2: 97% (12 Oct 2018 20:00) (92% - 97%)    PE:  General: NAD  Respiratory: No increased WOB  GI: globus abdomen, R-sided LON drain with serous discharge, abdomen soft, non-distended, non-tender. Midline dressing c/d/i.   : nathaniel removed    I&O's Detail    11 Oct 2018 07:01  -  12 Oct 2018 07:00  --------------------------------------------------------  IN:    heparin  Infusion.: 96 mL    heparin Infusion: 222 mL    multiple electrolytes Injection Type 1: 900 mL    Solution: 25 mL  Total IN: 1243 mL    OUT:    Drain: 350 mL    Drain: 1900 mL    Indwelling Catheter - Urethral: 1410 mL  Total OUT: 3660 mL    Total NET: -2417 mL      12 Oct 2018 07:01  -  13 Oct 2018 05:00  --------------------------------------------------------  IN:    heparin  Infusion.: 72 mL    multiple electrolytes Injection Type 1: 1425 mL  Total IN: 1497 mL    OUT:    Drain: 120 mL    Drain: 3050 mL    Indwelling Catheter - Urethral: 1400 mL    Post-Void Residual per Intermittent Catheterization: 310 mL  Total OUT: 4880 mL    Total NET: -3383 mL          LABS:                        10.9   11.2  )-----------( 326      ( 12 Oct 2018 07:17 )             37.4     10-12    136  |  97<L>  |  30.0<H>  ----------------------------<  88  5.1   |  25.0  |  1.77<H>    Ca    8.7      12 Oct 2018 07:17  Phos  4.3     10-12  Mg     2.6     10-12    TPro  5.9<L>  /  Alb  2.8<L>  /  TBili  0.7  /  DBili  x   /  AST  71<H>  /  ALT  93<H>  /  AlkPhos  59  10-12    PTT - ( 12 Oct 2018 20:41 )  PTT:29.1 sec      RADIOLOGY & ADDITIONAL STUDIES:

## 2018-10-14 LAB
ANION GAP SERPL CALC-SCNC: 15 MMOL/L — SIGNIFICANT CHANGE UP (ref 5–17)
BUN SERPL-MCNC: 16 MG/DL — SIGNIFICANT CHANGE UP (ref 8–20)
CALCIUM SERPL-MCNC: 8.2 MG/DL — LOW (ref 8.6–10.2)
CHLORIDE SERPL-SCNC: 97 MMOL/L — LOW (ref 98–107)
CO2 SERPL-SCNC: 25 MMOL/L — SIGNIFICANT CHANGE UP (ref 22–29)
CREAT SERPL-MCNC: 1.12 MG/DL — SIGNIFICANT CHANGE UP (ref 0.5–1.3)
GLUCOSE SERPL-MCNC: 77 MG/DL — SIGNIFICANT CHANGE UP (ref 70–115)
HCT VFR BLD CALC: 38.7 % — LOW (ref 42–52)
HGB BLD-MCNC: 11.3 G/DL — LOW (ref 14–18)
MAGNESIUM SERPL-MCNC: 1.9 MG/DL — SIGNIFICANT CHANGE UP (ref 1.6–2.6)
MCHC RBC-ENTMCNC: 27 PG — SIGNIFICANT CHANGE UP (ref 27–31)
MCHC RBC-ENTMCNC: 29.2 G/DL — LOW (ref 32–36)
MCV RBC AUTO: 92.6 FL — SIGNIFICANT CHANGE UP (ref 80–94)
PHOSPHATE SERPL-MCNC: 3.3 MG/DL — SIGNIFICANT CHANGE UP (ref 2.4–4.7)
PLATELET # BLD AUTO: 338 K/UL — SIGNIFICANT CHANGE UP (ref 150–400)
POTASSIUM SERPL-MCNC: 5.7 MMOL/L — HIGH (ref 3.5–5.3)
POTASSIUM SERPL-SCNC: 5.7 MMOL/L — HIGH (ref 3.5–5.3)
RBC # BLD: 4.18 M/UL — LOW (ref 4.6–6.2)
RBC # FLD: 16.8 % — HIGH (ref 11–15.6)
SODIUM SERPL-SCNC: 137 MMOL/L — SIGNIFICANT CHANGE UP (ref 135–145)
WBC # BLD: 6.6 K/UL — SIGNIFICANT CHANGE UP (ref 4.8–10.8)
WBC # FLD AUTO: 6.6 K/UL — SIGNIFICANT CHANGE UP (ref 4.8–10.8)

## 2018-10-14 PROCEDURE — 93010 ELECTROCARDIOGRAM REPORT: CPT

## 2018-10-14 RX ORDER — SODIUM CHLORIDE 9 MG/ML
3 INJECTION INTRAMUSCULAR; INTRAVENOUS; SUBCUTANEOUS EVERY 8 HOURS
Qty: 0 | Refills: 0 | Status: DISCONTINUED | OUTPATIENT
Start: 2018-10-14 | End: 2018-10-24

## 2018-10-14 RX ORDER — SODIUM POLYSTYRENE SULFONATE 4.1 MEQ/G
15 POWDER, FOR SUSPENSION ORAL ONCE
Qty: 0 | Refills: 0 | Status: COMPLETED | OUTPATIENT
Start: 2018-10-14 | End: 2018-10-14

## 2018-10-14 RX ORDER — ONDANSETRON 8 MG/1
4 TABLET, FILM COATED ORAL ONCE
Qty: 0 | Refills: 0 | Status: COMPLETED | OUTPATIENT
Start: 2018-10-14 | End: 2018-10-14

## 2018-10-14 RX ADMIN — ONDANSETRON 4 MILLIGRAM(S): 8 TABLET, FILM COATED ORAL at 05:43

## 2018-10-14 RX ADMIN — SODIUM POLYSTYRENE SULFONATE 15 GRAM(S): 4.1 POWDER, FOR SUSPENSION ORAL at 12:57

## 2018-10-14 RX ADMIN — SODIUM CHLORIDE 3 MILLILITER(S): 9 INJECTION INTRAMUSCULAR; INTRAVENOUS; SUBCUTANEOUS at 22:13

## 2018-10-14 RX ADMIN — PANTOPRAZOLE SODIUM 40 MILLIGRAM(S): 20 TABLET, DELAYED RELEASE ORAL at 17:49

## 2018-10-14 RX ADMIN — CHLORHEXIDINE GLUCONATE 1 APPLICATION(S): 213 SOLUTION TOPICAL at 12:56

## 2018-10-14 RX ADMIN — Medication 1000 MILLIGRAM(S): at 05:42

## 2018-10-14 RX ADMIN — HYDROMORPHONE HYDROCHLORIDE 1 MILLIGRAM(S): 2 INJECTION INTRAMUSCULAR; INTRAVENOUS; SUBCUTANEOUS at 06:00

## 2018-10-14 RX ADMIN — PANTOPRAZOLE SODIUM 40 MILLIGRAM(S): 20 TABLET, DELAYED RELEASE ORAL at 05:42

## 2018-10-14 RX ADMIN — Medication 500 MILLIGRAM(S): at 05:42

## 2018-10-14 RX ADMIN — SODIUM CHLORIDE 75 MILLILITER(S): 9 INJECTION, SOLUTION INTRAVENOUS at 22:24

## 2018-10-14 RX ADMIN — HYDROMORPHONE HYDROCHLORIDE 1 MILLIGRAM(S): 2 INJECTION INTRAMUSCULAR; INTRAVENOUS; SUBCUTANEOUS at 13:40

## 2018-10-14 RX ADMIN — Medication 1000 MILLIGRAM(S): at 17:49

## 2018-10-14 RX ADMIN — HEPARIN SODIUM 7500 UNIT(S): 5000 INJECTION INTRAVENOUS; SUBCUTANEOUS at 13:00

## 2018-10-14 RX ADMIN — SODIUM CHLORIDE 75 MILLILITER(S): 9 INJECTION, SOLUTION INTRAVENOUS at 12:56

## 2018-10-14 RX ADMIN — HYDROMORPHONE HYDROCHLORIDE 1 MILLIGRAM(S): 2 INJECTION INTRAMUSCULAR; INTRAVENOUS; SUBCUTANEOUS at 17:49

## 2018-10-14 RX ADMIN — Medication 500 MILLIGRAM(S): at 17:49

## 2018-10-14 RX ADMIN — Medication 25 MILLIGRAM(S): at 22:24

## 2018-10-14 RX ADMIN — ONDANSETRON 4 MILLIGRAM(S): 8 TABLET, FILM COATED ORAL at 20:21

## 2018-10-14 RX ADMIN — Medication 25 MILLIGRAM(S): at 05:43

## 2018-10-14 RX ADMIN — ONDANSETRON 4 MILLIGRAM(S): 8 TABLET, FILM COATED ORAL at 12:56

## 2018-10-14 RX ADMIN — Medication 25 MILLIGRAM(S): at 13:39

## 2018-10-14 RX ADMIN — HYDROMORPHONE HYDROCHLORIDE 1 MILLIGRAM(S): 2 INJECTION INTRAMUSCULAR; INTRAVENOUS; SUBCUTANEOUS at 18:42

## 2018-10-14 RX ADMIN — SODIUM CHLORIDE 3 MILLILITER(S): 9 INJECTION INTRAMUSCULAR; INTRAVENOUS; SUBCUTANEOUS at 13:06

## 2018-10-14 RX ADMIN — HEPARIN SODIUM 7500 UNIT(S): 5000 INJECTION INTRAVENOUS; SUBCUTANEOUS at 05:43

## 2018-10-14 RX ADMIN — HEPARIN SODIUM 7500 UNIT(S): 5000 INJECTION INTRAVENOUS; SUBCUTANEOUS at 22:24

## 2018-10-14 RX ADMIN — HYDROMORPHONE HYDROCHLORIDE 1 MILLIGRAM(S): 2 INJECTION INTRAMUSCULAR; INTRAVENOUS; SUBCUTANEOUS at 05:43

## 2018-10-14 RX ADMIN — HYDROMORPHONE HYDROCHLORIDE 1 MILLIGRAM(S): 2 INJECTION INTRAMUSCULAR; INTRAVENOUS; SUBCUTANEOUS at 12:57

## 2018-10-14 NOTE — PROGRESS NOTE ADULT - ASSESSMENT
A/P: 58 year old man s/p Ex-Lap, Skyler patch and an open cholecystectomy on the 10/8, c/b ileus which is resolving, and TALA with Creat downtrending .    -Voiding freely, TOV passed  -DVT prophylaxis  -empiric H Pylori tx. Amoxicillin, Clarithromycin, Protonix  - Full liquid diet  -serial abd exams  -pain management  -encourage I/S  - PT/OT  -PM&R  -Trend PTT  -Trend Cr

## 2018-10-14 NOTE — PROGRESS NOTE ADULT - SUBJECTIVE AND OBJECTIVE BOX
INTERVAL HPI/OVERNIGHT EVENTS:    SUBJECTIVE:  No acute events. Tolerated CLD. Full liquids will be trialed this AM.  LON drain DCed yesterday with 2-0 nylon stitch placed to close skin and prevent ascites leakage. No sign of infection along wound or LON site.   Khan DCed yesterday and Pt TOV successfully.   Passing flatus, awaiting BM    MEDICATIONS  (STANDING):  amoxicillin      Capsule 1000 milliGRAM(s) Oral two times a day  chlorhexidine 2% Cloths 1 Application(s) Topical daily  clarithromycin 500 milliGRAM(s) Oral two times a day  diltiazem    Tablet 30 milliGRAM(s) Oral every 6 hours  heparin  Injectable 7500 Unit(s) SubCutaneous every 8 hours  hydrALAZINE 25 milliGRAM(s) Oral every 8 hours  multiple electrolytes Injection Type 1 1000 milliLiter(s) (75 mL/Hr) IV Continuous <Continuous>  pantoprazole    Tablet 40 milliGRAM(s) Oral every 12 hours    MEDICATIONS  (PRN):  bisacodyl Suppository 10 milliGRAM(s) Rectal once PRN Constipation  HYDROmorphone  Injectable 0.5 milliGRAM(s) IV Push every 3 hours PRN Moderate Pain (4 - 6)  HYDROmorphone  Injectable 1 milliGRAM(s) IV Push every 3 hours PRN Severe Pain (7 - 10)  ondansetron Injectable 4 milliGRAM(s) IV Push every 6 hours PRN Nausea and/or Vomiting      Vital Signs Last 24 Hrs  T(C): 36.4 (13 Oct 2018 22:38), Max: 36.4 (13 Oct 2018 05:10)  T(F): 97.5 (13 Oct 2018 22:38), Max: 97.5 (13 Oct 2018 05:10)  HR: 89 (13 Oct 2018 22:38) (77 - 112)  BP: 146/72 (13 Oct 2018 22:38) (112/67 - 146/72)  BP(mean): --  RR: 20 (13 Oct 2018 22:38) (18 - 20)  SpO2: 97% (13 Oct 2018 22:38) (96% - 97%)      PE:  General: NAD  Respiratory: No increased WOB  Cardiac: RRR  GI: obese abdomen, R-sided previous LON drain site has skin suture, abdomen soft, non-distended, non-tender. Midline dressing c/d/i.   :Normal male genitalia  Ext: WWP  Vasc: 2+ pulses x4 extremities        I&O's Detail    12 Oct 2018 07:01  -  13 Oct 2018 07:00  --------------------------------------------------------  IN:    heparin  Infusion.: 72 mL    multiple electrolytes Injection Type 1: 1650 mL  Total IN: 1722 mL    OUT:    Drain: 120 mL    Drain: 3050 mL    Indwelling Catheter - Urethral: 1400 mL    Post-Void Residual per Intermittent Catheterization: 310 mL  Total OUT: 4880 mL    Total NET: -3158 mL      13 Oct 2018 07:01  -  14 Oct 2018 03:34  --------------------------------------------------------  IN:    Oral Fluid: 250 mL  Total IN: 250 mL    OUT:    Indwelling Catheter - Urethral: 1000 mL    Voided: 1100 mL  Total OUT: 2100 mL    Total NET: -1850 mL          LABS:                        11.2   6.2   )-----------( 320      ( 13 Oct 2018 08:27 )             39.4     10-13    138  |  96<L>  |  24.0<H>  ----------------------------<  83  5.3   |  28.0  |  1.42<H>    Ca    8.8      13 Oct 2018 08:27  Phos  4.2     10-13  Mg     2.3     10-13    TPro  5.8<L>  /  Alb  3.0<L>  /  TBili  0.6  /  DBili  x   /  AST  42<H>  /  ALT  71<H>  /  AlkPhos  56  10-13    PTT - ( 12 Oct 2018 20:41 )  PTT:29.1 sec      RADIOLOGY & ADDITIONAL STUDIES:

## 2018-10-15 LAB
ANION GAP SERPL CALC-SCNC: 12 MMOL/L — SIGNIFICANT CHANGE UP (ref 5–17)
BUN SERPL-MCNC: 12 MG/DL — SIGNIFICANT CHANGE UP (ref 8–20)
CALCIUM SERPL-MCNC: 8.3 MG/DL — LOW (ref 8.6–10.2)
CHLORIDE SERPL-SCNC: 96 MMOL/L — LOW (ref 98–107)
CO2 SERPL-SCNC: 27 MMOL/L — SIGNIFICANT CHANGE UP (ref 22–29)
CREAT SERPL-MCNC: 0.99 MG/DL — SIGNIFICANT CHANGE UP (ref 0.5–1.3)
EOSINOPHIL # BLD AUTO: 0.1 K/UL — SIGNIFICANT CHANGE UP (ref 0–0.5)
EOSINOPHIL NFR BLD AUTO: 1.1 % — SIGNIFICANT CHANGE UP (ref 0–5)
GLUCOSE SERPL-MCNC: 83 MG/DL — SIGNIFICANT CHANGE UP (ref 70–115)
HCT VFR BLD CALC: 39.8 % — LOW (ref 42–52)
HGB BLD-MCNC: 11.7 G/DL — LOW (ref 14–18)
LYMPHOCYTES # BLD AUTO: 1.2 K/UL — SIGNIFICANT CHANGE UP (ref 1–4.8)
LYMPHOCYTES # BLD AUTO: 18.5 % — LOW (ref 20–55)
MAGNESIUM SERPL-MCNC: 1.7 MG/DL — SIGNIFICANT CHANGE UP (ref 1.6–2.6)
MCHC RBC-ENTMCNC: 26.8 PG — LOW (ref 27–31)
MCHC RBC-ENTMCNC: 29.4 G/DL — LOW (ref 32–36)
MCV RBC AUTO: 91.1 FL — SIGNIFICANT CHANGE UP (ref 80–94)
MONOCYTES # BLD AUTO: 0.4 K/UL — SIGNIFICANT CHANGE UP (ref 0–0.8)
MONOCYTES NFR BLD AUTO: 7.1 % — SIGNIFICANT CHANGE UP (ref 3–10)
NEUTROPHILS # BLD AUTO: 4.4 K/UL — SIGNIFICANT CHANGE UP (ref 1.8–8)
NEUTROPHILS NFR BLD AUTO: 71 % — SIGNIFICANT CHANGE UP (ref 37–73)
PHOSPHATE SERPL-MCNC: 2.9 MG/DL — SIGNIFICANT CHANGE UP (ref 2.4–4.7)
PLATELET # BLD AUTO: 353 K/UL — SIGNIFICANT CHANGE UP (ref 150–400)
POTASSIUM SERPL-MCNC: 5.1 MMOL/L — SIGNIFICANT CHANGE UP (ref 3.5–5.3)
POTASSIUM SERPL-SCNC: 5.1 MMOL/L — SIGNIFICANT CHANGE UP (ref 3.5–5.3)
RBC # BLD: 4.37 M/UL — LOW (ref 4.6–6.2)
RBC # FLD: 16.6 % — HIGH (ref 11–15.6)
SODIUM SERPL-SCNC: 135 MMOL/L — SIGNIFICANT CHANGE UP (ref 135–145)
WBC # BLD: 6.2 K/UL — SIGNIFICANT CHANGE UP (ref 4.8–10.8)
WBC # FLD AUTO: 6.2 K/UL — SIGNIFICANT CHANGE UP (ref 4.8–10.8)

## 2018-10-15 PROCEDURE — 74018 RADEX ABDOMEN 1 VIEW: CPT | Mod: 26

## 2018-10-15 PROCEDURE — 74176 CT ABD & PELVIS W/O CONTRAST: CPT | Mod: 26

## 2018-10-15 PROCEDURE — 71045 X-RAY EXAM CHEST 1 VIEW: CPT | Mod: 26

## 2018-10-15 RX ORDER — METOPROLOL TARTRATE 50 MG
5 TABLET ORAL EVERY 6 HOURS
Qty: 0 | Refills: 0 | Status: DISCONTINUED | OUTPATIENT
Start: 2018-10-15 | End: 2018-10-15

## 2018-10-15 RX ORDER — METOPROLOL TARTRATE 50 MG
5 TABLET ORAL EVERY 6 HOURS
Qty: 0 | Refills: 0 | Status: DISCONTINUED | OUTPATIENT
Start: 2018-10-15 | End: 2018-10-18

## 2018-10-15 RX ORDER — MAGNESIUM SULFATE 500 MG/ML
2 VIAL (ML) INJECTION ONCE
Qty: 0 | Refills: 0 | Status: COMPLETED | OUTPATIENT
Start: 2018-10-15 | End: 2018-10-15

## 2018-10-15 RX ADMIN — HEPARIN SODIUM 7500 UNIT(S): 5000 INJECTION INTRAVENOUS; SUBCUTANEOUS at 22:30

## 2018-10-15 RX ADMIN — Medication 5 MILLIGRAM(S): at 10:51

## 2018-10-15 RX ADMIN — HYDROMORPHONE HYDROCHLORIDE 1 MILLIGRAM(S): 2 INJECTION INTRAMUSCULAR; INTRAVENOUS; SUBCUTANEOUS at 03:41

## 2018-10-15 RX ADMIN — SODIUM CHLORIDE 75 MILLILITER(S): 9 INJECTION, SOLUTION INTRAVENOUS at 05:24

## 2018-10-15 RX ADMIN — SODIUM CHLORIDE 3 MILLILITER(S): 9 INJECTION INTRAMUSCULAR; INTRAVENOUS; SUBCUTANEOUS at 21:09

## 2018-10-15 RX ADMIN — HEPARIN SODIUM 7500 UNIT(S): 5000 INJECTION INTRAVENOUS; SUBCUTANEOUS at 05:23

## 2018-10-15 RX ADMIN — HYDROMORPHONE HYDROCHLORIDE 1 MILLIGRAM(S): 2 INJECTION INTRAMUSCULAR; INTRAVENOUS; SUBCUTANEOUS at 18:31

## 2018-10-15 RX ADMIN — Medication 1000 MILLIGRAM(S): at 05:24

## 2018-10-15 RX ADMIN — ONDANSETRON 4 MILLIGRAM(S): 8 TABLET, FILM COATED ORAL at 03:43

## 2018-10-15 RX ADMIN — SODIUM CHLORIDE 3 MILLILITER(S): 9 INJECTION INTRAMUSCULAR; INTRAVENOUS; SUBCUTANEOUS at 13:48

## 2018-10-15 RX ADMIN — HYDROMORPHONE HYDROCHLORIDE 1 MILLIGRAM(S): 2 INJECTION INTRAMUSCULAR; INTRAVENOUS; SUBCUTANEOUS at 03:56

## 2018-10-15 RX ADMIN — Medication 50 GRAM(S): at 13:47

## 2018-10-15 RX ADMIN — SODIUM CHLORIDE 75 MILLILITER(S): 9 INJECTION, SOLUTION INTRAVENOUS at 22:30

## 2018-10-15 RX ADMIN — HYDROMORPHONE HYDROCHLORIDE 1 MILLIGRAM(S): 2 INJECTION INTRAMUSCULAR; INTRAVENOUS; SUBCUTANEOUS at 14:31

## 2018-10-15 RX ADMIN — Medication 25 MILLIGRAM(S): at 22:31

## 2018-10-15 RX ADMIN — HEPARIN SODIUM 7500 UNIT(S): 5000 INJECTION INTRAVENOUS; SUBCUTANEOUS at 13:47

## 2018-10-15 RX ADMIN — HYDROMORPHONE HYDROCHLORIDE 1 MILLIGRAM(S): 2 INJECTION INTRAMUSCULAR; INTRAVENOUS; SUBCUTANEOUS at 13:47

## 2018-10-15 RX ADMIN — PANTOPRAZOLE SODIUM 40 MILLIGRAM(S): 20 TABLET, DELAYED RELEASE ORAL at 05:23

## 2018-10-15 RX ADMIN — ONDANSETRON 4 MILLIGRAM(S): 8 TABLET, FILM COATED ORAL at 10:51

## 2018-10-15 RX ADMIN — Medication 500 MILLIGRAM(S): at 05:23

## 2018-10-15 RX ADMIN — Medication 25 MILLIGRAM(S): at 05:25

## 2018-10-15 RX ADMIN — SODIUM CHLORIDE 3 MILLILITER(S): 9 INJECTION INTRAMUSCULAR; INTRAVENOUS; SUBCUTANEOUS at 05:21

## 2018-10-15 RX ADMIN — CHLORHEXIDINE GLUCONATE 1 APPLICATION(S): 213 SOLUTION TOPICAL at 16:10

## 2018-10-15 RX ADMIN — HYDROMORPHONE HYDROCHLORIDE 1 MILLIGRAM(S): 2 INJECTION INTRAMUSCULAR; INTRAVENOUS; SUBCUTANEOUS at 17:29

## 2018-10-15 RX ADMIN — Medication 5 MILLIGRAM(S): at 17:15

## 2018-10-15 NOTE — PROGRESS NOTE ADULT - SUBJECTIVE AND OBJECTIVE BOX
Subjective:  Pt states that he is more distended than previous, no longer has an appetite, and is generally uncomfortable. Denies vomiting.    STATUS POST:  tara patch for duodenal perforation    POST OPERATIVE DAY #: 7    MEDICATIONS  (STANDING):  amoxicillin      Capsule 1000 milliGRAM(s) Oral two times a day  chlorhexidine 2% Cloths 1 Application(s) Topical daily  clarithromycin 500 milliGRAM(s) Oral two times a day  diltiazem    Tablet 30 milliGRAM(s) Oral every 6 hours  heparin  Injectable 7500 Unit(s) SubCutaneous every 8 hours  hydrALAZINE 25 milliGRAM(s) Oral every 8 hours  metoprolol tartrate Injectable 5 milliGRAM(s) IV Push every 6 hours  multiple electrolytes Injection Type 1 1000 milliLiter(s) (75 mL/Hr) IV Continuous <Continuous>  pantoprazole    Tablet 40 milliGRAM(s) Oral every 12 hours  sodium chloride 0.9% lock flush 3 milliLiter(s) IV Push every 8 hours    MEDICATIONS  (PRN):  bisacodyl Suppository 10 milliGRAM(s) Rectal once PRN Constipation  HYDROmorphone  Injectable 0.5 milliGRAM(s) IV Push every 3 hours PRN Moderate Pain (4 - 6)  HYDROmorphone  Injectable 1 milliGRAM(s) IV Push every 3 hours PRN Severe Pain (7 - 10)  ondansetron Injectable 4 milliGRAM(s) IV Push every 6 hours PRN Nausea and/or Vomiting      Vital Signs Last 24 Hrs  T(C): 36.9 (15 Oct 2018 10:44), Max: 36.9 (14 Oct 2018 17:16)  T(F): 98.5 (15 Oct 2018 10:44), Max: 98.5 (15 Oct 2018 10:44)  HR: 100 (15 Oct 2018 10:44) (85 - 110)  BP: 134/78 (15 Oct 2018 10:44) (120/65 - 150/89)  BP(mean): --  RR: 20 (15 Oct 2018 10:44) (20 - 20)  SpO2: 96% (15 Oct 2018 10:44) (96% - 97%)    Physical Exam:    Constitutional: NAD  HEENT: PERRL, EOMI  Neck: No JVD, FROM without pain  Respiratory: Respirations non-labored, no accessory muscle use  Cardiovascular: irregular  Gastrointestinal: Distended, tympanic, wound with healthy granulation tissue at base, no evidence of infection  Extremities: anasarca  Neurological: A&O x 3; without gross deficit  Musculoskeletal: No joint pain, swelling, deformity, or point tenderness; no limitation of movement      LABS:  Pending        58M with possible ileus, may also have intra-abdominal complication as cause for distension.   -Labs were unable to drawn this morning but are pending now  -KUB ordered  -Will consider CT abd/pel today based on lab results (leukocytosis and improvement in TALA)  -NPO  -Possible NGT for symptomatic improvement but not necessary at this time  -IVF while NPO  -Encourage IS  -Encourage ambulation

## 2018-10-16 ENCOUNTER — RESULT REVIEW (OUTPATIENT)
Age: 58
End: 2018-10-16

## 2018-10-16 LAB
ANION GAP SERPL CALC-SCNC: 15 MMOL/L — SIGNIFICANT CHANGE UP (ref 5–17)
B PERT IGG+IGM PNL SER: ABNORMAL
BUN SERPL-MCNC: 10 MG/DL — SIGNIFICANT CHANGE UP (ref 8–20)
CALCIUM SERPL-MCNC: 8.4 MG/DL — LOW (ref 8.6–10.2)
CHLORIDE SERPL-SCNC: 94 MMOL/L — LOW (ref 98–107)
CO2 SERPL-SCNC: 28 MMOL/L — SIGNIFICANT CHANGE UP (ref 22–29)
COLOR FLD: YELLOW
CREAT SERPL-MCNC: 0.9 MG/DL — SIGNIFICANT CHANGE UP (ref 0.5–1.3)
FLUID INTAKE SUBSTANCE CLASS: SIGNIFICANT CHANGE UP
FLUID SEGMENTED GRANULOCYTES: 47 % — SIGNIFICANT CHANGE UP
GLUCOSE SERPL-MCNC: 72 MG/DL — SIGNIFICANT CHANGE UP (ref 70–115)
GRAM STN FLD: SIGNIFICANT CHANGE UP
HCT VFR BLD CALC: 40.9 % — LOW (ref 42–52)
HGB BLD-MCNC: 12.2 G/DL — LOW (ref 14–18)
LYMPHOCYTES # FLD: 6 % — SIGNIFICANT CHANGE UP
MAGNESIUM SERPL-MCNC: 1.8 MG/DL — SIGNIFICANT CHANGE UP (ref 1.6–2.6)
MCHC RBC-ENTMCNC: 26.7 PG — LOW (ref 27–31)
MCHC RBC-ENTMCNC: 29.8 G/DL — LOW (ref 32–36)
MCV RBC AUTO: 89.5 FL — SIGNIFICANT CHANGE UP (ref 80–94)
MESOTHL CELL # FLD: 2 % — SIGNIFICANT CHANGE UP
MONOS+MACROS # FLD: 45 % — SIGNIFICANT CHANGE UP
PHOSPHATE SERPL-MCNC: 2.8 MG/DL — SIGNIFICANT CHANGE UP (ref 2.4–4.7)
PLATELET # BLD AUTO: 399 K/UL — SIGNIFICANT CHANGE UP (ref 150–400)
POTASSIUM SERPL-MCNC: 4.4 MMOL/L — SIGNIFICANT CHANGE UP (ref 3.5–5.3)
POTASSIUM SERPL-SCNC: 4.4 MMOL/L — SIGNIFICANT CHANGE UP (ref 3.5–5.3)
RBC # BLD: 4.57 M/UL — LOW (ref 4.6–6.2)
RBC # FLD: 16.8 % — HIGH (ref 11–15.6)
RCV VOL RI: 3400 /UL — HIGH (ref 0–5)
SODIUM SERPL-SCNC: 137 MMOL/L — SIGNIFICANT CHANGE UP (ref 135–145)
SPECIMEN SOURCE: SIGNIFICANT CHANGE UP
TOTAL NUCLEATED CELL COUNT, BODY FLUID: 550 /UL — HIGH (ref 0–5)
TUBE TYPE: SIGNIFICANT CHANGE UP
WBC # BLD: 5.4 K/UL — SIGNIFICANT CHANGE UP (ref 4.8–10.8)
WBC # FLD AUTO: 5.4 K/UL — SIGNIFICANT CHANGE UP (ref 4.8–10.8)

## 2018-10-16 PROCEDURE — 88305 TISSUE EXAM BY PATHOLOGIST: CPT | Mod: 26

## 2018-10-16 PROCEDURE — 74018 RADEX ABDOMEN 1 VIEW: CPT | Mod: 26

## 2018-10-16 PROCEDURE — 49083 ABD PARACENTESIS W/IMAGING: CPT

## 2018-10-16 PROCEDURE — 88112 CYTOPATH CELL ENHANCE TECH: CPT | Mod: 26

## 2018-10-16 RX ORDER — PANTOPRAZOLE SODIUM 20 MG/1
40 TABLET, DELAYED RELEASE ORAL DAILY
Qty: 0 | Refills: 0 | Status: DISCONTINUED | OUTPATIENT
Start: 2018-10-16 | End: 2018-10-17

## 2018-10-16 RX ORDER — HYDRALAZINE HCL 50 MG
10 TABLET ORAL EVERY 6 HOURS
Qty: 0 | Refills: 0 | Status: DISCONTINUED | OUTPATIENT
Start: 2018-10-16 | End: 2018-10-18

## 2018-10-16 RX ORDER — SODIUM CHLORIDE 9 MG/ML
1000 INJECTION, SOLUTION INTRAVENOUS ONCE
Qty: 0 | Refills: 0 | Status: COMPLETED | OUTPATIENT
Start: 2018-10-16 | End: 2018-10-16

## 2018-10-16 RX ORDER — METRONIDAZOLE 500 MG
500 TABLET ORAL EVERY 12 HOURS
Qty: 0 | Refills: 0 | Status: DISCONTINUED | OUTPATIENT
Start: 2018-10-16 | End: 2018-10-18

## 2018-10-16 RX ORDER — SODIUM CHLORIDE 9 MG/ML
1000 INJECTION INTRAMUSCULAR; INTRAVENOUS; SUBCUTANEOUS
Qty: 0 | Refills: 0 | Status: DISCONTINUED | OUTPATIENT
Start: 2018-10-16 | End: 2018-10-18

## 2018-10-16 RX ORDER — PIPERACILLIN AND TAZOBACTAM 4; .5 G/20ML; G/20ML
3.38 INJECTION, POWDER, LYOPHILIZED, FOR SOLUTION INTRAVENOUS EVERY 8 HOURS
Qty: 0 | Refills: 0 | Status: DISCONTINUED | OUTPATIENT
Start: 2018-10-16 | End: 2018-10-18

## 2018-10-16 RX ADMIN — HYDROMORPHONE HYDROCHLORIDE 0.5 MILLIGRAM(S): 2 INJECTION INTRAMUSCULAR; INTRAVENOUS; SUBCUTANEOUS at 15:08

## 2018-10-16 RX ADMIN — Medication 25 MILLIGRAM(S): at 05:48

## 2018-10-16 RX ADMIN — HYDROMORPHONE HYDROCHLORIDE 1 MILLIGRAM(S): 2 INJECTION INTRAMUSCULAR; INTRAVENOUS; SUBCUTANEOUS at 00:44

## 2018-10-16 RX ADMIN — Medication 5 MILLIGRAM(S): at 00:19

## 2018-10-16 RX ADMIN — PIPERACILLIN AND TAZOBACTAM 25 GRAM(S): 4; .5 INJECTION, POWDER, LYOPHILIZED, FOR SOLUTION INTRAVENOUS at 14:34

## 2018-10-16 RX ADMIN — Medication 5 MILLIGRAM(S): at 23:23

## 2018-10-16 RX ADMIN — SODIUM CHLORIDE 3 MILLILITER(S): 9 INJECTION INTRAMUSCULAR; INTRAVENOUS; SUBCUTANEOUS at 21:29

## 2018-10-16 RX ADMIN — Medication 5 MILLIGRAM(S): at 05:48

## 2018-10-16 RX ADMIN — Medication 10 MILLIGRAM(S): at 17:49

## 2018-10-16 RX ADMIN — Medication 10 MILLIGRAM(S): at 14:29

## 2018-10-16 RX ADMIN — HEPARIN SODIUM 7500 UNIT(S): 5000 INJECTION INTRAVENOUS; SUBCUTANEOUS at 05:47

## 2018-10-16 RX ADMIN — PANTOPRAZOLE SODIUM 40 MILLIGRAM(S): 20 TABLET, DELAYED RELEASE ORAL at 05:48

## 2018-10-16 RX ADMIN — SODIUM CHLORIDE 3 MILLILITER(S): 9 INJECTION INTRAMUSCULAR; INTRAVENOUS; SUBCUTANEOUS at 08:08

## 2018-10-16 RX ADMIN — CHLORHEXIDINE GLUCONATE 1 APPLICATION(S): 213 SOLUTION TOPICAL at 14:28

## 2018-10-16 RX ADMIN — ONDANSETRON 4 MILLIGRAM(S): 8 TABLET, FILM COATED ORAL at 00:30

## 2018-10-16 RX ADMIN — Medication 500 MILLIGRAM(S): at 05:47

## 2018-10-16 RX ADMIN — SODIUM CHLORIDE 75 MILLILITER(S): 9 INJECTION INTRAMUSCULAR; INTRAVENOUS; SUBCUTANEOUS at 17:49

## 2018-10-16 RX ADMIN — HYDROMORPHONE HYDROCHLORIDE 0.5 MILLIGRAM(S): 2 INJECTION INTRAMUSCULAR; INTRAVENOUS; SUBCUTANEOUS at 14:26

## 2018-10-16 RX ADMIN — Medication 1000 MILLIGRAM(S): at 05:48

## 2018-10-16 RX ADMIN — Medication 100 MILLIGRAM(S): at 19:31

## 2018-10-16 RX ADMIN — PANTOPRAZOLE SODIUM 40 MILLIGRAM(S): 20 TABLET, DELAYED RELEASE ORAL at 14:28

## 2018-10-16 RX ADMIN — HEPARIN SODIUM 7500 UNIT(S): 5000 INJECTION INTRAVENOUS; SUBCUTANEOUS at 21:29

## 2018-10-16 RX ADMIN — Medication 5 MILLIGRAM(S): at 14:28

## 2018-10-16 RX ADMIN — HEPARIN SODIUM 7500 UNIT(S): 5000 INJECTION INTRAVENOUS; SUBCUTANEOUS at 14:33

## 2018-10-16 RX ADMIN — PIPERACILLIN AND TAZOBACTAM 25 GRAM(S): 4; .5 INJECTION, POWDER, LYOPHILIZED, FOR SOLUTION INTRAVENOUS at 21:29

## 2018-10-16 RX ADMIN — SODIUM CHLORIDE 1000 MILLILITER(S): 9 INJECTION, SOLUTION INTRAVENOUS at 14:56

## 2018-10-16 RX ADMIN — Medication 5 MILLIGRAM(S): at 17:48

## 2018-10-16 RX ADMIN — HYDROMORPHONE HYDROCHLORIDE 1 MILLIGRAM(S): 2 INJECTION INTRAMUSCULAR; INTRAVENOUS; SUBCUTANEOUS at 00:28

## 2018-10-16 RX ADMIN — SODIUM CHLORIDE 3 MILLILITER(S): 9 INJECTION INTRAMUSCULAR; INTRAVENOUS; SUBCUTANEOUS at 14:34

## 2018-10-16 RX ADMIN — Medication 10 MILLIGRAM(S): at 23:23

## 2018-10-16 NOTE — PROGRESS NOTE ADULT - ASSESSMENT
58M with possible ileus, may also have intra-abdominal complication as cause for distension.     Plan:  -f/u AM KUB after oral contrast  -CT a/p reveals possible sbo and ascitic fluid  -NPO/NGT/IVF  -H Pylor treatment with amoxi and clarithro until 11/25  -Encourage IS/ambulation  -consider fluid tap studies

## 2018-10-16 NOTE — PROGRESS NOTE ADULT - SUBJECTIVE AND OBJECTIVE BOX
INTERVAL HPI/OVERNIGHT EVENTS:    SUBJECTIVE:  No acute overnight events.  Pt is reporting improvement in abdominal pain and discomfort with NGT, but does complain of throat pain.  No n/v/fevers/chills/sob/cp.  Pt still c/o of distension.  Incision site of LON drain is still draining, although not as much as before as per patient.      MEDICATIONS  (STANDING):  amoxicillin      Capsule 1000 milliGRAM(s) Oral two times a day  chlorhexidine 2% Cloths 1 Application(s) Topical daily  clarithromycin 500 milliGRAM(s) Oral two times a day  diltiazem    Tablet 30 milliGRAM(s) Oral every 6 hours  heparin  Injectable 7500 Unit(s) SubCutaneous every 8 hours  hydrALAZINE 25 milliGRAM(s) Oral every 8 hours  metoprolol tartrate Injectable 5 milliGRAM(s) IV Push every 6 hours  multiple electrolytes Injection Type 1 1000 milliLiter(s) (75 mL/Hr) IV Continuous <Continuous>  pantoprazole    Tablet 40 milliGRAM(s) Oral every 12 hours  sodium chloride 0.9% lock flush 3 milliLiter(s) IV Push every 8 hours    MEDICATIONS  (PRN):  bisacodyl Suppository 10 milliGRAM(s) Rectal once PRN Constipation  HYDROmorphone  Injectable 0.5 milliGRAM(s) IV Push every 3 hours PRN Moderate Pain (4 - 6)  HYDROmorphone  Injectable 1 milliGRAM(s) IV Push every 3 hours PRN Severe Pain (7 - 10)  ondansetron Injectable 4 milliGRAM(s) IV Push every 6 hours PRN Nausea and/or Vomiting      Vital Signs Last 24 Hrs  T(C): 36.6 (16 Oct 2018 05:04), Max: 36.9 (15 Oct 2018 10:44)  T(F): 97.8 (16 Oct 2018 05:04), Max: 98.5 (15 Oct 2018 10:44)  HR: 114 (16 Oct 2018 05:04) (77 - 114)  BP: 125/81 (16 Oct 2018 05:04) (112/61 - 136/85)  BP(mean): --  RR: 18 (16 Oct 2018 05:04) (18 - 20)  SpO2: 96% (15 Oct 2018 21:00) (96% - 96%)    PE  Gen:  NAD  Pulm: CTAB/L  CV: RRR  Abd: hard, distended, nontender, tympanic to percussion, incisions c/d/i  Ext:  +2 nonpitting edema; no cyanosis or clubbing  Neuro:  AAOx3, no sensory or motor deficits      I&O's Detail    15 Oct 2018 07:01  -  16 Oct 2018 07:00  --------------------------------------------------------  IN:    multiple electrolytes Injection Type 1: 1725 mL  Total IN: 1725 mL    OUT:    Nasoenteral Tube: 1900 mL    Voided: 880 mL  Total OUT: 2780 mL    Total NET: -1055 mL          LABS:                        11.7   6.2   )-----------( 353      ( 15 Oct 2018 14:29 )             39.8     10-15    135  |  96<L>  |  12.0  ----------------------------<  83  5.1   |  27.0  |  0.99    Ca    8.3<L>      15 Oct 2018 10:34  Phos  2.9     10-15  Mg     1.7     10-15            RADIOLOGY & ADDITIONAL STUDIES:    CXR post NGT:    The nasogastric tube is difficult to follow beyond the distal esophagus.    Right effusion with bibasilar atelectasis        CT scan a/p w OC:     Small bowel obstruction with transition to collapsed loops in the left   anterior mid abdomen subjacent to the abdominal wall. Oral contrast   material has not yet reached the point of transition.    Status post duodenal ulcer repair with a few small droplets of residual   free air adjacent to the duodenal bulb, however no evidence of   extraluminal contrast or distant free air. No drainable collections.

## 2018-10-16 NOTE — PROCEDURE NOTE - NSSITEPREP_SKIN_A_CORE
chlorhexidine/Adherence to aseptic technique: hand hygiene prior to donning barriers (gown, gloves), don cap and mask, sterile drape over patient
chlorhexidine
chlorhexidine
chlorhexidine/Adherence to aseptic technique: hand hygiene prior to donning barriers (gown, gloves), don cap and mask, sterile drape over patient

## 2018-10-16 NOTE — PROCEDURE NOTE - NSINDICATIONS_GEN_A_CORE
ascites
arterial puncture to obtain ABG's/monitoring purposes/critical patient
critical illness/volume resuscitation
volume resuscitation/emergency venous access/hemodynamic monitoring/critical illness/venous access
pre-op

## 2018-10-16 NOTE — PROCEDURE NOTE - NSINFORMCONSENT_GEN_A_CORE
Benefits, risks, and possible complications of procedure explained to patient/caregiver who verbalized understanding and gave written consent.
This was an emergent procedure.

## 2018-10-17 LAB
ALBUMIN FLD-MCNC: 1.5 G/DL — SIGNIFICANT CHANGE UP
ANION GAP SERPL CALC-SCNC: 15 MMOL/L — SIGNIFICANT CHANGE UP (ref 5–17)
BUN SERPL-MCNC: 7 MG/DL — LOW (ref 8–20)
CALCIUM SERPL-MCNC: 8.3 MG/DL — LOW (ref 8.6–10.2)
CHLORIDE SERPL-SCNC: 96 MMOL/L — LOW (ref 98–107)
CO2 SERPL-SCNC: 27 MMOL/L — SIGNIFICANT CHANGE UP (ref 22–29)
CREAT SERPL-MCNC: 0.99 MG/DL — SIGNIFICANT CHANGE UP (ref 0.5–1.3)
GLUCOSE FLD-MCNC: 75 MG/DL — SIGNIFICANT CHANGE UP
GLUCOSE SERPL-MCNC: 66 MG/DL — LOW (ref 70–115)
HCT VFR BLD CALC: 37.8 % — LOW (ref 42–52)
HGB BLD-MCNC: 11.2 G/DL — LOW (ref 14–18)
LDH SERPL L TO P-CCNC: 104 U/L — SIGNIFICANT CHANGE UP
MAGNESIUM SERPL-MCNC: 1.5 MG/DL — LOW (ref 1.6–2.6)
MCHC RBC-ENTMCNC: 26.7 PG — LOW (ref 27–31)
MCHC RBC-ENTMCNC: 29.6 G/DL — LOW (ref 32–36)
MCV RBC AUTO: 90 FL — SIGNIFICANT CHANGE UP (ref 80–94)
NIGHT BLUE STAIN TISS: SIGNIFICANT CHANGE UP
PHOSPHATE SERPL-MCNC: 3 MG/DL — SIGNIFICANT CHANGE UP (ref 2.4–4.7)
PLATELET # BLD AUTO: 356 K/UL — SIGNIFICANT CHANGE UP (ref 150–400)
POTASSIUM SERPL-MCNC: 4.3 MMOL/L — SIGNIFICANT CHANGE UP (ref 3.5–5.3)
POTASSIUM SERPL-SCNC: 4.3 MMOL/L — SIGNIFICANT CHANGE UP (ref 3.5–5.3)
PROT FLD-MCNC: 2.7 G/DL — SIGNIFICANT CHANGE UP
RBC # BLD: 4.2 M/UL — LOW (ref 4.6–6.2)
RBC # FLD: 16.8 % — HIGH (ref 11–15.6)
SODIUM SERPL-SCNC: 138 MMOL/L — SIGNIFICANT CHANGE UP (ref 135–145)
SPECIMEN SOURCE: SIGNIFICANT CHANGE UP
TSH SERPL-MCNC: 21.95 UIU/ML — HIGH (ref 0.27–4.2)
WBC # BLD: 3.9 K/UL — LOW (ref 4.8–10.8)
WBC # FLD AUTO: 3.9 K/UL — LOW (ref 4.8–10.8)

## 2018-10-17 PROCEDURE — 99223 1ST HOSP IP/OBS HIGH 75: CPT

## 2018-10-17 RX ORDER — PANTOPRAZOLE SODIUM 20 MG/1
40 TABLET, DELAYED RELEASE ORAL
Qty: 0 | Refills: 0 | Status: DISCONTINUED | OUTPATIENT
Start: 2018-10-17 | End: 2018-10-18

## 2018-10-17 RX ORDER — HYDROMORPHONE HYDROCHLORIDE 2 MG/ML
0.5 INJECTION INTRAMUSCULAR; INTRAVENOUS; SUBCUTANEOUS
Qty: 0 | Refills: 0 | Status: DISCONTINUED | OUTPATIENT
Start: 2018-10-17 | End: 2018-10-18

## 2018-10-17 RX ORDER — MAGNESIUM SULFATE 500 MG/ML
2 VIAL (ML) INJECTION ONCE
Qty: 0 | Refills: 0 | Status: COMPLETED | OUTPATIENT
Start: 2018-10-17 | End: 2018-10-17

## 2018-10-17 RX ORDER — LEVOTHYROXINE SODIUM 125 MCG
50 TABLET ORAL DAILY
Qty: 0 | Refills: 0 | Status: DISCONTINUED | OUTPATIENT
Start: 2018-10-17 | End: 2018-10-24

## 2018-10-17 RX ORDER — HYDROMORPHONE HYDROCHLORIDE 2 MG/ML
1 INJECTION INTRAMUSCULAR; INTRAVENOUS; SUBCUTANEOUS
Qty: 0 | Refills: 0 | Status: DISCONTINUED | OUTPATIENT
Start: 2018-10-17 | End: 2018-10-18

## 2018-10-17 RX ADMIN — HYDROMORPHONE HYDROCHLORIDE 1 MILLIGRAM(S): 2 INJECTION INTRAMUSCULAR; INTRAVENOUS; SUBCUTANEOUS at 23:12

## 2018-10-17 RX ADMIN — PANTOPRAZOLE SODIUM 40 MILLIGRAM(S): 20 TABLET, DELAYED RELEASE ORAL at 05:14

## 2018-10-17 RX ADMIN — Medication 10 MILLIGRAM(S): at 11:13

## 2018-10-17 RX ADMIN — Medication 100 MILLIGRAM(S): at 17:49

## 2018-10-17 RX ADMIN — Medication 10 MILLIGRAM(S): at 17:49

## 2018-10-17 RX ADMIN — Medication 100 MILLIGRAM(S): at 05:14

## 2018-10-17 RX ADMIN — Medication 10 MILLIGRAM(S): at 23:17

## 2018-10-17 RX ADMIN — PIPERACILLIN AND TAZOBACTAM 25 GRAM(S): 4; .5 INJECTION, POWDER, LYOPHILIZED, FOR SOLUTION INTRAVENOUS at 13:57

## 2018-10-17 RX ADMIN — Medication 5 MILLIGRAM(S): at 17:49

## 2018-10-17 RX ADMIN — Medication 10 MILLIGRAM(S): at 05:14

## 2018-10-17 RX ADMIN — HEPARIN SODIUM 7500 UNIT(S): 5000 INJECTION INTRAVENOUS; SUBCUTANEOUS at 05:13

## 2018-10-17 RX ADMIN — Medication 5 MILLIGRAM(S): at 05:14

## 2018-10-17 RX ADMIN — Medication 5 MILLIGRAM(S): at 11:13

## 2018-10-17 RX ADMIN — SODIUM CHLORIDE 3 MILLILITER(S): 9 INJECTION INTRAMUSCULAR; INTRAVENOUS; SUBCUTANEOUS at 13:05

## 2018-10-17 RX ADMIN — HEPARIN SODIUM 7500 UNIT(S): 5000 INJECTION INTRAVENOUS; SUBCUTANEOUS at 22:38

## 2018-10-17 RX ADMIN — SODIUM CHLORIDE 3 MILLILITER(S): 9 INJECTION INTRAMUSCULAR; INTRAVENOUS; SUBCUTANEOUS at 05:04

## 2018-10-17 RX ADMIN — PANTOPRAZOLE SODIUM 40 MILLIGRAM(S): 20 TABLET, DELAYED RELEASE ORAL at 17:49

## 2018-10-17 RX ADMIN — Medication 5 MILLIGRAM(S): at 23:17

## 2018-10-17 RX ADMIN — HYDROMORPHONE HYDROCHLORIDE 1 MILLIGRAM(S): 2 INJECTION INTRAMUSCULAR; INTRAVENOUS; SUBCUTANEOUS at 22:35

## 2018-10-17 RX ADMIN — HYDROMORPHONE HYDROCHLORIDE 1 MILLIGRAM(S): 2 INJECTION INTRAMUSCULAR; INTRAVENOUS; SUBCUTANEOUS at 03:19

## 2018-10-17 RX ADMIN — PIPERACILLIN AND TAZOBACTAM 25 GRAM(S): 4; .5 INJECTION, POWDER, LYOPHILIZED, FOR SOLUTION INTRAVENOUS at 22:35

## 2018-10-17 RX ADMIN — PIPERACILLIN AND TAZOBACTAM 25 GRAM(S): 4; .5 INJECTION, POWDER, LYOPHILIZED, FOR SOLUTION INTRAVENOUS at 05:13

## 2018-10-17 RX ADMIN — HEPARIN SODIUM 7500 UNIT(S): 5000 INJECTION INTRAVENOUS; SUBCUTANEOUS at 13:58

## 2018-10-17 RX ADMIN — HYDROMORPHONE HYDROCHLORIDE 1 MILLIGRAM(S): 2 INJECTION INTRAMUSCULAR; INTRAVENOUS; SUBCUTANEOUS at 02:01

## 2018-10-17 RX ADMIN — CHLORHEXIDINE GLUCONATE 1 APPLICATION(S): 213 SOLUTION TOPICAL at 11:25

## 2018-10-17 RX ADMIN — Medication 50 GRAM(S): at 11:13

## 2018-10-17 RX ADMIN — SODIUM CHLORIDE 75 MILLILITER(S): 9 INJECTION INTRAMUSCULAR; INTRAVENOUS; SUBCUTANEOUS at 17:59

## 2018-10-17 RX ADMIN — SODIUM CHLORIDE 3 MILLILITER(S): 9 INJECTION INTRAMUSCULAR; INTRAVENOUS; SUBCUTANEOUS at 22:11

## 2018-10-17 NOTE — PROGRESS NOTE ADULT - ASSESSMENT
58M with possible ileus, may also have intra-abdominal complication as cause for distension. NGT output recorded: 800 cc. s/p paracentesis by IR for possible tara patch leak.     Plan:  -NPO/NGT/IVF  -f/u fluid information sent out for paracentesis by IR  -> RBC: 2400, cloudy appearance, fluid: peritoneal  -H Pylor treatment with amoxi and clarithro until 11/25  -Encourage IS/ambulation  -dvt ppx: heparin

## 2018-10-17 NOTE — CONSULT NOTE ADULT - SUBJECTIVE AND OBJECTIVE BOX
HPI:  57 y/o M with no medical care and unknown medical problems presents to ED with dyspnea and vague abdominal pain. Patient states for the past week he has noticed worsening exertional dyspnea, and now has dyspnea at rest. No reported chest pain. Patient also states that about 3 days ago he started noticing generalized abdominal pain that shifted to the left abdomen today. Pain is described as dull ache, that is constant in nature. No provoking or alleviating factors. Associated nausea but no episodes of vomiting. No fever or chills. Passing flatus normally.      PMH: none reported  PSH: none  Medications: none  Allergies: NKDA, NKA  Social: former smoker (3-4 cigarettes over the weekend), quit 2 months ago. Drinks wine over the weekend, quit drinking beer 6 months ago. No illicit drug use. (08 Oct 2018 19:28)    PAST MEDICAL & SURGICAL HISTORY:  No pertinent past medical history  No significant past surgical history    FAMILY HISTORY:  No pertinent family history in first degree relatives      SOCIAL HISTORY:    REVIEW OF SYSTEMS:  Constitutional: No fever, no chills  Eyes: No eye swelling,no  blurry vision, no redness, no loss of vision.  Neck: No neck pain, no change in voice.  Lungs: No shortness of breath, no wheezing, no cough  CV: No chest pain, no palpitations, no pain with walking.  GI: No nausea, no vomiting, no constipation, no diarrhea  : No urinary frequency, no blood in urine, no urinary burning  Musculoskeletal: No muscle pain, no joint pain  Skin: No rash  Neurologic: No headaches, no weakness  Endocrine: No heat intolerance, no cold intolerance  Psych: No depression, no anxiety    MEDICATIONS  (STANDING):  chlorhexidine 2% Cloths 1 Application(s) Topical daily  heparin  Injectable 7500 Unit(s) SubCutaneous every 8 hours  hydrALAZINE Injectable 10 milliGRAM(s) IV Push every 6 hours  metoprolol tartrate Injectable 5 milliGRAM(s) IV Push every 6 hours  metroNIDAZOLE  IVPB 500 milliGRAM(s) IV Intermittent every 12 hours  pantoprazole  Injectable 40 milliGRAM(s) IV Push two times a day  piperacillin/tazobactam IVPB. 3.375 Gram(s) IV Intermittent every 8 hours  sodium chloride 0.9% lock flush 3 milliLiter(s) IV Push every 8 hours  sodium chloride 0.9%. 1000 milliLiter(s) (75 mL/Hr) IV Continuous <Continuous>    MEDICATIONS  (PRN):  bisacodyl Suppository 10 milliGRAM(s) Rectal once PRN Constipation  ondansetron Injectable 4 milliGRAM(s) IV Push every 6 hours PRN Nausea and/or Vomiting    Allergies  No Known Allergies    PHYSICAL EXAM:    Vital Signs Last 24 Hrs  T(C): 37 (17 Oct 2018 09:35), Max: 37 (17 Oct 2018 09:35)  T(F): 98.6 (17 Oct 2018 09:35), Max: 98.6 (17 Oct 2018 09:35)  HR: 131 (17 Oct 2018 11:13) (103 - 131)  BP: 122/60 (17 Oct 2018 11:13) (122/60 - 150/86)  BP(mean): --  RR: 18 (17 Oct 2018 09:35) (18 - 20)  SpO2: 92% (17 Oct 2018 09:35) (92% - 92%)    General appearance: Well developed, well nourished.  Eyes: Pupils equal and reactive to light. EOM full. No exophthalmos.  Neck: Trachea midline. No thyroid enlargement.  Lungs: Normal respiratory excursion. Lungs clear.  CV: Regular cardiac rhythm. No murmur. Carotid and pedal pulses intact.  Abdomen: Soft, non tender, no organomegaly or mass.  Musculoskeletal: No cyanosis, clubbing, or edema. No pedal lesions.  Skin: Warm and moist. No rash.  Neuro: Normal motor and sensory function. DTR's normal.  Psych: Normal affect, good judgement.    LABS:                        11.2   3.9   )-----------( 356      ( 17 Oct 2018 08:38 )             37.8     10-17    138  |  96<L>  |  7.0<L>  ----------------------------<  66<L>  4.3   |  27.0  |  0.99    Ca    8.3<L>      17 Oct 2018 08:38  Phos  3.0     10-17  Mg     1.5     10-17 HPI:  59 y/o M with no medical care and unknown medical problems presents to ED with dyspnea and vague abdominal pain. Patient states for the past week he has noticed worsening exertional dyspnea, and now has dyspnea at rest. No reported chest pain. Patient also states that about 3 days ago he started noticing generalized abdominal pain that shifted to the left abdomen today. Pain is described as dull ache, that is constant in nature. No provoking or alleviating factors. Associated nausea but no episodes of vomiting. No fever or chills. Passing flatus normally.  Incidentally, during blood work he was found to have high TSH 21. He was not aware of any thyroid disease. No family  h/o thyroid disease.     PMH: none reported  PSH: none  Medications: none  Allergies: NKDA, NKA  Social: former smoker (3-4 cigarettes over the weekend), quit 2 months ago. Drinks wine over the weekend, quit drinking beer 6 months ago. No illicit drug use. (08 Oct 2018 19:28)    PAST MEDICAL & SURGICAL HISTORY:  No pertinent past medical history  No significant past surgical history    FAMILY HISTORY:  No pertinent family history in first degree relatives    SOCIAL HISTORY: no smoking now, quit 7 mos ago, no etoh, no drugs     REVIEW OF SYSTEMS:  Constitutional: No fever, no chills  Eyes: No eye swelling,no  blurry vision, no redness, no loss of vision.  Neck: No neck pain, no change in voice.  Lungs: No shortness of breath, no wheezing, no cough  CV: No chest pain, no palpitations, no pain with walking.  GI: No nausea, no vomiting, no constipation, no diarrhea.Has abdominal pain.  : No urinary frequency, no blood in urine, no urinary burning  Musculoskeletal: No muscle pain, no joint pain  Skin: No rash  Neurologic: No headaches, no weakness  Endocrine: No heat intolerance, no cold intolerance  Psych: No depression, no anxiety    MEDICATIONS  (STANDING):  chlorhexidine 2% Cloths 1 Application(s) Topical daily  heparin  Injectable 7500 Unit(s) SubCutaneous every 8 hours  hydrALAZINE Injectable 10 milliGRAM(s) IV Push every 6 hours  metoprolol tartrate Injectable 5 milliGRAM(s) IV Push every 6 hours  metroNIDAZOLE  IVPB 500 milliGRAM(s) IV Intermittent every 12 hours  pantoprazole  Injectable 40 milliGRAM(s) IV Push two times a day  piperacillin/tazobactam IVPB. 3.375 Gram(s) IV Intermittent every 8 hours  sodium chloride 0.9% lock flush 3 milliLiter(s) IV Push every 8 hours  sodium chloride 0.9%. 1000 milliLiter(s) (75 mL/Hr) IV Continuous <Continuous>    MEDICATIONS  (PRN):  bisacodyl Suppository 10 milliGRAM(s) Rectal once PRN Constipation  ondansetron Injectable 4 milliGRAM(s) IV Push every 6 hours PRN Nausea and/or Vomiting    Allergies  No Known Allergies    PHYSICAL EXAM:    Vital Signs Last 24 Hrs  T(C): 37 (17 Oct 2018 09:35), Max: 37 (17 Oct 2018 09:35)  T(F): 98.6 (17 Oct 2018 09:35), Max: 98.6 (17 Oct 2018 09:35)  HR: 131 (17 Oct 2018 11:13) (103 - 131)  BP: 122/60 (17 Oct 2018 11:13) (122/60 - 150/86)  BP(mean): --  RR: 18 (17 Oct 2018 09:35) (18 - 20)  SpO2: 92% (17 Oct 2018 09:35) (92% - 92%)    General appearance: Well developed, well nourished.  Eyes: Pupils equal and reactive to light. EOM full. No exophthalmos.  Neck: Trachea midline. No thyroid enlargement.  Lungs: Normal respiratory excursion. Lungs clear.  CV: Regular cardiac rhythm. No murmur. Carotid and pedal pulses intact.  Abdomen: Soft, non tender, no organomegaly or mass.  Musculoskeletal: No cyanosis, clubbing, or edema. No pedal lesions.  Skin: Warm and moist. stasis dermatitis with scaly skin changes   Neuro: Normal motor and sensory function. DTR's normal.  Psych: Normal affect, good judgement.    LABS:                        11.2   3.9   )-----------( 356      ( 17 Oct 2018 08:38 )             37.8     10-17    138  |  96<L>  |  7.0<L>  ----------------------------<  66<L>  4.3   |  27.0  |  0.99    Ca    8.3<L>      17 Oct 2018 08:38  Phos  3.0     10-17  Mg     1.5     10-17

## 2018-10-17 NOTE — CONSULT NOTE ADULT - ASSESSMENT
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  start Lt4 50 mcg qd   recheck TFts in 1 week to see trend.   pt educated about administration of Lt4 early am empty stomach , no MVi or calcium for 1 hr

## 2018-10-17 NOTE — CHART NOTE - NSCHARTNOTEFT_GEN_A_CORE
Source: Patient [x ]  Family [ ]   other [ x] EMR    Current Diet: NPO    Current Weight:   10/17 - 298#  10/8 - 324#    % Weight Change   Noted 26# wt loss during admission secondary to no solid food consumption during admission since 10/8    Pertinent Medications: MEDICATIONS  (STANDING):  chlorhexidine 2% Cloths 1 Application(s) Topical daily  heparin  Injectable 7500 Unit(s) SubCutaneous every 8 hours  hydrALAZINE Injectable 10 milliGRAM(s) IV Push every 6 hours  magnesium sulfate  IVPB 2 Gram(s) IV Intermittent once  metoprolol tartrate Injectable 5 milliGRAM(s) IV Push every 6 hours  metroNIDAZOLE  IVPB 500 milliGRAM(s) IV Intermittent every 12 hours  pantoprazole  Injectable 40 milliGRAM(s) IV Push two times a day  piperacillin/tazobactam IVPB. 3.375 Gram(s) IV Intermittent every 8 hours  sodium chloride 0.9% lock flush 3 milliLiter(s) IV Push every 8 hours  sodium chloride 0.9%. 1000 milliLiter(s) (75 mL/Hr) IV Continuous <Continuous>    MEDICATIONS  (PRN):  bisacodyl Suppository 10 milliGRAM(s) Rectal once PRN Constipation  ondansetron Injectable 4 milliGRAM(s) IV Push every 6 hours PRN Nausea and/or Vomiting    Pertinent Labs: CBC Full  -  ( 17 Oct 2018 08:38 )  WBC Count : 3.9 K/uL  Hemoglobin : 11.2 g/dL  Hematocrit : 37.8 %  Platelet Count - Automated : 356 K/uL  Mean Cell Volume : 90.0 fl  Mean Cell Hemoglobin : 26.7 pg  Mean Cell Hemoglobin Concentration : 29.6 g/dL    10-17 Na138 mmol/L Glu 66 mg/dL<L> K+ 4.3 mmol/L Cr  0.99 mg/dL BUN 7.0 mg/dL<L> Phos 3.0 mg/dL Alb n/a   PAB n/a       Skin: surgical incision- midline ex-lap, R Flank/LON drain. w/ 2+ non-pitting edema    Nutrition focused physical exam NOT conducted - found signs of malnutrition [ ]absent [ ]present    Subcutaneous fat loss: [ ] Orbital fat pads region, [ ]Buccal fat region, [ ]Triceps region,  [ ]Ribs region    Muscle wasting: [ ]Temples region, [ ]Clavicle region, [ ]Shoulder region, [ ]Scapula region, [ ]Interosseous region,  [ ]thigh region, [ ]Calf region    Estimated Needs:   [x ] no change since previous assessment  [ ] recalculated:     Current Nutrition Diagnosis: Pt meets criteria for Severe acute protein calorie malnutrition related to inadequate protein energy intake in setting of perforated duodenal ulcer s/p ex-lap w/ Skyler patch repair and  open cholecystectomy, paracentesis and possible ileus as evidenced by 8% (26#) wt loss in 1.5 weeks, +2 nonpitting edema, and estimated intake <25% estimated energy needs for 1.5 weeks secondary to NPO/clear liquid status. Pt admitted for abdominal pain and SOB secondary to perforated duodenal ulcer. During initial assessment Pt stated his # and recently has been 275#, admission wt however is 324#. Stated he gave up drinking beer and smoking 6 months ago and he needed a "vice".    Recommendations:   1. Initiate clear liquid diet as tolerated/medically feasible and advance as tolerated to regular consistency low-residue, DASH/TLC   2. May need supplementation as diet is advanced    Monitoring and Evaluation:   [x ] PO intake [ x] Tolerance to diet prescription [X] Weights  [X] Follow up per protocol [X] Labs:

## 2018-10-17 NOTE — PROGRESS NOTE ADULT - SUBJECTIVE AND OBJECTIVE BOX
HPI/OVERNIGHT EVENTS:  No acute events overnight. Afebrile. VSS, except with HR in 107 yesterday in the evening. Otherwise, pt denies f/c/n/v/cp/sob. NGT still in place with 800 output recorded.    MEDICATIONS  (STANDING):  chlorhexidine 2% Cloths 1 Application(s) Topical daily  heparin  Injectable 7500 Unit(s) SubCutaneous every 8 hours  hydrALAZINE Injectable 10 milliGRAM(s) IV Push every 6 hours  metoprolol tartrate Injectable 5 milliGRAM(s) IV Push every 6 hours  metroNIDAZOLE  IVPB 500 milliGRAM(s) IV Intermittent every 12 hours  pantoprazole  Injectable 40 milliGRAM(s) IV Push two times a day  piperacillin/tazobactam IVPB. 3.375 Gram(s) IV Intermittent every 8 hours  sodium chloride 0.9% lock flush 3 milliLiter(s) IV Push every 8 hours  sodium chloride 0.9%. 1000 milliLiter(s) (75 mL/Hr) IV Continuous <Continuous>    MEDICATIONS  (PRN):  bisacodyl Suppository 10 milliGRAM(s) Rectal once PRN Constipation  ondansetron Injectable 4 milliGRAM(s) IV Push every 6 hours PRN Nausea and/or Vomiting      Vital Signs Last 24 Hrs  T(C): 36.5 (16 Oct 2018 17:54), Max: 36.6 (16 Oct 2018 05:04)  T(F): 97.7 (16 Oct 2018 17:54), Max: 97.8 (16 Oct 2018 05:04)  HR: 107 (16 Oct 2018 17:54) (91 - 114)  BP: 150/86 (16 Oct 2018 17:54) (118/75 - 150/86)  BP(mean): --  RR: 18 (16 Oct 2018 12:09) (18 - 18)  SpO2: 93% (16 Oct 2018 12:09) (93% - 93%)    Gen:  NAD  Pulm: CTAB/L  CV: RRR  Abd: hard, distended, nontender, tympanic to percussion, incisions c/d/i  Ext:  +2 nonpitting edema; no cyanosis or clubbing  Neuro:  AAOx3, no sensory or motor deficits      I&O's Detail    15 Oct 2018 07:01  -  16 Oct 2018 07:00  --------------------------------------------------------  IN:    multiple electrolytes Injection Type 1multiple electrolytes Injection Type 1: 1875 mL  Total IN: 1875 mL    OUT:    Nasoenteral Tube: 2400 mL    Voided: 880 mL  Total OUT: 3280 mL    Total NET: -1405 mL      16 Oct 2018 07:01  -  17 Oct 2018 04:47  --------------------------------------------------------  IN:    Lactated Ringers IV Bolus: 1000 mL    multiple electrolytes Injection Type 1multiple electrolytes Injection Type 1: 450 mL    sodium chloride 0.9%.: 600 mL    Solution: 100 mL    Solution: 100 mL  Total IN: 2250 mL    OUT:    Nasoenteral Tube: 800 mL    Voided: 1300 mL  Total OUT: 2100 mL    Total NET: 150 mL          LABS:                        12.2   5.4   )-----------( 399      ( 16 Oct 2018 08:41 )             40.9     10-16    137  |  94<L>  |  10.0  ----------------------------<  72  4.4   |  28.0  |  0.90    Ca    8.4<L>      16 Oct 2018 08:41  Phos  2.8     10-16  Mg     1.8     10-16

## 2018-10-18 LAB
ANION GAP SERPL CALC-SCNC: 14 MMOL/L — SIGNIFICANT CHANGE UP (ref 5–17)
APTT BLD: 31.7 SEC — SIGNIFICANT CHANGE UP (ref 27.5–37.4)
BASOPHILS # BLD AUTO: 0 K/UL — SIGNIFICANT CHANGE UP (ref 0–0.2)
BASOPHILS NFR BLD AUTO: 0.5 % — SIGNIFICANT CHANGE UP (ref 0–2)
BUN SERPL-MCNC: 6 MG/DL — LOW (ref 8–20)
CALCIUM SERPL-MCNC: 8.4 MG/DL — LOW (ref 8.6–10.2)
CHLORIDE SERPL-SCNC: 97 MMOL/L — LOW (ref 98–107)
CO2 SERPL-SCNC: 28 MMOL/L — SIGNIFICANT CHANGE UP (ref 22–29)
CREAT SERPL-MCNC: 1.01 MG/DL — SIGNIFICANT CHANGE UP (ref 0.5–1.3)
EOSINOPHIL # BLD AUTO: 0.1 K/UL — SIGNIFICANT CHANGE UP (ref 0–0.5)
EOSINOPHIL NFR BLD AUTO: 3 % — SIGNIFICANT CHANGE UP (ref 0–5)
GLUCOSE SERPL-MCNC: 75 MG/DL — SIGNIFICANT CHANGE UP (ref 70–115)
HCT VFR BLD CALC: 37.2 % — LOW (ref 42–52)
HGB BLD-MCNC: 10.9 G/DL — LOW (ref 14–18)
LYMPHOCYTES # BLD AUTO: 0.7 K/UL — LOW (ref 1–4.8)
LYMPHOCYTES # BLD AUTO: 20.1 % — SIGNIFICANT CHANGE UP (ref 20–55)
MAGNESIUM SERPL-MCNC: 1.7 MG/DL — LOW (ref 1.8–2.6)
MCHC RBC-ENTMCNC: 26.3 PG — LOW (ref 27–31)
MCHC RBC-ENTMCNC: 29.3 G/DL — LOW (ref 32–36)
MCV RBC AUTO: 89.9 FL — SIGNIFICANT CHANGE UP (ref 80–94)
MONOCYTES # BLD AUTO: 0.8 K/UL — SIGNIFICANT CHANGE UP (ref 0–0.8)
MONOCYTES NFR BLD AUTO: 20.3 % — HIGH (ref 3–10)
NEUTROPHILS # BLD AUTO: 2 K/UL — SIGNIFICANT CHANGE UP (ref 1.8–8)
NEUTROPHILS NFR BLD AUTO: 54.7 % — SIGNIFICANT CHANGE UP (ref 37–73)
NON-GYNECOLOGICAL CYTOLOGY STUDY: SIGNIFICANT CHANGE UP
PHOSPHATE SERPL-MCNC: 2.9 MG/DL — SIGNIFICANT CHANGE UP (ref 2.4–4.7)
PLATELET # BLD AUTO: 355 K/UL — SIGNIFICANT CHANGE UP (ref 150–400)
POTASSIUM SERPL-MCNC: 4 MMOL/L — SIGNIFICANT CHANGE UP (ref 3.5–5.3)
POTASSIUM SERPL-SCNC: 4 MMOL/L — SIGNIFICANT CHANGE UP (ref 3.5–5.3)
RBC # BLD: 4.14 M/UL — LOW (ref 4.6–6.2)
RBC # FLD: 16.8 % — HIGH (ref 11–15.6)
SODIUM SERPL-SCNC: 139 MMOL/L — SIGNIFICANT CHANGE UP (ref 135–145)
WBC # BLD: 3.7 K/UL — LOW (ref 4.8–10.8)
WBC # FLD AUTO: 3.7 K/UL — LOW (ref 4.8–10.8)

## 2018-10-18 PROCEDURE — 93010 ELECTROCARDIOGRAM REPORT: CPT | Mod: 77

## 2018-10-18 PROCEDURE — 93010 ELECTROCARDIOGRAM REPORT: CPT

## 2018-10-18 RX ORDER — HEPARIN SODIUM 5000 [USP'U]/ML
INJECTION INTRAVENOUS; SUBCUTANEOUS
Qty: 25000 | Refills: 0 | Status: DISCONTINUED | OUTPATIENT
Start: 2018-10-18 | End: 2018-10-18

## 2018-10-18 RX ORDER — HEPARIN SODIUM 5000 [USP'U]/ML
INJECTION INTRAVENOUS; SUBCUTANEOUS
Qty: 25000 | Refills: 0 | Status: DISCONTINUED | OUTPATIENT
Start: 2018-10-18 | End: 2018-10-20

## 2018-10-18 RX ORDER — PANTOPRAZOLE SODIUM 20 MG/1
40 TABLET, DELAYED RELEASE ORAL
Qty: 0 | Refills: 0 | Status: DISCONTINUED | OUTPATIENT
Start: 2018-10-18 | End: 2018-10-24

## 2018-10-18 RX ORDER — HYDRALAZINE HCL 50 MG
25 TABLET ORAL EVERY 8 HOURS
Qty: 0 | Refills: 0 | Status: DISCONTINUED | OUTPATIENT
Start: 2018-10-18 | End: 2018-10-18

## 2018-10-18 RX ORDER — AMOXICILLIN 250 MG/5ML
1000 SUSPENSION, RECONSTITUTED, ORAL (ML) ORAL EVERY 12 HOURS
Qty: 0 | Refills: 0 | Status: DISCONTINUED | OUTPATIENT
Start: 2018-10-18 | End: 2018-10-24

## 2018-10-18 RX ORDER — ACETAMINOPHEN 500 MG
650 TABLET ORAL EVERY 6 HOURS
Qty: 0 | Refills: 0 | Status: DISCONTINUED | OUTPATIENT
Start: 2018-10-18 | End: 2018-10-24

## 2018-10-18 RX ORDER — OXYCODONE HYDROCHLORIDE 5 MG/1
5 TABLET ORAL EVERY 4 HOURS
Qty: 0 | Refills: 0 | Status: DISCONTINUED | OUTPATIENT
Start: 2018-10-18 | End: 2018-10-24

## 2018-10-18 RX ORDER — OXYCODONE HYDROCHLORIDE 5 MG/1
10 TABLET ORAL EVERY 4 HOURS
Qty: 0 | Refills: 0 | Status: DISCONTINUED | OUTPATIENT
Start: 2018-10-18 | End: 2018-10-24

## 2018-10-18 RX ORDER — CLARITHROMYCIN 500 MG
500 TABLET ORAL EVERY 12 HOURS
Qty: 0 | Refills: 0 | Status: DISCONTINUED | OUTPATIENT
Start: 2018-10-18 | End: 2018-10-24

## 2018-10-18 RX ORDER — MAGNESIUM SULFATE 500 MG/ML
2 VIAL (ML) INJECTION ONCE
Qty: 0 | Refills: 0 | Status: COMPLETED | OUTPATIENT
Start: 2018-10-18 | End: 2018-10-18

## 2018-10-18 RX ADMIN — SODIUM CHLORIDE 3 MILLILITER(S): 9 INJECTION INTRAMUSCULAR; INTRAVENOUS; SUBCUTANEOUS at 15:03

## 2018-10-18 RX ADMIN — PIPERACILLIN AND TAZOBACTAM 25 GRAM(S): 4; .5 INJECTION, POWDER, LYOPHILIZED, FOR SOLUTION INTRAVENOUS at 05:29

## 2018-10-18 RX ADMIN — Medication 650 MILLIGRAM(S): at 23:50

## 2018-10-18 RX ADMIN — HEPARIN SODIUM 2300 UNIT(S)/HR: 5000 INJECTION INTRAVENOUS; SUBCUTANEOUS at 20:56

## 2018-10-18 RX ADMIN — HYDROMORPHONE HYDROCHLORIDE 1 MILLIGRAM(S): 2 INJECTION INTRAMUSCULAR; INTRAVENOUS; SUBCUTANEOUS at 04:22

## 2018-10-18 RX ADMIN — Medication 50 MICROGRAM(S): at 05:30

## 2018-10-18 RX ADMIN — Medication 50 GRAM(S): at 11:44

## 2018-10-18 RX ADMIN — HEPARIN SODIUM 7500 UNIT(S): 5000 INJECTION INTRAVENOUS; SUBCUTANEOUS at 05:29

## 2018-10-18 RX ADMIN — HYDROMORPHONE HYDROCHLORIDE 1 MILLIGRAM(S): 2 INJECTION INTRAMUSCULAR; INTRAVENOUS; SUBCUTANEOUS at 03:32

## 2018-10-18 RX ADMIN — CHLORHEXIDINE GLUCONATE 1 APPLICATION(S): 213 SOLUTION TOPICAL at 11:44

## 2018-10-18 RX ADMIN — SODIUM CHLORIDE 3 MILLILITER(S): 9 INJECTION INTRAMUSCULAR; INTRAVENOUS; SUBCUTANEOUS at 05:30

## 2018-10-18 RX ADMIN — Medication 500 MILLIGRAM(S): at 17:45

## 2018-10-18 RX ADMIN — Medication 100 MILLIGRAM(S): at 05:31

## 2018-10-18 RX ADMIN — OXYCODONE HYDROCHLORIDE 10 MILLIGRAM(S): 5 TABLET ORAL at 17:18

## 2018-10-18 RX ADMIN — Medication 1000 MILLIGRAM(S): at 17:45

## 2018-10-18 RX ADMIN — HEPARIN SODIUM 7500 UNIT(S): 5000 INJECTION INTRAVENOUS; SUBCUTANEOUS at 14:50

## 2018-10-18 RX ADMIN — Medication 650 MILLIGRAM(S): at 18:10

## 2018-10-18 RX ADMIN — PANTOPRAZOLE SODIUM 40 MILLIGRAM(S): 20 TABLET, DELAYED RELEASE ORAL at 17:47

## 2018-10-18 RX ADMIN — OXYCODONE HYDROCHLORIDE 10 MILLIGRAM(S): 5 TABLET ORAL at 14:58

## 2018-10-18 RX ADMIN — Medication 5 MILLIGRAM(S): at 05:30

## 2018-10-18 RX ADMIN — Medication 650 MILLIGRAM(S): at 11:45

## 2018-10-18 RX ADMIN — Medication 10 MILLIGRAM(S): at 05:29

## 2018-10-18 RX ADMIN — Medication 650 MILLIGRAM(S): at 17:58

## 2018-10-18 RX ADMIN — SODIUM CHLORIDE 3 MILLILITER(S): 9 INJECTION INTRAMUSCULAR; INTRAVENOUS; SUBCUTANEOUS at 23:45

## 2018-10-18 RX ADMIN — Medication 650 MILLIGRAM(S): at 12:53

## 2018-10-18 RX ADMIN — PANTOPRAZOLE SODIUM 40 MILLIGRAM(S): 20 TABLET, DELAYED RELEASE ORAL at 05:29

## 2018-10-18 NOTE — PROGRESS NOTE ADULT - ASSESSMENT
58M with possible ileus, may also have intra-abdominal complication as cause for distension. s/p paracentesis by IR negative for gastric fluid, possible tara patch leak ruled out. TSH elevated to 22, possible cause of ileus.    Plan:  -NGT removed  - Endo recs: likely hashimoto thyroiditis start Ltd 50 mcg qd, recheck TFTs in 1 week  - started synthroid   -monitor bowel function  -cont H Pylor treatment with ppi, amoxi and clarithro until 11/25  -Encourage IS/ambulation  -dvt ppx: heparin

## 2018-10-18 NOTE — CHART NOTE - NSCHARTNOTEFT_GEN_A_CORE
ACS Resident at bedside due to acute onset a.fib since last night with HR going up to 135. He was placed on cardizem PO 30mg, but is still yet to be rate or rhythm control. Upon examination, patient denies feeling SOB, chest pain or chest palpitations. Patient is seen at bedside and resting comfortably. Patient was placed on a portable monitor which read that he was still in A.fib and HR in 120's. 26mg of cardiazem was pushed based on pt's weight (130kg). His HR responded and stayed in the 80's, however he was still in a.fib. Awaiting lab to draw aPTT in order to start hep gtt for acute onset a.fib    Plan:   - continue on monitor  - start heparin gtt  - reconsult cards ACS Resident at bedside due to acute onset a.fib since last night with HR going up to 135. He was placed on cardizem PO 30mg, but is still yet to be rate or rhythm control. Upon examination, patient denies feeling SOB, chest pain or chest palpitations. Patient is seen at bedside and resting comfortably. Patient was placed on a portable monitor which read that he was still in A.fib and HR in 120's. 26mg of cardiazem was pushed based on pt's weight (130kg). His HR responded and stayed in the 80's, however he was still in a.fib. Awaiting lab to draw aPTT in order to start hep gtt for acute onset a.fib    Plan:   - continue on monitor  - start heparin gtt      ADDENDUM:  PTT: 31.7    a/p  started patient on heparin gtt based on the normogram and his weight, leading to 23mL/hr. To repeat aPTT in 6 hours. Currently, patient is a.fib with a HR at 92. Patient still asymptomatic.

## 2018-10-18 NOTE — PROGRESS NOTE ADULT - SUBJECTIVE AND OBJECTIVE BOX
No acute events over night. HDS. Minimal NGT output for last 15 hrs, NGT removed yesterday afternoon. Elevated TSH, consulted Endo    MEDICATIONS  (STANDING):  chlorhexidine 2% Cloths 1 Application(s) Topical daily  heparin  Injectable 7500 Unit(s) SubCutaneous every 8 hours  hydrALAZINE Injectable 10 milliGRAM(s) IV Push every 6 hours  levothyroxine 50 MICROGram(s) Oral daily  metoprolol tartrate Injectable 5 milliGRAM(s) IV Push every 6 hours  metroNIDAZOLE  IVPB 500 milliGRAM(s) IV Intermittent every 12 hours  pantoprazole  Injectable 40 milliGRAM(s) IV Push two times a day  piperacillin/tazobactam IVPB. 3.375 Gram(s) IV Intermittent every 8 hours  sodium chloride 0.9% lock flush 3 milliLiter(s) IV Push every 8 hours  sodium chloride 0.9%. 1000 milliLiter(s) (75 mL/Hr) IV Continuous <Continuous>    MEDICATIONS  (PRN):  bisacodyl Suppository 10 milliGRAM(s) Rectal once PRN Constipation  HYDROmorphone  Injectable 0.5 milliGRAM(s) IV Push every 3 hours PRN Moderate Pain (4 - 6)  HYDROmorphone  Injectable 1 milliGRAM(s) IV Push every 3 hours PRN Severe Pain (7 - 10)  ondansetron Injectable 4 milliGRAM(s) IV Push every 6 hours PRN Nausea and/or Vomiting      Vital Signs Last 24 Hrs  T(C): 36.7 (17 Oct 2018 22:01), Max: 37 (17 Oct 2018 09:35)  T(F): 98.1 (17 Oct 2018 22:01), Max: 98.6 (17 Oct 2018 09:35)  HR: 101 (17 Oct 2018 22:01) (64 - 131)  BP: 122/62 (17 Oct 2018 22:01) (110/58 - 124/69)  BP(mean): --  RR: 18 (17 Oct 2018 16:17) (18 - 20)  SpO2: 92% (17 Oct 2018 16:17) (92% - 92%)    PE  Gen:  NAD  Pulm: CTAB/L  CV: RRR  Abd: hard, distended, nontender, tympanic to percussion, incisions c/d/i  Ext:  +2 nonpitting edema; no cyanosis or clubbing  Neuro:  AAOx3, no sensory or motor deficits        I&O's Detail    16 Oct 2018 07:01  -  17 Oct 2018 07:00  --------------------------------------------------------  IN:    Lactated Ringers IV Bolus: 1000 mL    multiple electrolytes Injection Type 1multiple electrolytes Injection Type 1: 450 mL    sodium chloride 0.9%.: 900 mL    Solution: 100 mL    Solution: 100 mL  Total IN: 2550 mL    OUT:    Nasoenteral Tube: 1000 mL    Voided: 1900 mL  Total OUT: 2900 mL    Total NET: -350 mL      17 Oct 2018 07:01  -  18 Oct 2018 01:20  --------------------------------------------------------  IN:    sodium chloride 0.9%.: 825 mL    Solution: 100 mL    Solution: 50 mL    Solution: 100 mL  Total IN: 1075 mL    OUT:    Voided: 1850 mL  Total OUT: 1850 mL    Total NET: -775 mL          LABS:                        11.2   3.9   )-----------( 356      ( 17 Oct 2018 08:38 )             37.8     10-17    138  |  96<L>  |  7.0<L>  ----------------------------<  66<L>  4.3   |  27.0  |  0.99    Ca    8.3<L>      17 Oct 2018 08:38  Phos  3.0     10-17  Mg     1.5     10-17            RADIOLOGY & ADDITIONAL STUDIES:

## 2018-10-19 LAB
ANION GAP SERPL CALC-SCNC: 11 MMOL/L — SIGNIFICANT CHANGE UP (ref 5–17)
APTT BLD: 149.8 SEC — CRITICAL HIGH (ref 27.5–37.4)
APTT BLD: 35.1 SEC — SIGNIFICANT CHANGE UP (ref 27.5–37.4)
APTT BLD: 45.5 SEC — HIGH (ref 27.5–37.4)
BUN SERPL-MCNC: 5 MG/DL — LOW (ref 8–20)
CALCIUM SERPL-MCNC: 8.3 MG/DL — LOW (ref 8.6–10.2)
CHLORIDE SERPL-SCNC: 95 MMOL/L — LOW (ref 98–107)
CO2 SERPL-SCNC: 29 MMOL/L — SIGNIFICANT CHANGE UP (ref 22–29)
CREAT SERPL-MCNC: 0.93 MG/DL — SIGNIFICANT CHANGE UP (ref 0.5–1.3)
GLUCOSE SERPL-MCNC: 89 MG/DL — SIGNIFICANT CHANGE UP (ref 70–115)
HCT VFR BLD CALC: 36 % — LOW (ref 42–52)
HCT VFR BLD CALC: 37.2 % — LOW (ref 42–52)
HGB BLD-MCNC: 10.8 G/DL — LOW (ref 14–18)
HGB BLD-MCNC: 11.7 G/DL — LOW (ref 14–18)
MAGNESIUM SERPL-MCNC: 1.7 MG/DL — SIGNIFICANT CHANGE UP (ref 1.6–2.6)
MCHC RBC-ENTMCNC: 26.7 PG — LOW (ref 27–31)
MCHC RBC-ENTMCNC: 27.4 PG — SIGNIFICANT CHANGE UP (ref 27–31)
MCHC RBC-ENTMCNC: 30 G/DL — LOW (ref 32–36)
MCHC RBC-ENTMCNC: 31.5 G/DL — LOW (ref 32–36)
MCV RBC AUTO: 87.1 FL — SIGNIFICANT CHANGE UP (ref 80–94)
MCV RBC AUTO: 89.1 FL — SIGNIFICANT CHANGE UP (ref 80–94)
PHOSPHATE SERPL-MCNC: 2.9 MG/DL — SIGNIFICANT CHANGE UP (ref 2.4–4.7)
PLATELET # BLD AUTO: 337 K/UL — SIGNIFICANT CHANGE UP (ref 150–400)
PLATELET # BLD AUTO: 426 K/UL — HIGH (ref 150–400)
POTASSIUM SERPL-MCNC: 4.6 MMOL/L — SIGNIFICANT CHANGE UP (ref 3.5–5.3)
POTASSIUM SERPL-SCNC: 4.6 MMOL/L — SIGNIFICANT CHANGE UP (ref 3.5–5.3)
RBC # BLD: 4.04 M/UL — LOW (ref 4.6–6.2)
RBC # BLD: 4.27 M/UL — LOW (ref 4.6–6.2)
RBC # FLD: 16.8 % — HIGH (ref 11–15.6)
RBC # FLD: 17.2 % — HIGH (ref 11–15.6)
SODIUM SERPL-SCNC: 135 MMOL/L — SIGNIFICANT CHANGE UP (ref 135–145)
WBC # BLD: 4.7 K/UL — LOW (ref 4.8–10.8)
WBC # BLD: 5.4 K/UL — SIGNIFICANT CHANGE UP (ref 4.8–10.8)
WBC # FLD AUTO: 4.7 K/UL — LOW (ref 4.8–10.8)
WBC # FLD AUTO: 5.4 K/UL — SIGNIFICANT CHANGE UP (ref 4.8–10.8)

## 2018-10-19 RX ORDER — MAGNESIUM OXIDE 400 MG ORAL TABLET 241.3 MG
400 TABLET ORAL ONCE
Qty: 0 | Refills: 0 | Status: COMPLETED | OUTPATIENT
Start: 2018-10-19 | End: 2018-10-19

## 2018-10-19 RX ORDER — LISINOPRIL 2.5 MG/1
2.5 TABLET ORAL DAILY
Qty: 0 | Refills: 0 | Status: DISCONTINUED | OUTPATIENT
Start: 2018-10-19 | End: 2018-10-24

## 2018-10-19 RX ORDER — METOPROLOL TARTRATE 50 MG
50 TABLET ORAL
Qty: 0 | Refills: 0 | Status: DISCONTINUED | OUTPATIENT
Start: 2018-10-19 | End: 2018-10-22

## 2018-10-19 RX ORDER — ONDANSETRON 8 MG/1
4 TABLET, FILM COATED ORAL ONCE
Qty: 0 | Refills: 0 | Status: COMPLETED | OUTPATIENT
Start: 2018-10-19 | End: 2018-10-19

## 2018-10-19 RX ADMIN — SODIUM CHLORIDE 3 MILLILITER(S): 9 INJECTION INTRAMUSCULAR; INTRAVENOUS; SUBCUTANEOUS at 05:06

## 2018-10-19 RX ADMIN — Medication 650 MILLIGRAM(S): at 05:07

## 2018-10-19 RX ADMIN — HEPARIN SODIUM 1900 UNIT(S)/HR: 5000 INJECTION INTRAVENOUS; SUBCUTANEOUS at 05:03

## 2018-10-19 RX ADMIN — PANTOPRAZOLE SODIUM 40 MILLIGRAM(S): 20 TABLET, DELAYED RELEASE ORAL at 05:11

## 2018-10-19 RX ADMIN — Medication 500 MILLIGRAM(S): at 05:07

## 2018-10-19 RX ADMIN — HEPARIN SODIUM 2400 UNIT(S)/HR: 5000 INJECTION INTRAVENOUS; SUBCUTANEOUS at 11:41

## 2018-10-19 RX ADMIN — OXYCODONE HYDROCHLORIDE 10 MILLIGRAM(S): 5 TABLET ORAL at 01:34

## 2018-10-19 RX ADMIN — OXYCODONE HYDROCHLORIDE 5 MILLIGRAM(S): 5 TABLET ORAL at 11:25

## 2018-10-19 RX ADMIN — Medication 650 MILLIGRAM(S): at 01:34

## 2018-10-19 RX ADMIN — ONDANSETRON 4 MILLIGRAM(S): 8 TABLET, FILM COATED ORAL at 13:17

## 2018-10-19 RX ADMIN — Medication 50 MILLIGRAM(S): at 17:25

## 2018-10-19 RX ADMIN — SODIUM CHLORIDE 3 MILLILITER(S): 9 INJECTION INTRAMUSCULAR; INTRAVENOUS; SUBCUTANEOUS at 13:13

## 2018-10-19 RX ADMIN — CHLORHEXIDINE GLUCONATE 1 APPLICATION(S): 213 SOLUTION TOPICAL at 11:24

## 2018-10-19 RX ADMIN — PANTOPRAZOLE SODIUM 40 MILLIGRAM(S): 20 TABLET, DELAYED RELEASE ORAL at 17:25

## 2018-10-19 RX ADMIN — OXYCODONE HYDROCHLORIDE 5 MILLIGRAM(S): 5 TABLET ORAL at 10:48

## 2018-10-19 RX ADMIN — Medication 650 MILLIGRAM(S): at 17:54

## 2018-10-19 RX ADMIN — OXYCODONE HYDROCHLORIDE 10 MILLIGRAM(S): 5 TABLET ORAL at 02:14

## 2018-10-19 RX ADMIN — Medication 500 MILLIGRAM(S): at 17:27

## 2018-10-19 RX ADMIN — SODIUM CHLORIDE 3 MILLILITER(S): 9 INJECTION INTRAMUSCULAR; INTRAVENOUS; SUBCUTANEOUS at 21:48

## 2018-10-19 RX ADMIN — HEPARIN SODIUM 2700 UNIT(S)/HR: 5000 INJECTION INTRAVENOUS; SUBCUTANEOUS at 19:57

## 2018-10-19 RX ADMIN — Medication 650 MILLIGRAM(S): at 11:24

## 2018-10-19 RX ADMIN — MAGNESIUM OXIDE 400 MG ORAL TABLET 400 MILLIGRAM(S): 241.3 TABLET ORAL at 13:17

## 2018-10-19 RX ADMIN — Medication 1000 MILLIGRAM(S): at 17:25

## 2018-10-19 RX ADMIN — Medication 650 MILLIGRAM(S): at 23:34

## 2018-10-19 RX ADMIN — Medication 650 MILLIGRAM(S): at 12:25

## 2018-10-19 RX ADMIN — Medication 50 MICROGRAM(S): at 05:07

## 2018-10-19 RX ADMIN — HEPARIN SODIUM 0 UNIT(S)/HR: 5000 INJECTION INTRAVENOUS; SUBCUTANEOUS at 03:58

## 2018-10-19 RX ADMIN — Medication 1000 MILLIGRAM(S): at 05:07

## 2018-10-19 RX ADMIN — Medication 650 MILLIGRAM(S): at 17:25

## 2018-10-19 RX ADMIN — LISINOPRIL 2.5 MILLIGRAM(S): 2.5 TABLET ORAL at 17:53

## 2018-10-19 NOTE — PROGRESS NOTE ADULT - ASSESSMENT
58 year old man s/p Ex La, tara patch and an open cholecystectomy on 10/8 for a perforated DU has resolved his previous ileus and better rate-controlled Afib.       Plan:  -f/u with cardiology for long term Afib management  - Endo recs: likely hashimoto thyroiditis start Ltd 50 mcg qd, recheck TFTs in 1 week  - monitor bowel function  -cont H Pylor treatment with ppi, amoxi and clarithro until 11/25  -Encourage IS/ambulation  -dvt ppx: heparin 58 year old man s/p Ex La, tara patch and an open cholecystectomy on 10/8 for a perforated DU has resolved his previous ileus and better rate-controlled Afib.       Plan:  -f/u with cardiology for long term Afib management  - Endo recs: likely hashimoto thyroiditis start Ltd 50 mcg qd, recheck TFTs in 1 week  - monitor bowel function  -cont H Pylor treatment with ppi, amoxi and clarithro until 11/25  -Encourage IS/ambulation  -dvt ppx: heparin  -Wound care instructions, daily.  Wet to dry dressings with krelix, cover with ABD pad.

## 2018-10-19 NOTE — PROGRESS NOTE ADULT - SUBJECTIVE AND OBJECTIVE BOX
INTERVAL HPI/OVERNIGHT EVENTS:  Patient seen at bedside this morning with no major complaints. Midline abdominal dressings from his ex lap were changed. Wound appears clean, with no purulent discharge and good granulation tissue along the margins. Patient was in Afib with a HR between 120-130 yesterday, given 26 mg of cardiazem controlling HR to 80-90s. Patient was recently started on PO Cardizem 30 mg and will have cardiology follow for better rate control. Heparin drip was also initiated. Patient currently on CLD, tolerating diet with no nausea nor vomiting. Patient denies SOB, chest pain, fever and chills.        MEDICATIONS  (STANDING):  acetaminophen   Tablet .. 650 milliGRAM(s) Oral every 6 hours  amoxicillin      Capsule 1000 milliGRAM(s) Oral every 12 hours  chlorhexidine 2% Cloths 1 Application(s) Topical daily  clarithromycin 500 milliGRAM(s) Oral every 12 hours  diltiazem    Tablet 30 milliGRAM(s) Oral every 6 hours  heparin  Infusion.  Unit(s)/Hr (23 mL/Hr) IV Continuous <Continuous>  levothyroxine 50 MICROGram(s) Oral daily  pantoprazole    Tablet 40 milliGRAM(s) Oral two times a day  sodium chloride 0.9% lock flush 3 milliLiter(s) IV Push every 8 hours    MEDICATIONS  (PRN):  bisacodyl Suppository 10 milliGRAM(s) Rectal once PRN Constipation  ondansetron Injectable 4 milliGRAM(s) IV Push every 6 hours PRN Nausea and/or Vomiting  oxyCODONE    IR 5 milliGRAM(s) Oral every 4 hours PRN Moderate Pain (4 - 6)  oxyCODONE    IR 10 milliGRAM(s) Oral every 4 hours PRN Severe Pain (7 - 10)      Vital Signs Last 24 Hrs  T(C): 36.6 (19 Oct 2018 08:19), Max: 36.8 (18 Oct 2018 11:11)  T(F): 97.9 (19 Oct 2018 08:19), Max: 98.3 (18 Oct 2018 11:11)  HR: 97 (19 Oct 2018 08:19) (88 - 109)  BP: 126/72 (19 Oct 2018 08:19) (119/75 - 141/75)  BP(mean): --  RR: 18 (19 Oct 2018 08:19) (18 - 20)  SpO2: 97% (19 Oct 2018 08:19) (97% - 97%)    PE  Gen: not in acute distress, pleasant man  Pulm: CTAB/L  CV: normal S1 and S2, no murmurs, gallops nor thrills  Abd: hard, distended, nontender, tympanic to percussion, incisions c/d/i  Ext:  edema has improved b/l; no cyanosis or clubbing  Neuro:  AAOx3, no sensory or motor deficits      I&O's Detail    18 Oct 2018 07:01  -  19 Oct 2018 07:00  --------------------------------------------------------  IN:    heparin  Infusion.: 237 mL    Oral Fluid: 440 mL    sodium chloride 0.9%: 150 mL  Total IN: 827 mL    OUT:    Stool: 1 mL    Voided: 1965 mL  Total OUT: 1966 mL    Total NET: -1139 mL          LABS:                        10.8   4.7   )-----------( 337      ( 19 Oct 2018 03:21 )             36.0     10-19    135  |  95<L>  |  5.0<L>  ----------------------------<  89  4.6   |  29.0  |  0.93    Ca    8.3<L>      19 Oct 2018 03:22  Phos  2.9     10-19  Mg     1.7     10-19      PTT - ( 19 Oct 2018 03:21 )  PTT:149.8 sec      RADIOLOGY & ADDITIONAL STUDIES:

## 2018-10-19 NOTE — CHART NOTE - NSCHARTNOTEFT_GEN_A_CORE
Source: Patient [x ]  Family [ ]   other [ ]    Current Diet:  NG tube now discontinued started on clears 10/18  s/p Ex Lap, tara patch and an open cholecystectomy on 10/8 for a perforated DU has resolved his previous ileus and better rate-controlled Afib.       Patient reports [ ] nausea  [ ] vomiting [ ] diarrhea [ ] constipation  [ ]chewing problems [ ] swallowing issues  [ ] other:     PO intake:  < 50% [ ]   50-75%  [ ]   %  [ x]  other :    Source for PO intake [x ] Patient [ ] family [ ] chart [ ] staff [ ] other    Enteral /Parenteral Nutrition:     Current Weight: 10/17 - 298#  10/8 - 324#    % Weight Change   Noted 26# wt loss during admission secondary to no solid food consumption during admission since 10/8      % Weight Change     Pertinent Medications: MEDICATIONS  (STANDING):  acetaminophen   Tablet .. 650 milliGRAM(s) Oral every 6 hours  amoxicillin      Capsule 1000 milliGRAM(s) Oral every 12 hours  chlorhexidine 2% Cloths 1 Application(s) Topical daily  clarithromycin 500 milliGRAM(s) Oral every 12 hours  diltiazem    Tablet 30 milliGRAM(s) Oral every 6 hours  heparin  Infusion.  Unit(s)/Hr (23 mL/Hr) IV Continuous <Continuous>  levothyroxine 50 MICROGram(s) Oral daily  pantoprazole    Tablet 40 milliGRAM(s) Oral two times a day  sodium chloride 0.9% lock flush 3 milliLiter(s) IV Push every 8 hours    MEDICATIONS  (PRN):  bisacodyl Suppository 10 milliGRAM(s) Rectal once PRN Constipation  ondansetron Injectable 4 milliGRAM(s) IV Push every 6 hours PRN Nausea and/or Vomiting  oxyCODONE    IR 5 milliGRAM(s) Oral every 4 hours PRN Moderate Pain (4 - 6)  oxyCODONE    IR 10 milliGRAM(s) Oral every 4 hours PRN Severe Pain (7 - 10)    Pertinent Labs: CBC Full  -  ( 19 Oct 2018 08:22 )  WBC Count : 5.4 K/uL  Hemoglobin : 11.7 g/dL  Hematocrit : 37.2 %  Platelet Count - Automated : 426 K/uL  Mean Cell Volume : 87.1 fl  Mean Cell Hemoglobin : 27.4 pg  Mean Cell Hemoglobin Concentration : 31.5 g/dL  Auto Neutrophil # : x  Auto Lymphocyte # : x  Auto Monocyte # : x  Auto Eosinophil # : x  Auto Basophil # : x  Auto Neutrophil % : x  Auto Lymphocyte % : x  Auto Monocyte % : x  Auto Eosinophil % : x  Auto Basophil % : x      10-19 Na135 mmol/L Glu 89 mg/dL K+ 4.6 mmol/L Cr  0.93 mg/dL BUN 5.0 mg/dL<L> Phos 2.9 mg/dL Alb n/a   PAB n/a           Skin:     Nutrition focused physical exam conducted - found signs of malnutrition [x ]absent [ ]present    Subcutaneous fat loss: [ ] Orbital fat pads region, [ ]Buccal fat region, [ ]Triceps region,  [ ]Ribs region    Muscle wasting: [ ]Temples region, [ ]Clavicle region, [ ]Shoulder region, [ ]Scapula region, [ ]Interosseous region,  [ ]thigh region, [ ]Calf region    Estimated Needs:   [x ] no change since previous assessment  [ ] recalculated:     Current Nutrition Diagnosis:  Pt meets criteria for Severe acute protein calorie malnutrition related to inadequate protein energy intake in setting of perforated duodenal ulcer s/p ex-lap w/ Tara patch repair and  open cholecystectomy, paracentesis and possible ileus as evidenced by 8% (26#) wt loss in 1.5 weeks, +2 nonpitting edema, and estimated intake <25% estimated energy needs for 1.5 weeks secondary to NPO/clear liquid status. Pt admitted for abdominal pain and SOB secondary to perforated duodenal ulcer. During initial assessment Pt stated his # and recently has been 275#, admission wt however is 324#.     Recommendations:   1. Rec  advance as tolerated to regular consistency low-residue, DASH/TLC   2. May need supplementation as diet is advanced      Monitoring and Evaluation:   [x ] PO intake [x ] Tolerance to diet prescription [X] Weights  [X] Follow up per protocol [X] Labs: Source: Patient [x ]  Family [ ]   other [ ]    Current Diet:  NG tube now discontinued started on clears 10/18  s/p Ex Lap, tara patch and an open cholecystectomy on 10/8 for a perforated DU has resolved his previous ileus and better rate-controlled Afib.       Patient reports [ ] nausea  [ ] vomiting [ ] diarrhea [ ] constipation  [ ]chewing problems [ ] swallowing issues  [ ] other:     PO intake:  < 50% [ ]   50-75%  [ ]   %  [ x]  other :    Source for PO intake [x ] Patient [ ] family [ ] chart [ ] staff [ ] other    Enteral /Parenteral Nutrition:     Current Weight: 10/17 - 298#, 10/17 302#, 10/11 327#  10/8 - 324#    % Weight Change   Noted 26# wt loss during admission secondary to no solid food consumption during admission since 10/8      % Weight Change     Pertinent Medications: MEDICATIONS  (STANDING):  acetaminophen   Tablet .. 650 milliGRAM(s) Oral every 6 hours  amoxicillin      Capsule 1000 milliGRAM(s) Oral every 12 hours  chlorhexidine 2% Cloths 1 Application(s) Topical daily  clarithromycin 500 milliGRAM(s) Oral every 12 hours  diltiazem    Tablet 30 milliGRAM(s) Oral every 6 hours  heparin  Infusion.  Unit(s)/Hr (23 mL/Hr) IV Continuous <Continuous>  levothyroxine 50 MICROGram(s) Oral daily  pantoprazole    Tablet 40 milliGRAM(s) Oral two times a day  sodium chloride 0.9% lock flush 3 milliLiter(s) IV Push every 8 hours    MEDICATIONS  (PRN):  bisacodyl Suppository 10 milliGRAM(s) Rectal once PRN Constipation  ondansetron Injectable 4 milliGRAM(s) IV Push every 6 hours PRN Nausea and/or Vomiting  oxyCODONE    IR 5 milliGRAM(s) Oral every 4 hours PRN Moderate Pain (4 - 6)  oxyCODONE    IR 10 milliGRAM(s) Oral every 4 hours PRN Severe Pain (7 - 10)    Pertinent Labs: CBC Full  -  ( 19 Oct 2018 08:22 )  WBC Count : 5.4 K/uL  Hemoglobin : 11.7 g/dL  Hematocrit : 37.2 %  Platelet Count - Automated : 426 K/uL  Mean Cell Volume : 87.1 fl  Mean Cell Hemoglobin : 27.4 pg  Mean Cell Hemoglobin Concentration : 31.5 g/dL  Auto Neutrophil # : x  Auto Lymphocyte # : x  Auto Monocyte # : x  Auto Eosinophil # : x  Auto Basophil # : x  Auto Neutrophil % : x  Auto Lymphocyte % : x  Auto Monocyte % : x  Auto Eosinophil % : x  Auto Basophil % : x      10-19 Na135 mmol/L Glu 89 mg/dL K+ 4.6 mmol/L Cr  0.93 mg/dL BUN 5.0 mg/dL<L> Phos 2.9 mg/dL Alb n/a   PAB n/a           Skin:     Nutrition focused physical exam conducted - found signs of malnutrition [x ]absent [ ]present    Subcutaneous fat loss: [ ] Orbital fat pads region, [ ]Buccal fat region, [ ]Triceps region,  [ ]Ribs region    Muscle wasting: [ ]Temples region, [ ]Clavicle region, [ ]Shoulder region, [ ]Scapula region, [ ]Interosseous region,  [ ]thigh region, [ ]Calf region    Estimated Needs:   [x ] no change since previous assessment  [ ] recalculated:     Current Nutrition Diagnosis:  Pt meets criteria for Severe acute protein calorie malnutrition related to inadequate protein energy intake in setting of perforated duodenal ulcer s/p ex-lap w/ Tara patch repair and  open cholecystectomy, paracentesis and possible ileus as evidenced by 8% (25#) wt loss in 1.5 weeks, +2 nonpitting edema, and estimated intake <25% estimated energy needs for 1.5 weeks secondary to NPO/clear liquid status. Pt admitted for abdominal pain and SOB secondary to perforated duodenal ulcer. During initial assessment Pt stated his # and recently has been 275#, admission wt however is 324#. Pt now tolerating clear liquid diet well with good appetite.    Recommendations:   1. Rec  advance as tolerated to regular consistency low-residue, DASH/TLC   2. May need supplementation as diet is advanced      Monitoring and Evaluation:   [x ] PO intake [x ] Tolerance to diet prescription [X] Weights  [X] Follow up per protocol [X] Labs:

## 2018-10-19 NOTE — PROGRESS NOTE ADULT - SUBJECTIVE AND OBJECTIVE BOX
[facility]                                          [address], [address], [place] [address]                                               PHONE: [phone]    FAX: [phone] [address], [address], [place] [address]                                       PHONE: [phone]    FAX: [phone]  *******************************************************************************    cc: abdominal pain    HPI: TAI RUBI is a 58y Male with past medical history significant for morbid obesity and smoking with perforated viscus 2/2 duodenal ulcer,lactic acidosis and TALA,  found to be in rapid atrial fibrillation converted to SR. No CP, SOB, palpitations, PND or orthopnea. No syncope. s/p Respiratory failure. Likely septic shock, lactic acidosis. Bedside Echocardiogram reveals decreased LV systolic fx.  No major VHD. Pt was taken to OR and is now s/p open cholecystectomy.    Overnight events/Subjective Assessment: no new events      INTERPRETATION OF TELEMETRY (personally reviewed):    No Known Allergies    MEDICATIONS  (STANDING):  acetaminophen   Tablet .. 650 milliGRAM(s) Oral every 6 hours  amoxicillin      Capsule 1000 milliGRAM(s) Oral every 12 hours  chlorhexidine 2% Cloths 1 Application(s) Topical daily  clarithromycin 500 milliGRAM(s) Oral every 12 hours  diltiazem    Tablet 30 milliGRAM(s) Oral every 6 hours  heparin  Infusion.  Unit(s)/Hr (23 mL/Hr) IV Continuous <Continuous>  levothyroxine 50 MICROGram(s) Oral daily  pantoprazole    Tablet 40 milliGRAM(s) Oral two times a day  sodium chloride 0.9% lock flush 3 milliLiter(s) IV Push every 8 hours    MEDICATIONS  (PRN):  bisacodyl Suppository 10 milliGRAM(s) Rectal once PRN Constipation  oxyCODONE    IR 5 milliGRAM(s) Oral every 4 hours PRN Moderate Pain (4 - 6)  oxyCODONE    IR 10 milliGRAM(s) Oral every 4 hours PRN Severe Pain (7 - 10)      Vital Signs Last 24 Hrs  T(C): 36.6 (19 Oct 2018 08:19), Max: 36.7 (18 Oct 2018 23:57)  T(F): 97.9 (19 Oct 2018 08:19), Max: 98.1 (18 Oct 2018 23:57)  HR: 101 (19 Oct 2018 11:27) (88 - 101)  BP: 128/72 (19 Oct 2018 11:27) (119/75 - 141/75)  BP(mean): --  RR: 18 (19 Oct 2018 08:19) (18 - 20)  SpO2: 97% (19 Oct 2018 08:19) (97% - 97%)    I&O's Detail    18 Oct 2018 07:01  -  19 Oct 2018 07:00  --------------------------------------------------------  IN:    heparin  Infusion.: 237 mL    Oral Fluid: 440 mL    sodium chloride 0.9%: 150 mL  Total IN: 827 mL    OUT:    Stool: 1 mL    Voided: 1965 mL  Total OUT: 1966 mL    Total NET: -1139 mL      19 Oct 2018 07:01  -  19 Oct 2018 16:52  --------------------------------------------------------  IN:  Total IN: 0 mL    OUT:    Voided: 550 mL  Total OUT: 550 mL    Total NET: -550 mL        I&O's Summary    18 Oct 2018 07:01  -  19 Oct 2018 07:00  --------------------------------------------------------  IN: 827 mL / OUT: 1966 mL / NET: -1139 mL    19 Oct 2018 07:01  -  19 Oct 2018 16:52  --------------------------------------------------------  IN: 0 mL / OUT: 550 mL / NET: -550 mL            PHYSICAL EXAM:  General: Appears well developed, well nourished, no acute distress  HEENT: Head: normocephalic, atraumatic  Eyes: Pupils equal and reactive  Neck: Supple, no carotid bruit, no JVD, no HJR  CARDIOVASCULAR: Normal S1 and S2, 1/6 systolic murmur  LUNGS: Clear to auscultation bilaterally, no rales, rhonchi or wheeze  ABDOMEN: distended, dressing in place  EXTREMITIES: no to trace edema, chronic skin changes, distal pulses WNL  SKIN: Warm and dry with normal turgor  NEURO: Alert & oriented x 3, grossly intact  PSYCH: normal mood and affect        LABS:                        11.7   5.4   )-----------( 426      ( 19 Oct 2018 08:22 )             37.2     10-19    135  |  95<L>  |  5.0<L>  ----------------------------<  89  4.6   |  29.0  |  0.93    Ca    8.3<L>      19 Oct 2018 03:22  Phos  2.9     10-19  Mg     1.7     10-19          PTT - ( 19 Oct 2018 11:08 )  PTT:35.1 sec  serum  Lipids:     Thyroid Stimulating Hormone, Serum: 21.95 uIU/mL (10-17 @ 08:38)      RADIOLOGY & ADDITIONAL STUDIES:  ECHO: < from: TTE Echo Complete w/Doppler (10.09.18 @ 08:31) >  Summary:   1. Endocardial visualization was enhanced with intravenous echo contrast.   2. Mildly enlarged left atrium.   3. Global diffuse hypokinesis. Left ventricular ejection fraction, by   visual estimation, is 30 to 35%.   4. Mildly enlarged right atrium.   5. The right ventricular size mildly enlarged. Moderately reduced RV   systolic function.   6. No significant valvular abnormality.   7. There is no evidence of pericardial effusion.    < end of copied text >      ASSESSMENT AND PLAN:    In summary, TAI RUBI is a 58y Male with past medical history significant for morbid obesity and smoking with perforated viscus 2/2 duodenal ulcer, lactic acidosis and TALA,  found to be in rapid atrial fibrillation converted to SR. No CP, SOB, palpitations, PND or orthopnea. No syncope. s/p Respiratory failure. Likely septic shock, lactic acidosis. Bedside Echocardiogram reveals decreased LV systolic fx (30-35%).  No major VHD. Pt was taken to OR and is now s/p open cholecystectomy.     - On tele he is in AF with resting HR 80-90s.  With exertion, HR increased to 140-150.  In this setting, we target resting HR with goal of < 110.  Increased HR with exertion in setting of acute illness is not unexpected.  - Rhythm/hemodynamic:   - PAF. CHADSVACs score is only 1 (low).  There is no clearcut indication for long-term AC at the current time, especially in setting of recent surgery and open abdominal wound.  - Given low EF will convert cardizem to Lopressor 50mg q12h. Titrate prn with plan to convert to Toprol  (or higher) at discharge   - TALA has resolved.  Will start Lisinopril 2.5mg daily for afterload reduction.  - Keep K > 4, Mg > 2.   - Low EF. May have been due to sepsis and OCHOA. Would repeat echo as an outpt  - ABX per primary team  - Will pursue repeat echo and ischemic evaluation as an outpt when acute pathology resolved.  Will address strategies for rhythm control as outpatient as well.      Mike Desai MD

## 2018-10-20 LAB
ANION GAP SERPL CALC-SCNC: 11 MMOL/L — SIGNIFICANT CHANGE UP (ref 5–17)
APTT BLD: 30 SEC — SIGNIFICANT CHANGE UP (ref 27.5–37.4)
APTT BLD: >200 SEC — CRITICAL HIGH (ref 27.5–37.4)
BUN SERPL-MCNC: 3 MG/DL — LOW (ref 8–20)
CALCIUM SERPL-MCNC: 8.7 MG/DL — SIGNIFICANT CHANGE UP (ref 8.6–10.2)
CHLORIDE SERPL-SCNC: 96 MMOL/L — LOW (ref 98–107)
CO2 SERPL-SCNC: 30 MMOL/L — HIGH (ref 22–29)
CREAT SERPL-MCNC: 0.91 MG/DL — SIGNIFICANT CHANGE UP (ref 0.5–1.3)
GLUCOSE SERPL-MCNC: 93 MG/DL — SIGNIFICANT CHANGE UP (ref 70–115)
HCT VFR BLD CALC: 38.1 % — LOW (ref 42–52)
HGB BLD-MCNC: 11.6 G/DL — LOW (ref 14–18)
MAGNESIUM SERPL-MCNC: 1.6 MG/DL — SIGNIFICANT CHANGE UP (ref 1.6–2.6)
MCHC RBC-ENTMCNC: 26.9 PG — LOW (ref 27–31)
MCHC RBC-ENTMCNC: 30.4 G/DL — LOW (ref 32–36)
MCV RBC AUTO: 88.4 FL — SIGNIFICANT CHANGE UP (ref 80–94)
PHOSPHATE SERPL-MCNC: 3.7 MG/DL — SIGNIFICANT CHANGE UP (ref 2.4–4.7)
PLATELET # BLD AUTO: 393 K/UL — SIGNIFICANT CHANGE UP (ref 150–400)
POTASSIUM SERPL-MCNC: 4.2 MMOL/L — SIGNIFICANT CHANGE UP (ref 3.5–5.3)
POTASSIUM SERPL-SCNC: 4.2 MMOL/L — SIGNIFICANT CHANGE UP (ref 3.5–5.3)
RBC # BLD: 4.31 M/UL — LOW (ref 4.6–6.2)
RBC # FLD: 17.3 % — HIGH (ref 11–15.6)
SODIUM SERPL-SCNC: 137 MMOL/L — SIGNIFICANT CHANGE UP (ref 135–145)
THYROPEROXIDASE AB SERPL-ACNC: <10 IU/ML — SIGNIFICANT CHANGE UP (ref 0–34.9)
WBC # BLD: 5.2 K/UL — SIGNIFICANT CHANGE UP (ref 4.8–10.8)
WBC # FLD AUTO: 5.2 K/UL — SIGNIFICANT CHANGE UP (ref 4.8–10.8)

## 2018-10-20 RX ORDER — ONDANSETRON 8 MG/1
4 TABLET, FILM COATED ORAL EVERY 6 HOURS
Qty: 0 | Refills: 0 | Status: DISCONTINUED | OUTPATIENT
Start: 2018-10-20 | End: 2018-10-24

## 2018-10-20 RX ORDER — MAGNESIUM SULFATE 500 MG/ML
1 VIAL (ML) INJECTION ONCE
Qty: 0 | Refills: 0 | Status: COMPLETED | OUTPATIENT
Start: 2018-10-20 | End: 2018-10-20

## 2018-10-20 RX ADMIN — Medication 650 MILLIGRAM(S): at 12:44

## 2018-10-20 RX ADMIN — PANTOPRAZOLE SODIUM 40 MILLIGRAM(S): 20 TABLET, DELAYED RELEASE ORAL at 05:33

## 2018-10-20 RX ADMIN — Medication 650 MILLIGRAM(S): at 11:16

## 2018-10-20 RX ADMIN — Medication 50 MILLIGRAM(S): at 05:33

## 2018-10-20 RX ADMIN — Medication 500 MILLIGRAM(S): at 05:33

## 2018-10-20 RX ADMIN — PANTOPRAZOLE SODIUM 40 MILLIGRAM(S): 20 TABLET, DELAYED RELEASE ORAL at 17:21

## 2018-10-20 RX ADMIN — Medication 1000 MILLIGRAM(S): at 05:33

## 2018-10-20 RX ADMIN — Medication 650 MILLIGRAM(S): at 18:20

## 2018-10-20 RX ADMIN — Medication 650 MILLIGRAM(S): at 07:43

## 2018-10-20 RX ADMIN — Medication 1000 MILLIGRAM(S): at 17:21

## 2018-10-20 RX ADMIN — Medication 650 MILLIGRAM(S): at 23:30

## 2018-10-20 RX ADMIN — Medication 650 MILLIGRAM(S): at 17:21

## 2018-10-20 RX ADMIN — Medication 650 MILLIGRAM(S): at 23:07

## 2018-10-20 RX ADMIN — CHLORHEXIDINE GLUCONATE 1 APPLICATION(S): 213 SOLUTION TOPICAL at 11:16

## 2018-10-20 RX ADMIN — Medication 650 MILLIGRAM(S): at 05:35

## 2018-10-20 RX ADMIN — SODIUM CHLORIDE 3 MILLILITER(S): 9 INJECTION INTRAMUSCULAR; INTRAVENOUS; SUBCUTANEOUS at 23:08

## 2018-10-20 RX ADMIN — HEPARIN SODIUM 2000 UNIT(S)/HR: 5000 INJECTION INTRAVENOUS; SUBCUTANEOUS at 03:57

## 2018-10-20 RX ADMIN — Medication 500 MILLIGRAM(S): at 17:21

## 2018-10-20 RX ADMIN — SODIUM CHLORIDE 3 MILLILITER(S): 9 INJECTION INTRAMUSCULAR; INTRAVENOUS; SUBCUTANEOUS at 13:04

## 2018-10-20 RX ADMIN — SODIUM CHLORIDE 3 MILLILITER(S): 9 INJECTION INTRAMUSCULAR; INTRAVENOUS; SUBCUTANEOUS at 05:33

## 2018-10-20 RX ADMIN — LISINOPRIL 2.5 MILLIGRAM(S): 2.5 TABLET ORAL at 05:33

## 2018-10-20 RX ADMIN — Medication 50 MICROGRAM(S): at 05:33

## 2018-10-20 RX ADMIN — Medication 650 MILLIGRAM(S): at 00:34

## 2018-10-20 RX ADMIN — Medication 50 MILLIGRAM(S): at 17:21

## 2018-10-20 RX ADMIN — Medication 100 GRAM(S): at 14:12

## 2018-10-20 NOTE — PROGRESS NOTE ADULT - SUBJECTIVE AND OBJECTIVE BOX
INTERVAL HISTORY: Denies CP, SOB  	  MEDICATIONS:  lisinopril 2.5 milliGRAM(s) Oral daily  metoprolol tartrate 50 milliGRAM(s) Oral two times a day  amoxicillin      Capsule 1000 milliGRAM(s) Oral every 12 hours  clarithromycin 500 milliGRAM(s) Oral every 12 hours  acetaminophen   Tablet .. 650 milliGRAM(s) Oral every 6 hours  ondansetron    Tablet 4 milliGRAM(s) Oral every 6 hours PRN  oxyCODONE    IR 5 milliGRAM(s) Oral every 4 hours PRN  oxyCODONE    IR 10 milliGRAM(s) Oral every 4 hours PRN  bisacodyl Suppository 10 milliGRAM(s) Rectal once PRN  pantoprazole    Tablet 40 milliGRAM(s) Oral two times a day  levothyroxine 50 MICROGram(s) Oral daily  chlorhexidine 2% Cloths 1 Application(s) Topical daily  sodium chloride 0.9% lock flush 3 milliLiter(s) IV Push every 8 hours        PHYSICAL EXAM:  T(C): 36.6 (10-20-18 @ 11:07), Max: 36.8 (10-19-18 @ 21:49)  HR: 95 (10-20-18 @ 11:07) (87 - 97)  BP: 100/65 (10-20-18 @ 11:07) (100/65 - 127/67)  RR: 18 (10-20-18 @ 11:07) (18 - 20)  SpO2: 94% (10-20-18 @ 11:07) (94% - 100%)  Wt(kg): --  I&O's Summary    19 Oct 2018 07:01  -  20 Oct 2018 07:00  --------------------------------------------------------  IN: 248 mL / OUT: 2475 mL / NET: -2227 mL    20 Oct 2018 07:01  -  20 Oct 2018 15:59  --------------------------------------------------------  IN: 100 mL / OUT: 301 mL / NET: -201 mL          Appearance: Normal	  HEENT:   Normal oral mucosa  Cardiovascular: Normal S1 S2, No JVD, No murmurs, No edema  Respiratory: Lungs clear to auscultation	  Psychiatry: A & O x 3, Mood & affect appropriate  Gastrointestinal:  Soft, Non-tender, + BS	  Skin: No rashes, No ecchymoses, No cyanosis  Neurologic: Non-focal  Extremities: Normal range of motion, No clubbing, cyanosis or edema  Vascular: Peripheral pulses palpable 2+ bilaterally	    LABS:	 	                          11.6   5.2   )-----------( 393      ( 20 Oct 2018 08:55 )             38.1     10-20    137  |  96<L>  |  3.0<L>  ----------------------------<  93  4.2   |  30.0<H>  |  0.91    Ca    8.7      20 Oct 2018 08:55  Phos  3.7     10-20  Mg     1.6     10-20      ASSESSMENT/PLAN: 	TAI UMANA is a 58y Male with past medical history significant for morbid obesity and smoking with perforated viscus 2/2 duodenal ulcer, lactic acidosis and TALA,  found to be in rapid atrial fibrillation converted to SR. No CP, SOB, palpitations, PND or orthopnea. No syncope. s/p Respiratory failure. Likely septic shock, lactic acidosis. Bedside Echocardiogram reveals decreased LV systolic fx (30-35%).  No major VHD. Pt was taken to OR and is now s/p open cholecystectomy.   - PAF. CHADSVACs score is only 1 (low).  There is no clearcut indication for long-term AC at the current time, especially in setting of recent surgery and open abdominal wound.  - Given low EF will convert cardizem to Lopressor 50mg q12h. Titrate prn with plan to convert to Toprol  (or higher) at discharge   - TALA has resolved.  Will start Lisinopril 2.5mg daily for afterload reduction.  - Keep K > 4, Mg > 2.   - Low EF. May have been due to sepsis and OCHOA. Would repeat echo as an outpt  - ABX per primary team  - Will pursue repeat echo and ischemic evaluation as an out pt when acute pathology resolved.  Will address strategies for rhythm control as outpatient as well.  - Will follow pt PRN, please call if there is any question regarding Mr Umana.

## 2018-10-20 NOTE — PROVIDER CONTACT NOTE (CRITICAL VALUE NOTIFICATION) - TEST AND RESULT REPORTED:
Lactate 5.9
Digoxin 7.3
Lactate 12.7
Lactate 5.7
Lactate 6.5
Ptt 133.4
Troponin - 0.20
aPTT >200
ptt 149.8

## 2018-10-20 NOTE — PROGRESS NOTE ADULT - SUBJECTIVE AND OBJECTIVE BOX
Patient seen at bedside this morning with no major complaints. Midline abdominal dressings from his ex lap were changed. Wound appears clean, with no purulent discharge and good granulation tissue along the margins. Patient with Afib. Re-evaluated by cardiology - no need for long tern anticoagulation; stopped Cardizem, started lopressor 91o07mm with intent to convert to Toprol  at discharge. Patient currently on CLD, tolerating diet with no nausea nor vomiting. Denies SOB, chest pain, fever and chills.        MEDICATIONS  (STANDING):  acetaminophen   Tablet .. 650 milliGRAM(s) Oral every 6 hours  amoxicillin      Capsule 1000 milliGRAM(s) Oral every 12 hours  chlorhexidine 2% Cloths 1 Application(s) Topical daily  clarithromycin 500 milliGRAM(s) Oral every 12 hours  heparin  Infusion.  Unit(s)/Hr (23 mL/Hr) IV Continuous <Continuous>  levothyroxine 50 MICROGram(s) Oral daily  lisinopril 2.5 milliGRAM(s) Oral daily  metoprolol tartrate 50 milliGRAM(s) Oral two times a day  pantoprazole    Tablet 40 milliGRAM(s) Oral two times a day  sodium chloride 0.9% lock flush 3 milliLiter(s) IV Push every 8 hours    MEDICATIONS  (PRN):  bisacodyl Suppository 10 milliGRAM(s) Rectal once PRN Constipation  oxyCODONE    IR 5 milliGRAM(s) Oral every 4 hours PRN Moderate Pain (4 - 6)  oxyCODONE    IR 10 milliGRAM(s) Oral every 4 hours PRN Severe Pain (7 - 10)      Vital Signs Last 24 Hrs  T(C): 36.8 (19 Oct 2018 21:49), Max: 36.8 (19 Oct 2018 21:49)  T(F): 98.2 (19 Oct 2018 21:49), Max: 98.2 (19 Oct 2018 21:49)  HR: 87 (19 Oct 2018 23:12) (87 - 101)  BP: 105/54 (19 Oct 2018 21:49) (105/54 - 141/75)  BP(mean): --  RR: 18 (19 Oct 2018 21:49) (18 - 20)  SpO2: 100% (19 Oct 2018 23:12) (96% - 100%)    PHYSICAL EXAM:  General: Appears well developed, well nourished, no acute distress  HEENT: Head: normocephalic, atraumatic  Eyes: Pupils equal and reactive  Neck: Supple, no carotid bruit, no JVD, no HJR  CARDIOVASCULAR: Normal S1 and S2, 1/6 systolic murmur  LUNGS: Clear to auscultation bilaterally, no rales, rhonchi or wheeze  ABDOMEN: distended, dressing in place  EXTREMITIES: no to trace edema, chronic skin changes, distal pulses WNL  SKIN: Warm and dry with normal turgor  NEURO: Alert & oriented x 3, grossly intact  PSYCH: normal mood and affect      I&O's Detail    18 Oct 2018 07:01  -  19 Oct 2018 07:00  --------------------------------------------------------  IN:    heparin  Infusion.: 237 mL    Oral Fluid: 440 mL    sodium chloride 0.9%: 150 mL  Total IN: 827 mL    OUT:    Stool: 1 mL    Voided: 1965 mL  Total OUT: 1966 mL    Total NET: -1139 mL      19 Oct 2018 07:01  -  20 Oct 2018 02:59  --------------------------------------------------------  IN:  Total IN: 0 mL    OUT:    Voided: 2000 mL  Total OUT: 2000 mL    Total NET: -2000 mL          LABS:                        11.7   5.4   )-----------( 426      ( 19 Oct 2018 08:22 )             37.2     10-19    135  |  95<L>  |  5.0<L>  ----------------------------<  89  4.6   |  29.0  |  0.93    Ca    8.3<L>      19 Oct 2018 03:22  Phos  2.9     10-19  Mg     1.7     10-19      PTT - ( 20 Oct 2018 02:24 )  PTT:>200.0 sec      RADIOLOGY & ADDITIONAL STUDIES:

## 2018-10-20 NOTE — PROVIDER CONTACT NOTE (CRITICAL VALUE NOTIFICATION) - PERSON GIVING RESULT:
Jermain - LAB
Kofi Broussard
Lab - Tony
Tiffani
Tiffani - Chemistry
Tony / Lab
Yakelin Swan
rose marie chandler
China Raman

## 2018-10-20 NOTE — PROVIDER CONTACT NOTE (CRITICAL VALUE NOTIFICATION) - NAME OF MD/NP/PA/DO NOTIFIED:
Yordy Corona [facility]
Clarence, ACS
DR Lionel Plunkett
Dr Gordon
Dr Gordon
Dr. Gordon
Emigdio LARIOS
Keo [facility]
dr williamson

## 2018-10-20 NOTE — PROGRESS NOTE ADULT - ASSESSMENT
58 year old man s/p Ex La, tara patch and an open cholecystectomy on 10/8 for a perforated DU has resolved his previous ileus and Afib       Plan:  -f/u with cardiology for long term Afib management  - Endo recs: likely hashimoto thyroiditis start Ltd 50 mcg qd, recheck TFTs in 1 week  - monitor bowel function  -cont H Pylor treatment with ppi, amoxi and clarithro until 10/25  -Encourage IS/ambulation  -DVT ppx: heparin  -Wound care instructions, daily.  Wet to dry dressings with krelix, cover with ABD pad. 58 year old man s/p Ex La, tara patch and an open cholecystectomy on 10/8 for a perforated DU has resolved his previous ileus and Afib       Plan:  -f/u with cardiology for long term Afib management  - Endo recs: likely hashimoto thyroiditis start Ltd 50 mcg qd, recheck TFTs in 1 week  - monitor bowel function  -cont H Pylor treatment with ppi, amoxi and clarithro until 10/25  -Encourage IS/ambulation  -Wound care instructions, daily.  Wet to dry dressings with krelix, cover with ABD pad.

## 2018-10-21 ENCOUNTER — TRANSCRIPTION ENCOUNTER (OUTPATIENT)
Age: 58
End: 2018-10-21

## 2018-10-21 LAB
ALBUMIN SERPL ELPH-MCNC: 2.6 G/DL — LOW (ref 3.3–5.2)
ALP SERPL-CCNC: 66 U/L — SIGNIFICANT CHANGE UP (ref 40–120)
ALT FLD-CCNC: 12 U/L — SIGNIFICANT CHANGE UP
ANION GAP SERPL CALC-SCNC: 10 MMOL/L — SIGNIFICANT CHANGE UP (ref 5–17)
AST SERPL-CCNC: 13 U/L — SIGNIFICANT CHANGE UP
BASOPHILS # BLD AUTO: 0 K/UL — SIGNIFICANT CHANGE UP (ref 0–0.2)
BASOPHILS NFR BLD AUTO: 2 % — SIGNIFICANT CHANGE UP (ref 0–2)
BILIRUB SERPL-MCNC: 0.5 MG/DL — SIGNIFICANT CHANGE UP (ref 0.4–2)
BUN SERPL-MCNC: 5 MG/DL — LOW (ref 8–20)
CALCIUM SERPL-MCNC: 8.4 MG/DL — LOW (ref 8.6–10.2)
CHLORIDE SERPL-SCNC: 96 MMOL/L — LOW (ref 98–107)
CO2 SERPL-SCNC: 32 MMOL/L — HIGH (ref 22–29)
CREAT SERPL-MCNC: 0.99 MG/DL — SIGNIFICANT CHANGE UP (ref 0.5–1.3)
CULTURE RESULTS: SIGNIFICANT CHANGE UP
EOSINOPHIL # BLD AUTO: 0.1 K/UL — SIGNIFICANT CHANGE UP (ref 0–0.5)
EOSINOPHIL NFR BLD AUTO: 3 % — SIGNIFICANT CHANGE UP (ref 0–6)
GLUCOSE SERPL-MCNC: 97 MG/DL — SIGNIFICANT CHANGE UP (ref 70–115)
HCT VFR BLD CALC: 37 % — LOW (ref 42–52)
HGB BLD-MCNC: 11.2 G/DL — LOW (ref 14–18)
LYMPHOCYTES # BLD AUTO: 0.9 K/UL — LOW (ref 1–4.8)
LYMPHOCYTES # BLD AUTO: 19 % — LOW (ref 20–55)
MAGNESIUM SERPL-MCNC: 1.9 MG/DL — SIGNIFICANT CHANGE UP (ref 1.6–2.6)
MANUAL DIF COMMENT BLD-IMP: SIGNIFICANT CHANGE UP
MCHC RBC-ENTMCNC: 26.9 PG — LOW (ref 27–31)
MCHC RBC-ENTMCNC: 30.3 G/DL — LOW (ref 32–36)
MCV RBC AUTO: 88.7 FL — SIGNIFICANT CHANGE UP (ref 80–94)
MONOCYTES # BLD AUTO: 0.9 K/UL — HIGH (ref 0–0.8)
MONOCYTES NFR BLD AUTO: 19 % — HIGH (ref 3–10)
NEUTROPHILS # BLD AUTO: 2.4 K/UL — SIGNIFICANT CHANGE UP (ref 1.8–8)
NEUTROPHILS NFR BLD AUTO: 55 % — SIGNIFICANT CHANGE UP (ref 37–73)
PHOSPHATE SERPL-MCNC: 3.8 MG/DL — SIGNIFICANT CHANGE UP (ref 2.4–4.7)
PLAT MORPH BLD: NORMAL — SIGNIFICANT CHANGE UP
PLATELET # BLD AUTO: 391 K/UL — SIGNIFICANT CHANGE UP (ref 150–400)
POTASSIUM SERPL-MCNC: 4.2 MMOL/L — SIGNIFICANT CHANGE UP (ref 3.5–5.3)
POTASSIUM SERPL-SCNC: 4.2 MMOL/L — SIGNIFICANT CHANGE UP (ref 3.5–5.3)
PROT SERPL-MCNC: 5.7 G/DL — LOW (ref 6.6–8.7)
RBC # BLD: 4.17 M/UL — LOW (ref 4.6–6.2)
RBC # FLD: 17.3 % — HIGH (ref 11–15.6)
RBC BLD AUTO: SIGNIFICANT CHANGE UP
SODIUM SERPL-SCNC: 138 MMOL/L — SIGNIFICANT CHANGE UP (ref 135–145)
SPECIMEN SOURCE: SIGNIFICANT CHANGE UP
VARIANT LYMPHS # BLD: 2 % — SIGNIFICANT CHANGE UP (ref 0–6)
WBC # BLD: 4.4 K/UL — LOW (ref 4.8–10.8)
WBC # FLD AUTO: 4.4 K/UL — LOW (ref 4.8–10.8)

## 2018-10-21 RX ORDER — CLARITHROMYCIN 500 MG
1 TABLET ORAL
Qty: 0 | Refills: 0 | COMMUNITY
Start: 2018-10-21 | End: 2018-10-25

## 2018-10-21 RX ORDER — LISINOPRIL 2.5 MG/1
1 TABLET ORAL
Qty: 0 | Refills: 0 | COMMUNITY
Start: 2018-10-21

## 2018-10-21 RX ORDER — HEPARIN SODIUM 5000 [USP'U]/ML
7500 INJECTION INTRAVENOUS; SUBCUTANEOUS EVERY 8 HOURS
Qty: 0 | Refills: 0 | Status: DISCONTINUED | OUTPATIENT
Start: 2018-10-21 | End: 2018-10-24

## 2018-10-21 RX ORDER — OXYCODONE HYDROCHLORIDE 5 MG/1
1 TABLET ORAL
Qty: 0 | Refills: 0 | COMMUNITY
Start: 2018-10-21

## 2018-10-21 RX ORDER — METOPROLOL TARTRATE 50 MG
1 TABLET ORAL
Qty: 0 | Refills: 0 | COMMUNITY
Start: 2018-10-21

## 2018-10-21 RX ORDER — PANTOPRAZOLE SODIUM 20 MG/1
1 TABLET, DELAYED RELEASE ORAL
Qty: 0 | Refills: 0 | COMMUNITY
Start: 2018-10-21 | End: 2018-10-25

## 2018-10-21 RX ORDER — ACETAMINOPHEN 500 MG
2 TABLET ORAL
Qty: 0 | Refills: 0 | COMMUNITY
Start: 2018-10-21

## 2018-10-21 RX ORDER — HEPARIN SODIUM 5000 [USP'U]/ML
7500 INJECTION INTRAVENOUS; SUBCUTANEOUS
Qty: 0 | Refills: 0 | COMMUNITY
Start: 2018-10-21

## 2018-10-21 RX ORDER — LEVOTHYROXINE SODIUM 125 MCG
1 TABLET ORAL
Qty: 0 | Refills: 0 | COMMUNITY
Start: 2018-10-21

## 2018-10-21 RX ORDER — AMOXICILLIN 250 MG/5ML
2 SUSPENSION, RECONSTITUTED, ORAL (ML) ORAL
Qty: 0 | Refills: 0 | COMMUNITY
Start: 2018-10-21 | End: 2018-10-25

## 2018-10-21 RX ADMIN — Medication 650 MILLIGRAM(S): at 23:34

## 2018-10-21 RX ADMIN — Medication 50 MILLIGRAM(S): at 06:05

## 2018-10-21 RX ADMIN — Medication 50 MICROGRAM(S): at 06:01

## 2018-10-21 RX ADMIN — Medication 500 MILLIGRAM(S): at 06:01

## 2018-10-21 RX ADMIN — Medication 650 MILLIGRAM(S): at 06:01

## 2018-10-21 RX ADMIN — HEPARIN SODIUM 7500 UNIT(S): 5000 INJECTION INTRAVENOUS; SUBCUTANEOUS at 22:28

## 2018-10-21 RX ADMIN — PANTOPRAZOLE SODIUM 40 MILLIGRAM(S): 20 TABLET, DELAYED RELEASE ORAL at 17:17

## 2018-10-21 RX ADMIN — Medication 1000 MILLIGRAM(S): at 17:18

## 2018-10-21 RX ADMIN — CHLORHEXIDINE GLUCONATE 1 APPLICATION(S): 213 SOLUTION TOPICAL at 11:54

## 2018-10-21 RX ADMIN — Medication 500 MILLIGRAM(S): at 17:18

## 2018-10-21 RX ADMIN — PANTOPRAZOLE SODIUM 40 MILLIGRAM(S): 20 TABLET, DELAYED RELEASE ORAL at 06:05

## 2018-10-21 RX ADMIN — SODIUM CHLORIDE 3 MILLILITER(S): 9 INJECTION INTRAMUSCULAR; INTRAVENOUS; SUBCUTANEOUS at 12:01

## 2018-10-21 RX ADMIN — OXYCODONE HYDROCHLORIDE 10 MILLIGRAM(S): 5 TABLET ORAL at 03:49

## 2018-10-21 RX ADMIN — OXYCODONE HYDROCHLORIDE 10 MILLIGRAM(S): 5 TABLET ORAL at 04:15

## 2018-10-21 RX ADMIN — Medication 650 MILLIGRAM(S): at 06:31

## 2018-10-21 RX ADMIN — LISINOPRIL 2.5 MILLIGRAM(S): 2.5 TABLET ORAL at 06:02

## 2018-10-21 RX ADMIN — SODIUM CHLORIDE 3 MILLILITER(S): 9 INJECTION INTRAMUSCULAR; INTRAVENOUS; SUBCUTANEOUS at 22:32

## 2018-10-21 RX ADMIN — SODIUM CHLORIDE 3 MILLILITER(S): 9 INJECTION INTRAMUSCULAR; INTRAVENOUS; SUBCUTANEOUS at 06:08

## 2018-10-21 RX ADMIN — Medication 650 MILLIGRAM(S): at 13:44

## 2018-10-21 RX ADMIN — Medication 1000 MILLIGRAM(S): at 06:05

## 2018-10-21 RX ADMIN — HEPARIN SODIUM 7500 UNIT(S): 5000 INJECTION INTRAVENOUS; SUBCUTANEOUS at 12:01

## 2018-10-21 RX ADMIN — Medication 50 MILLIGRAM(S): at 17:17

## 2018-10-21 RX ADMIN — Medication 650 MILLIGRAM(S): at 17:17

## 2018-10-21 RX ADMIN — Medication 650 MILLIGRAM(S): at 11:54

## 2018-10-21 NOTE — DISCHARGE NOTE ADULT - PROVIDER TOKENS
TOKEN:'[id number]',FREE:[LAST:[[facility]],PHONE:[phone]:[(   )    -],ADDRESS:[[address] [place] [address]]] TOKEN:'[id number]',FREE:[LAST:[[facility]],PHONE:[phone]:[(   )    -],ADDRESS:[[address] [place] [address]]],TOKEN:'[id number]'

## 2018-10-21 NOTE — DISCHARGE NOTE ADULT - CARE PROVIDER_API CALL
Mike Parson), Cardiovascular Disease; Internal Medicine  [address]  [address]  [place] [address]  Phone: [phone]  Fax: [phone]    [facility],   [facility] [address] - [address] [place] [address]  Phone: [phone]  Fax: (   )    - Mike Parson (MD), Cardiovascular Disease; Internal Medicine  [address]  [address]  [place] [address]  Phone: [phone]  Fax: [phone]    [facility],   [facility] [address] [place] [address]  Phone: [phone]  Fax: (   )    Kaley Arnold), EndocrinologyMetabDiabetes  [address], [place] [id number]  Phone: [phone]  Fax: [phone]

## 2018-10-21 NOTE — DISCHARGE NOTE ADULT - PATIENT PORTAL LINK FT
You can access the [facility] Patient Portal, offered by [facility], by registering with the following website: [email or web address]

## 2018-10-21 NOTE — DISCHARGE NOTE ADULT - HOSPITAL COURSE
57 y/o M with no medical care and unknown medical problems presents to ED with dyspnea and vague abdominal pain. Patient states for the past week he has noticed worsening exertional dyspnea, and now has dyspnea at rest. No reported chest pain. Patient also states that about 3 days ago he started noticing generalized abdominal pain that shifted to the left abdomen today. Pain is described as dull ache, that is constant in nature. No provoking or alleviating factors. Associated nausea but no episodes of vomiting. No reported fever or chills. Last bowel movement 2 days ago. Passing flatus normally. Decreased appetite over the past month.     10/8: Patient taken emergently to the OR: ex-lap, tara patch, open cholecystectomy. Intraoperative findings: perforated duodenal ulcer measuring 8 mm repaired with tara patch, succus throughout abdomen, inflamed gallbladder. Urology consulted for catheterization as pt has phimosis. Hypotensive after induction, no danni, 22g IV in hand; surgeon called SICU PA into case, emergent R SC TLC (no cortis available), R ax danni placed intraop. Stopped propofol, dobutamine, Cardizem drips. 2u FFP given for INR 2. Additional 1L IVF bolus given post op. LA down to 3.4, acidosis resolved by morning, levo dose down considerably, UOP picked up. Dirty SC line d/c’d  10/9 IVF decreased, off Levo.  Extubated.  ON:  Creat on rise 2.05à2.31à2.53 with decreasing UOP.  BP soft with worsening Afib with RVR.  Urine lytes sent.  Pre-renal.  No abnormal casts on microscopy.  Received albumin x 2 with mild improvement.  CVO2: 54.  Lactic 2.6 - 2.9.  Received additional 1 L IVF.  Repeat lactic cleared.  CVO2: 59.  Now rate controlled with volume.  Creat peaked at 2.50.  Lopressor caused hypotension prior to volume   10/10: Heparin drip started. Zosyn stays. Cards does not feel RUBEN worth risk and will likely convert back to A-fib in acute settings. ON no events, DIG elevated, dig 7.3 dced resume when indicated, PTT at go kept at 20U, h pylori positive  10/11: Written to floor. Dig stopped.    When patient arrived to the floor diet was gradually advanced as bowel function returned. Khan d/c'ed on 10/13 and pt able to pass TOV. Pt was noted to have become increasingly distended with decreased appetite on 10/15. NGT placed. Pt taken for paracentesis by IR on 10/16 - tolerated procedure well. Endocrinology consulted after TSH found to be elevated. Started on synthroid. NGT able to be d/c'ed once bowel fxn returned & abd distension improved. Diet gradually advanced for which he has been tolerating well. Hospital course complicated by rapid afib. Cardiology stated no indication for long term AC. Medication adjustments made for better rate control. Physical therapy worked with patient during admission. He is HD well, tolerating diet, pain controlled on PO medications, OOB with assistance, voiding, and having continued bowel fxn. Stable for d/c at this time.    Patient is advised to RETURN TO THE EMERGENCY DEPARTMENT for any of the following - worsening pain, fever/chills, nausea/vomiting, altered mental status, chest pain, shortness of breath, or any other new / worsening symptom. 57 y/o M with no medical care and unknown medical problems presents to ED with dyspnea and vague abdominal pain. Patient states for the past week he has noticed worsening exertional dyspnea, and now has dyspnea at rest. No reported chest pain. Patient also states that about 3 days ago he started noticing generalized abdominal pain that shifted to the left abdomen today. Pain is described as dull ache, that is constant in nature. No provoking or alleviating factors. Associated nausea but no episodes of vomiting. No reported fever or chills. Last bowel movement 2 days ago. Passing flatus normally. Decreased appetite over the past month.     10/8: Patient taken emergently to the OR: ex-lap, tara patch, open cholecystectomy. Intraoperative findings: perforated duodenal ulcer measuring 8 mm repaired with tara patch, succus throughout abdomen, inflamed gallbladder. Urology consulted for catheterization as pt has phimosis. Hypotensive after induction, no danni, 22g IV in hand; surgeon called SICU PA into case, emergent R SC TLC (no cortis available), R ax danni placed intraop. Stopped propofol, dobutamine, Cardizem drips. 2u FFP given for INR 2. Additional 1L IVF bolus given post op. LA down to 3.4, acidosis resolved by morning, levo dose down considerably, UOP picked up. Dirty SC line d/c’d  10/9 IVF decreased, off Levo.  Extubated.  ON:  Creat on rise 2.05à2.31à2.53 with decreasing UOP.  BP soft with worsening Afib with RVR.  Urine lytes sent.  Pre-renal.  No abnormal casts on microscopy.  Received albumin x 2 with mild improvement.  CVO2: 54.  Lactic 2.6 - 2.9.  Received additional 1 L IVF.  Repeat lactic cleared.  CVO2: 59.  Now rate controlled with volume.  Creat peaked at 2.50.  Lopressor caused hypotension prior to volume   10/10: Heparin drip started. Zosyn stays. Cards does not feel RUBEN worth risk and will likely convert back to A-fib in acute settings. ON no events, DIG elevated, dig 7.3 dced resume when indicated, PTT at go kept at 20U, h pylori positive  10/11: Written to floor. Dig stopped.    When patient arrived to the floor diet was gradually advanced as bowel function returned. Khan d/c'ed on 10/13 and pt able to pass TOV. Pt was noted to have become increasingly distended with decreased appetite on 10/15. NGT placed. Pt taken for paracentesis by IR on 10/16 - tolerated procedure well. Endocrinology consulted after TSH found to be elevated. Started on synthroid. NGT able to be d/c'ed once bowel fxn returned & abd distension improved. Diet gradually advanced for which he has been tolerating well. Hospital course complicated by rapid afib. Cardiology stated no indication for long term AC. Medication adjustments made for better rate control. Physical therapy & physiatry worked with patient during admission. He is HD well, tolerating diet, pain controlled on PO medications, OOB with assistance, voiding, and having continued bowel fxn. Stable for d/c at this time.    Patient is advised to RETURN TO THE EMERGENCY DEPARTMENT for any of the following - worsening pain, fever/chills, nausea/vomiting, altered mental status, chest pain, shortness of breath, or any other new / worsening symptom.

## 2018-10-21 NOTE — PROGRESS NOTE ADULT - ASSESSMENT
58yoM s/p ex-lap, tara patch and open choley on 10/8 for perforated duodenal ulcer with post-op ileus and a.fib. Rate controlled. No anticoagulation for now, per cards as his CHADSVACs score is low.   - lopressor 50mg BID, with plan to convert to toprol xL 100 at d/c  - start lisinopril 2.5mg Qday  - repeat echo as an outpt  - continue abx  - continue daily dressing change  - encourage oob and is use  - regular diet  - dvt ppx 58yoM s/p ex-lap, tara patch and open choley on 10/8 for perforated duodenal ulcer with post-op ileus and a.fib. Rate controlled. No anticoagulation for now, per cards as his CHADSVACs score is low.   - lopressor 50mg BID, with plan to convert to toprol xL 100 at d/c  - start lisinopril 2.5mg Qday  - repeat echo as an outpt  - continue abx  - continue daily dressing change  - encourage oob and is use  - regular diet  - dvt ppx  - dispo: pending RICK

## 2018-10-21 NOTE — DISCHARGE NOTE ADULT - CARE PLAN
Principal Discharge DX:	Perforated viscus  Goal:	Alleviation of pain and symptoms  Assessment and plan of treatment:	Follow up: Please call and make an appointment with the [facility] 10-14 days after discharge. Also, please call and make an appointment with your primary care physician as per your usual schedule.     Activity: May return to normal activities as tolerated, however refrain from heavy lifting > 10-15 lbs.    Diet: May continue regular diet.    Medications: Please take all home medications as prescribed by your primary care doctor. Pain medication has been prescribed for you. Please, take it as it has been prescribed, do not drive or operate heavy machinery while taking narcotics.     Wound Care: Please, keep wound site clean and dry. You may shower, but do not bathe. ***DAILY WOUND CARE INSTRUCTIONS: remove existing dressing and shower, allowing warm soapy water to run over wound. Pat dry with clean towel. Place dampened kerlex (wet to dry with either normal saline or sterile water) in midline abdominal incision and cover with abdominal pad. Secure in place with tape. Please perform this daily ***    Patient is advised to RETURN TO THE EMERGENCY DEPARTMENT for any of the following - worsening pain, fever/chills, nausea/vomiting, altered mental status, chest pain, shortness of breath, or any other new / worsening symptom.  Secondary Diagnosis:	Atrial fibrillation with RVR  Assessment and plan of treatment:	Please continue medications as listed above and follow-up with the cardiologists 1-2 weeks after discharge for further management - contact information has been provided below. Principal Discharge DX:	Perforated viscus  Goal:	Alleviation of pain and symptoms  Assessment and plan of treatment:	Follow up: Please call and make an appointment with the [facility] 10-14 days after discharge. Also, please call and make an appointment with your primary care physician as per your usual schedule.     Activity: May return to normal activities as tolerated, however refrain from heavy lifting > 10-15 lbs.    Diet: May continue regular diet.    Medications: Please take all home medications as prescribed by your primary care doctor. Pain medication has been prescribed for you. Please, take it as it has been prescribed, do not drive or operate heavy machinery while taking narcotics.     Wound Care: Please, keep wound site clean and dry. You may shower, but do not bathe. ***DAILY WOUND CARE INSTRUCTIONS: remove existing dressing and shower, allowing warm soapy water to run over wound. Pat dry with clean towel. Place dampened kerlex (wet to dry with either normal saline or sterile water) in midline abdominal incision and cover with abdominal pad. Secure in place with tape. Please perform this daily ***    Patient is advised to RETURN TO THE EMERGENCY DEPARTMENT for any of the following - worsening pain, fever/chills, nausea/vomiting, altered mental status, chest pain, shortness of breath, or any other new / worsening symptom.  Secondary Diagnosis:	Atrial fibrillation with RVR  Assessment and plan of treatment:	During your hospital stay you were found to have an irregular heart rhythm called ATRIAL FIBRILATION. The cardiologists who saw you while in the hospital started you on new medications to help better control your heart rate. Please continue medications as listed above and follow-up with the cardiologists 1-2 weeks after discharge for further management - contact information has been provided below.  Secondary Diagnosis:	Hypothyroidism  Assessment and plan of treatment:	Please continue SYNTHROID as listed above, and follow-up with the endocrinologist 1-2 weeks after discharge for further management - contact information has been provided below.

## 2018-10-21 NOTE — PROGRESS NOTE ADULT - SUBJECTIVE AND OBJECTIVE BOX
HPI/OVERNIGHT EVENTS:  No acute events overnight. Still in A.fib, but rate controlled with HR in the 's. No cardiazem pushes required ON. No complaints. Pain controlled. Ambulating. Tolerating a diet. Denies f/c/n/v/cp/sob      MEDICATIONS  (STANDING):  acetaminophen   Tablet .. 650 milliGRAM(s) Oral every 6 hours  amoxicillin      Capsule 1000 milliGRAM(s) Oral every 12 hours  chlorhexidine 2% Cloths 1 Application(s) Topical daily  clarithromycin 500 milliGRAM(s) Oral every 12 hours  heparin  Injectable 7500 Unit(s) SubCutaneous every 8 hours  levothyroxine 50 MICROGram(s) Oral daily  lisinopril 2.5 milliGRAM(s) Oral daily  metoprolol tartrate 50 milliGRAM(s) Oral two times a day  pantoprazole    Tablet 40 milliGRAM(s) Oral two times a day  sodium chloride 0.9% lock flush 3 milliLiter(s) IV Push every 8 hours    MEDICATIONS  (PRN):  bisacodyl Suppository 10 milliGRAM(s) Rectal once PRN Constipation  ondansetron    Tablet 4 milliGRAM(s) Oral every 6 hours PRN Nausea and/or Vomiting  oxyCODONE    IR 5 milliGRAM(s) Oral every 4 hours PRN Moderate Pain (4 - 6)  oxyCODONE    IR 10 milliGRAM(s) Oral every 4 hours PRN Severe Pain (7 - 10)      Vital Signs Last 24 Hrs  T(C): 36.4 (21 Oct 2018 10:19), Max: 36.6 (20 Oct 2018 16:12)  T(F): 97.5 (21 Oct 2018 10:19), Max: 97.8 (20 Oct 2018 16:12)  HR: 93 (21 Oct 2018 10:19) (81 - 102)  BP: 106/60 (21 Oct 2018 10:19) (101/57 - 115/60)  BP(mean): --  RR: 20 (21 Oct 2018 10:19) (18 - 20)  SpO2: 94% (21 Oct 2018 05:55) (94% - 97%)    gen: obese, nad, wdwn  cv: irregularly irregular  resp: nonlabored breathing  gi: soft, nd, nttp. midline wound with dressing in place, c/d/i. Wound itself is healing well with good granulation tissue.  msk: no c/c/e.      I&O's Detail    20 Oct 2018 07:01  -  21 Oct 2018 07:00  --------------------------------------------------------  IN:    Solution: 100 mL  Total IN: 100 mL    OUT:    Stool: 2 mL    Voided: 900 mL  Total OUT: 902 mL    Total NET: -802 mL      21 Oct 2018 07:01  -  21 Oct 2018 15:33  --------------------------------------------------------  IN:  Total IN: 0 mL    OUT:    Voided: 450 mL  Total OUT: 450 mL    Total NET: -450 mL          LABS:                        11.2   4.4   )-----------( 391      ( 21 Oct 2018 08:11 )             37.0     10-21    138  |  96<L>  |  5.0<L>  ----------------------------<  97  4.2   |  32.0<H>  |  0.99    Ca    8.4<L>      21 Oct 2018 08:11  Phos  3.8     10-21  Mg     1.9     10-21    TPro  5.7<L>  /  Alb  2.6<L>  /  TBili  0.5  /  DBili  x   /  AST  13  /  ALT  12  /  AlkPhos  66  10-21    PTT - ( 20 Oct 2018 11:59 )  PTT:30.0 sec

## 2018-10-21 NOTE — DISCHARGE NOTE ADULT - PLAN OF CARE
Alleviation of pain and symptoms Follow up: Please call and make an appointment with the [facility] 10-14 days after discharge. Also, please call and make an appointment with your primary care physician as per your usual schedule.     Activity: May return to normal activities as tolerated, however refrain from heavy lifting > 10-15 lbs.    Diet: May continue regular diet.    Medications: Please take all home medications as prescribed by your primary care doctor. Pain medication has been prescribed for you. Please, take it as it has been prescribed, do not drive or operate heavy machinery while taking narcotics.     Wound Care: Please, keep wound site clean and dry. You may shower, but do not bathe. ***DAILY WOUND CARE INSTRUCTIONS: remove existing dressing and shower, allowing warm soapy water to run over wound. Pat dry with clean towel. Place dampened kerlex (wet to dry with either normal saline or sterile water) in midline abdominal incision and cover with abdominal pad. Secure in place with tape. Please perform this daily ***    Patient is advised to RETURN TO THE EMERGENCY DEPARTMENT for any of the following - worsening pain, fever/chills, nausea/vomiting, altered mental status, chest pain, shortness of breath, or any other new / worsening symptom. Please continue medications as listed above and follow-up with the cardiologists 1-2 weeks after discharge for further management - contact information has been provided below. During your hospital stay you were found to have an irregular heart rhythm called ATRIAL FIBRILATION. The cardiologists who saw you while in the hospital started you on new medications to help better control your heart rate. Please continue medications as listed above and follow-up with the cardiologists 1-2 weeks after discharge for further management - contact information has been provided below. Please continue SYNTHROID as listed above, and follow-up with the endocrinologist 1-2 weeks after discharge for further management - contact information has been provided below.

## 2018-10-21 NOTE — DISCHARGE NOTE ADULT - MEDICATION SUMMARY - MEDICATIONS TO TAKE
I will START or STAY ON the medications listed below when I get home from the hospital:    acetaminophen 325 mg oral tablet  -- 2 tab(s) by mouth every 6 hours  -- Indication: For Pain    oxyCODONE 5 mg oral tablet  -- 1 tab(s) by mouth every 4 hours, As needed, Moderate Pain (4 - 6)  -- Indication: For Pain    oxyCODONE 10 mg oral tablet  -- 1 tab(s) by mouth every 4 hours, As needed, Severe Pain (7 - 10)  -- Indication: For Pain    lisinopril 2.5 mg oral tablet  -- 1 tab(s) by mouth once a day  -- Indication: For HTN    heparin  -- 7500 unit(s) subcutaneous every 8 hours for DVT ppx. This may be discontinued at the discrestion of providers at rehab when patient is OOB and more mobile.  -- Indication: For DVT ppx    metoprolol tartrate 50 mg oral tablet  -- 1 tab(s) by mouth 2 times a day  -- Indication: For Atrial fibrillation    clarithromycin 500 mg oral tablet  -- 1 tab(s) by mouth every 12 hours  -- Indication: For Perforated ulcer (end 10/25/18)    amoxicillin 500 mg oral capsule  -- 2 cap(s) by mouth every 12 hours  -- Indication: For Perforated ulcer (end 10/25/18)    pantoprazole 40 mg oral delayed release tablet  -- 1 tab(s) by mouth 2 times a day  -- Indication: For Perforated ulcer (end 10/25/18)    levothyroxine 50 mcg (0.05 mg) oral tablet  -- 1 tab(s) by mouth once a day  -- Indication: For Hypothyroidism I will START or STAY ON the medications listed below when I get home from the hospital:    acetaminophen 325 mg oral tablet  -- 2 tab(s) by mouth every 6 hours  -- Indication: For Pain    oxyCODONE 5 mg oral tablet  -- 1 tab(s) by mouth every 4 hours, As needed, Moderate to severe pain MDD:5  -- Indication: For Pain    lisinopril 2.5 mg oral tablet  -- 1 tab(s) by mouth once a day  -- Indication: For HTN    metoprolol succinate 100 mg oral tablet, extended release  -- 1 tab(s) by mouth once a day  -- Indication: For HTN, afib    bacitracin 500 units/g topical ointment  -- 1 application on skin 2 times a day  -- Indication: For wound care    clarithromycin 500 mg oral tablet  -- 1 tab(s) by mouth every 12 hours  -- Indication: For Perforated ulcer (end 10/25/18)    amoxicillin 500 mg oral capsule  -- 2 cap(s) by mouth every 12 hours  -- Indication: For Perforated ulcer (end 10/25/18)    pantoprazole 40 mg oral delayed release tablet  -- 1 tab(s) by mouth 2 times a day  -- Indication: For Perforated ulcer (end 10/25/18)    levothyroxine 50 mcg (0.05 mg) oral tablet  -- 1 tab(s) by mouth once a day  -- Indication: For Hypothyroidism

## 2018-10-22 RX ORDER — METOPROLOL TARTRATE 50 MG
1 TABLET ORAL
Qty: 0 | Refills: 0 | COMMUNITY
Start: 2018-10-22

## 2018-10-22 RX ORDER — BACITRACIN ZINC 500 UNIT/G
1 OINTMENT IN PACKET (EA) TOPICAL
Qty: 0 | Refills: 0 | Status: DISCONTINUED | OUTPATIENT
Start: 2018-10-22 | End: 2018-10-24

## 2018-10-22 RX ORDER — BACITRACIN ZINC 500 UNIT/G
1 OINTMENT IN PACKET (EA) TOPICAL
Qty: 0 | Refills: 0 | COMMUNITY
Start: 2018-10-22

## 2018-10-22 RX ORDER — METOPROLOL TARTRATE 50 MG
100 TABLET ORAL DAILY
Qty: 0 | Refills: 0 | Status: DISCONTINUED | OUTPATIENT
Start: 2018-10-22 | End: 2018-10-24

## 2018-10-22 RX ORDER — OXYCODONE HYDROCHLORIDE 5 MG/1
1 TABLET ORAL
Qty: 10 | Refills: 0 | OUTPATIENT
Start: 2018-10-22

## 2018-10-22 RX ORDER — METOPROLOL TARTRATE 50 MG
100 TABLET ORAL DAILY
Qty: 0 | Refills: 0 | Status: DISCONTINUED | OUTPATIENT
Start: 2018-10-22 | End: 2018-10-22

## 2018-10-22 RX ADMIN — Medication 1000 MILLIGRAM(S): at 05:55

## 2018-10-22 RX ADMIN — Medication 650 MILLIGRAM(S): at 17:45

## 2018-10-22 RX ADMIN — Medication 1000 MILLIGRAM(S): at 17:11

## 2018-10-22 RX ADMIN — SODIUM CHLORIDE 3 MILLILITER(S): 9 INJECTION INTRAMUSCULAR; INTRAVENOUS; SUBCUTANEOUS at 13:20

## 2018-10-22 RX ADMIN — Medication 100 MILLIGRAM(S): at 13:17

## 2018-10-22 RX ADMIN — HEPARIN SODIUM 7500 UNIT(S): 5000 INJECTION INTRAVENOUS; SUBCUTANEOUS at 05:56

## 2018-10-22 RX ADMIN — HEPARIN SODIUM 7500 UNIT(S): 5000 INJECTION INTRAVENOUS; SUBCUTANEOUS at 23:05

## 2018-10-22 RX ADMIN — SODIUM CHLORIDE 3 MILLILITER(S): 9 INJECTION INTRAMUSCULAR; INTRAVENOUS; SUBCUTANEOUS at 23:19

## 2018-10-22 RX ADMIN — Medication 650 MILLIGRAM(S): at 23:05

## 2018-10-22 RX ADMIN — Medication 50 MICROGRAM(S): at 05:55

## 2018-10-22 RX ADMIN — HEPARIN SODIUM 7500 UNIT(S): 5000 INJECTION INTRAVENOUS; SUBCUTANEOUS at 13:20

## 2018-10-22 RX ADMIN — Medication 500 MILLIGRAM(S): at 17:10

## 2018-10-22 RX ADMIN — PANTOPRAZOLE SODIUM 40 MILLIGRAM(S): 20 TABLET, DELAYED RELEASE ORAL at 05:55

## 2018-10-22 RX ADMIN — Medication 650 MILLIGRAM(S): at 06:25

## 2018-10-22 RX ADMIN — Medication 650 MILLIGRAM(S): at 00:00

## 2018-10-22 RX ADMIN — Medication 650 MILLIGRAM(S): at 12:23

## 2018-10-22 RX ADMIN — PANTOPRAZOLE SODIUM 40 MILLIGRAM(S): 20 TABLET, DELAYED RELEASE ORAL at 17:10

## 2018-10-22 RX ADMIN — SODIUM CHLORIDE 3 MILLILITER(S): 9 INJECTION INTRAMUSCULAR; INTRAVENOUS; SUBCUTANEOUS at 07:21

## 2018-10-22 RX ADMIN — Medication 650 MILLIGRAM(S): at 11:48

## 2018-10-22 RX ADMIN — Medication 50 MILLIGRAM(S): at 05:56

## 2018-10-22 RX ADMIN — Medication 650 MILLIGRAM(S): at 05:55

## 2018-10-22 RX ADMIN — LISINOPRIL 2.5 MILLIGRAM(S): 2.5 TABLET ORAL at 05:56

## 2018-10-22 RX ADMIN — Medication 500 MILLIGRAM(S): at 05:55

## 2018-10-22 RX ADMIN — Medication 650 MILLIGRAM(S): at 17:10

## 2018-10-22 RX ADMIN — CHLORHEXIDINE GLUCONATE 1 APPLICATION(S): 213 SOLUTION TOPICAL at 11:48

## 2018-10-22 NOTE — PROGRESS NOTE ADULT - ASSESSMENT
58yoM s/p ex-lap, tara patch and open choley on 10/8 for perforated duodenal ulcer with post-op ileus and AFib.  Rate controlled. No anticoagulation for now, per cards as his CHADSVACs score is low.     - lopressor 50mg BID converted to toprol xL 100 in anticipation of discharge.  - Rehab consult placed, home vs rehab for dispo  - start lisinopril 2.5mg Qday  - repeat echo as an outpt  - continue abx for hpylori treatment until 10/25  - continue daily dressing change  - encourage oob and is use  - regular diet  - dvt ppx  - dispo: PT recommends [facility], awaiting rehab recommendation

## 2018-10-22 NOTE — PROGRESS NOTE ADULT - SUBJECTIVE AND OBJECTIVE BOX
No acute events over night. Patient switched from Lopressor to Toprol 100 mg per cardiology recommednation in anticipation of discharge today. HR . No complaints at this time. Denies chest pain, shortness of breath. Several bowel movements over the weekend. +flatus. Pain well controlled. Continues to ambulate. Dressing changed this morning. Dispo planning pending rehab recommendations.     MEDICATIONS  (STANDING):  acetaminophen   Tablet .. 650 milliGRAM(s) Oral every 6 hours  amoxicillin      Capsule 1000 milliGRAM(s) Oral every 12 hours  BACItracin   Ointment 1 Application(s) Topical two times a day  chlorhexidine 2% Cloths 1 Application(s) Topical daily  clarithromycin 500 milliGRAM(s) Oral every 12 hours  heparin  Injectable 7500 Unit(s) SubCutaneous every 8 hours  levothyroxine 50 MICROGram(s) Oral daily  lisinopril 2.5 milliGRAM(s) Oral daily  metoprolol succinate  milliGRAM(s) Oral daily  pantoprazole    Tablet 40 milliGRAM(s) Oral two times a day  sodium chloride 0.9% lock flush 3 milliLiter(s) IV Push every 8 hours    MEDICATIONS  (PRN):  bisacodyl Suppository 10 milliGRAM(s) Rectal once PRN Constipation  ondansetron    Tablet 4 milliGRAM(s) Oral every 6 hours PRN Nausea and/or Vomiting  oxyCODONE    IR 5 milliGRAM(s) Oral every 4 hours PRN Moderate Pain (4 - 6)  oxyCODONE    IR 10 milliGRAM(s) Oral every 4 hours PRN Severe Pain (7 - 10)      Vital Signs Last 24 Hrs  T(C): 36.2 (22 Oct 2018 11:00), Max: 36.9 (21 Oct 2018 19:45)  T(F): 97.1 (22 Oct 2018 11:00), Max: 98.5 (21 Oct 2018 19:45)  HR: 117 (22 Oct 2018 13:13) (88 - 117)  BP: 90/58 (22 Oct 2018 13:13) (90/58 - 118/78)  BP(mean): --  RR: 20 (22 Oct 2018 11:00) (19 - 20)  SpO2: 91% (22 Oct 2018 03:38) (91% - 99%)    PE  cv: irregularly irregular,   resp: nonlabored breathing  gi: soft, nd, nttp. midline wound with dressing in place, c/d/i. Wound itself is healing well with good granulation tissue.  msk: no c/c/e.      I&O's Detail    21 Oct 2018 07:01  -  22 Oct 2018 07:00  --------------------------------------------------------  IN:  Total IN: 0 mL    OUT:    Stool: 1 mL    Voided: 1840 mL  Total OUT: 1841 mL    Total NET: -1841 mL          LABS:                        11.2   4.4   )-----------( 391      ( 21 Oct 2018 08:11 )             37.0     10-21    138  |  96<L>  |  5.0<L>  ----------------------------<  97  4.2   |  32.0<H>  |  0.99    Ca    8.4<L>      21 Oct 2018 08:11  Phos  3.8     10-21  Mg     1.9     10-21    TPro  5.7<L>  /  Alb  2.6<L>  /  TBili  0.5  /  DBili  x   /  AST  13  /  ALT  12  /  AlkPhos  66  10-21          RADIOLOGY & ADDITIONAL STUDIES:

## 2018-10-22 NOTE — CONSULT NOTE ADULT - ATTENDING COMMENTS
Patient seen and examined. He was admitted for abdominal pain and found to have perforated duodenal ulcer s/p exploratory lap. for repair and open cholecystectomy. Post-operatively noted to have ileus and new onset atrial fibrillation. Patient has progressed well with bedside rehabilitation services and would benefit from continued rehabilitation services for functional, gait, balance, stairs, ADL impairments at a subacute rehabilitation facility. Patient was seen on 1023.  Agree with the resident and fellow's recommendations.  Patient is currently ambulating back and forth to the bathroom on own with RW.   Based on the diagnosis and current functional level, recommend [facility]. Does not meet AR criteria.   Continue bedside therapy as well as OOB throughout the day with mobilization by staff to maintain cardiopulmonary function and prevention of secondary complications related to debility.

## 2018-10-22 NOTE — CONSULT NOTE ADULT - SUBJECTIVE AND OBJECTIVE BOX
HPI:  Mr. Umana is a 58 year old male with no medical care who presented to [facility] on 10/8 with complaints of dyspnea and vague abdominal pain x 1 week prior to admission. Pain was described as dull ache, that is constant in nature with no provoking or alleviating factors. Also had associated nausea. He was found to have perforated duodenal ulcer and underwent exploratory laparotomy for repair and open cholecystectomy on 10/8. Hospital course notable for post-op ileus and atrial fibrillation. Evaluated by cardiology and rate controlled, no anticoagulation at this point. Transitioned from Cardizem to Lopressor.     Today,    REVIEW OF SYSTEMS      PAST MEDICAL & SURGICAL HISTORY  Atrial fibrillation (new onset)  Hypothyroidism    SOCIAL HISTORY  Smoking - Former smoker - quit 2 months ago  EtOH - Drinks wine over the weekends - quit about 6 months ago  Drugs - Denied    FUNCTIONAL HISTORY  _______ hand dominant  Lives alone in a apartment with 4STE + HR and flight of stairs to 2nd floor + HR  Girlfriend at home to help if needed  Independent in ambulation, ADL's, transfers prior to hospitalization    CURRENT FUNCTIONAL STATUS  Bed mobility - Independent  Transfers - Independent with RW  Gait - 150' using RW requiring Supervision, 12 stairs with Supervision    FAMILY HISTORY   Reviewed and non-contributory    ALLERGIES  No Known Allergies    VITALS  T(C): 36.2 (10-22-18 @ 11:00)  T(F): 97.1 (10-22-18 @ 11:00), Max: 98.5 (10-21-18 @ 19:45)  HR: 88 (10-22-18 @ 11:00) (88 - 107)  BP: 103/55 (10-22-18 @ 11:00) (103/55 - 118/78)  RR:  (19 - 20)  SpO2:  (91% - 99%)  Wt(kg): --    PHYSICAL EXAM      RECENT LABS/IMAGING             11.2   4.4   )-----------( 391      ( 21 Oct 2018 08:11 )             37.0     138  |  96<L>  |  5.0<L>  ----------------------------<  97  4.2   |  32.0<H>  |  0.99    Ca    8.4<L>      21 Oct 2018 08:11  Phos  3.8     10-21  Mg     1.9     10-21    TPro  5.7<L>  /  Alb  2.6<L>  /  TBili  0.5  /  DBili  x   /  AST  13  /  ALT  12  /  AlkPhos  66  10-21    MEDICATIONS   MEDICATIONS  (STANDING):  acetaminophen   Tablet .. 650 milliGRAM(s) Oral every 6 hours  amoxicillin      Capsule 1000 milliGRAM(s) Oral every 12 hours  BACItracin   Ointment 1 Application(s) Topical two times a day  chlorhexidine 2% Cloths 1 Application(s) Topical daily  clarithromycin 500 milliGRAM(s) Oral every 12 hours  heparin  Injectable 7500 Unit(s) SubCutaneous every 8 hours  levothyroxine 50 MICROGram(s) Oral daily  lisinopril 2.5 milliGRAM(s) Oral daily  metoprolol succinate  milliGRAM(s) Oral daily  pantoprazole    Tablet 40 milliGRAM(s) Oral two times a day  sodium chloride 0.9% lock flush 3 milliLiter(s) IV Push every 8 hours    MEDICATIONS  (PRN):  bisacodyl Suppository 10 milliGRAM(s) Rectal once PRN Constipation  ondansetron    Tablet 4 milliGRAM(s) Oral every 6 hours PRN Nausea and/or Vomiting  oxyCODONE    IR 5 milliGRAM(s) Oral every 4 hours PRN Moderate Pain (4 - 6)  oxyCODONE    IR 10 milliGRAM(s) Oral every 4 hours PRN Severe Pain (7 - 10)    ASSESSMENT/PLAN  59 y/o male with acute onset vague abdominal pain who was found to have perforated duodenal ulcer s/p exploratory laparotomy for repair and open cholecystectomy. Post-operative course notable for ileus and new onset atrial fibrillation.    Disposition -  Precautions - Falls, Cardiac  Weight bearing status - Full weight bearing  PT - ROM, Bed mobility, Transfers, Ambulation with assistive device  OT - ADLs, ROM  Pain control - Tylenol PRN, Oxycodone PRN  DVT Prophylaxis - Heparin  Skin - Turn Q2hrs  Diet - Regular/thins HPI:  Mr. Umana is a 58 year old male with no medical care who presented to [facility] on 10/8 with complaints of dyspnea and vague abdominal pain x 1 week prior to admission. Pain was described as dull ache, that is constant in nature with no provoking or alleviating factors. Also had associated nausea. He was found to have perforated duodenal ulcer and underwent exploratory laparotomy for repair and open cholecystectomy on 10/8. Hospital course notable for post-op ileus and atrial fibrillation. Evaluated by cardiology and rate controlled, no anticoagulation at this point. Transitioned from Cardizem to Lopressor.     Today, he reports that he was able to tolerate therapy well but has pain in his lateral left knee that limited him. Denies abdominal pain, nausea, vomiting, chest pain, shortness of breathe, cough.    REVIEW OF SYSTEMS  Constitutional - No fever, No fatigue  HEENT - No visual disturbances, No difficulty hearing,  No neck pain  Respiratory - No cough, No wheezing, No shortness of breath  Cardiovascular - No chest pain, No palpitations  Gastrointestinal - No abdominal pain, No nausea, No vomiting, No diarrhea, No constipation  Genitourinary - No dysuria, No frequency, No hematuria, No incontinence  Neurological - No headaches, No loss of strength, No numbness  Musculoskeletal - (+) Left knee pain, No joint swelling, No muscle pain  Psychiatric - No depression, No anxiety  All other review of systems negative    PAST MEDICAL & SURGICAL HISTORY  Atrial fibrillation (new onset)  Hypothyroidism    SOCIAL HISTORY  Smoking - Former smoker - quit 2 months ago  EtOH - Drinks wine over the weekends - quit about 6 months ago  Drugs - Denied    FUNCTIONAL HISTORY  Right hand dominant  Lives alone in a apartment with 4STE + HR and flight of stairs to 2nd floor + HR  Girlfriend at home to help if needed  Independent in ambulation, ADL's, transfers prior to hospitalization    CURRENT FUNCTIONAL STATUS  Bed mobility - Independent  Transfers - Supervision without assistive device  Gait - 150' using RW requiring Supervision, 12 stairs with Supervision    FAMILY HISTORY   Reviewed and non-contributory    ALLERGIES  No Known Allergies    VITALS  T(C): 36.2 (10-22-18 @ 11:00)  T(F): 97.1 (10-22-18 @ 11:00), Max: 98.5 (10-21-18 @ 19:45)  HR: 88 (10-22-18 @ 11:00) (88 - 107)  BP: 103/55 (10-22-18 @ 11:00) (103/55 - 118/78)  RR:  (19 - 20)  SpO2:  (91% - 99%)  Wt(kg): --    PHYSICAL EXAM  Constitutional - NAD, Comfortable  HEENT - NCAT, EOMI  Chest - CTA bilaterally  Cardiovascular - RRR, S1S2  Abdomen - Obese, Midline abdominal wound C/D/I, BS+, Soft  Extremities - Bilateral lower extremity skin changes with trace edema  Neurologic Exam -                    Cognitive - Awake, Alert, Oriented to self, place, date, year, situation     Communication - Fluent, No dysarthria     Cranial Nerves - CN 2-12 grossly intact     Motor -                     LEFT    UE - ShAB 5/5, EF 5/5, EE 5/5, WE 5/5,  5/5                    RIGHT UE - ShAB 5/5, EF 5/5, EE 5/5, WE 5/5,  5/5                    LEFT    LE - HF 5/5, KE 4/5 (limited by pain), DF 5/5, PF 5/5                    RIGHT LE - HF 5/5, KE 5/5, DF 5/5, PF 5/5        Sensory - Intact to light touch diffusely     Reflexes - DTR intact and symmetrical, Negative Hinton's bilaterally, Negative Babinski's bilaterally   Psychiatric - Affect WNL  Skin - Bilateral lower extremity chronic skin changes    RECENT LABS/IMAGING             11.2   4.4   )-----------( 391      ( 21 Oct 2018 08:11 )             37.0     138  |  96<L>  |  5.0<L>  ----------------------------<  97  4.2   |  32.0<H>  |  0.99    Ca    8.4<L>      21 Oct 2018 08:11  Phos  3.8     10-21  Mg     1.9     10-21    TPro  5.7<L>  /  Alb  2.6<L>  /  TBili  0.5  /  DBili  x   /  AST  13  /  ALT  12  /  AlkPhos  66  10-21    MEDICATIONS   MEDICATIONS  (STANDING):  acetaminophen   Tablet .. 650 milliGRAM(s) Oral every 6 hours  amoxicillin      Capsule 1000 milliGRAM(s) Oral every 12 hours  BACItracin   Ointment 1 Application(s) Topical two times a day  chlorhexidine 2% Cloths 1 Application(s) Topical daily  clarithromycin 500 milliGRAM(s) Oral every 12 hours  heparin  Injectable 7500 Unit(s) SubCutaneous every 8 hours  levothyroxine 50 MICROGram(s) Oral daily  lisinopril 2.5 milliGRAM(s) Oral daily  metoprolol succinate  milliGRAM(s) Oral daily  pantoprazole    Tablet 40 milliGRAM(s) Oral two times a day  sodium chloride 0.9% lock flush 3 milliLiter(s) IV Push every 8 hours    MEDICATIONS  (PRN):  bisacodyl Suppository 10 milliGRAM(s) Rectal once PRN Constipation  ondansetron    Tablet 4 milliGRAM(s) Oral every 6 hours PRN Nausea and/or Vomiting  oxyCODONE    IR 5 milliGRAM(s) Oral every 4 hours PRN Moderate Pain (4 - 6)  oxyCODONE    IR 10 milliGRAM(s) Oral every 4 hours PRN Severe Pain (7 - 10)    ASSESSMENT/PLAN  59 y/o male with acute onset vague abdominal pain who was found to have perforated duodenal ulcer s/p exploratory laparotomy for repair and open cholecystectomy. Post-operative course notable for ileus and new onset atrial fibrillation. He is now with gait, functional, endurance, transfer, balance impairments.    Disposition - When patient medically cleared, he would benefit from a short course of rehabilitation services consisting of PT/OT at a subacute rehabilitation facility to work on endurance, balance, gait, stairs, ADL's.  Precautions - Falls, Cardiac  Weight bearing status - Full weight bearing  PT - ROM, Bed mobility, Transfers, Ambulation with assistive device  OT - ADLs, ROM  Pain control - Tylenol PRN, Oxycodone PRN  DVT Prophylaxis - Heparin  Skin - Turn Q2hrs  Diet - Regular/thins

## 2018-10-23 LAB
ALBUMIN SERPL ELPH-MCNC: 2.8 G/DL — LOW (ref 3.3–5.2)
ALP SERPL-CCNC: 74 U/L — SIGNIFICANT CHANGE UP (ref 40–120)
ALT FLD-CCNC: 12 U/L — SIGNIFICANT CHANGE UP
ANION GAP SERPL CALC-SCNC: 12 MMOL/L — SIGNIFICANT CHANGE UP (ref 5–17)
AST SERPL-CCNC: 12 U/L — SIGNIFICANT CHANGE UP
BASOPHILS # BLD AUTO: 0 K/UL — SIGNIFICANT CHANGE UP (ref 0–0.2)
BASOPHILS NFR BLD AUTO: 1.1 % — SIGNIFICANT CHANGE UP (ref 0–2)
BILIRUB SERPL-MCNC: 0.4 MG/DL — SIGNIFICANT CHANGE UP (ref 0.4–2)
BUN SERPL-MCNC: 10 MG/DL — SIGNIFICANT CHANGE UP (ref 8–20)
CALCIUM SERPL-MCNC: 8.7 MG/DL — SIGNIFICANT CHANGE UP (ref 8.6–10.2)
CHLORIDE SERPL-SCNC: 101 MMOL/L — SIGNIFICANT CHANGE UP (ref 98–107)
CO2 SERPL-SCNC: 27 MMOL/L — SIGNIFICANT CHANGE UP (ref 22–29)
CREAT SERPL-MCNC: 1.02 MG/DL — SIGNIFICANT CHANGE UP (ref 0.5–1.3)
EOSINOPHIL # BLD AUTO: 0.1 K/UL — SIGNIFICANT CHANGE UP (ref 0–0.5)
EOSINOPHIL NFR BLD AUTO: 1.8 % — SIGNIFICANT CHANGE UP (ref 0–6)
GLUCOSE SERPL-MCNC: 100 MG/DL — SIGNIFICANT CHANGE UP (ref 70–115)
HCT VFR BLD CALC: 35 % — LOW (ref 42–52)
HGB BLD-MCNC: 10.6 G/DL — LOW (ref 14–18)
LYMPHOCYTES # BLD AUTO: 1 K/UL — SIGNIFICANT CHANGE UP (ref 1–4.8)
LYMPHOCYTES # BLD AUTO: 21.6 % — SIGNIFICANT CHANGE UP (ref 20–55)
MAGNESIUM SERPL-MCNC: 1.5 MG/DL — LOW (ref 1.6–2.6)
MCHC RBC-ENTMCNC: 26.7 PG — LOW (ref 27–31)
MCHC RBC-ENTMCNC: 30.3 G/DL — LOW (ref 32–36)
MCV RBC AUTO: 88.2 FL — SIGNIFICANT CHANGE UP (ref 80–94)
MONOCYTES # BLD AUTO: 0.9 K/UL — HIGH (ref 0–0.8)
MONOCYTES NFR BLD AUTO: 19.6 % — HIGH (ref 3–10)
NEUTROPHILS # BLD AUTO: 2.4 K/UL — SIGNIFICANT CHANGE UP (ref 1.8–8)
NEUTROPHILS NFR BLD AUTO: 54.3 % — SIGNIFICANT CHANGE UP (ref 37–73)
PHOSPHATE SERPL-MCNC: 2.3 MG/DL — LOW (ref 2.4–4.7)
PLATELET # BLD AUTO: 446 K/UL — HIGH (ref 150–400)
POTASSIUM SERPL-MCNC: 4.2 MMOL/L — SIGNIFICANT CHANGE UP (ref 3.5–5.3)
POTASSIUM SERPL-SCNC: 4.2 MMOL/L — SIGNIFICANT CHANGE UP (ref 3.5–5.3)
PROT SERPL-MCNC: 6.2 G/DL — LOW (ref 6.6–8.7)
RBC # BLD: 3.97 M/UL — LOW (ref 4.6–6.2)
RBC # FLD: 17.8 % — HIGH (ref 11–15.6)
SODIUM SERPL-SCNC: 140 MMOL/L — SIGNIFICANT CHANGE UP (ref 135–145)
WBC # BLD: 4.5 K/UL — LOW (ref 4.8–10.8)
WBC # FLD AUTO: 4.5 K/UL — LOW (ref 4.8–10.8)

## 2018-10-23 PROCEDURE — 93970 EXTREMITY STUDY: CPT | Mod: 26

## 2018-10-23 PROCEDURE — 99222 1ST HOSP IP/OBS MODERATE 55: CPT | Mod: GC

## 2018-10-23 RX ORDER — MAGNESIUM OXIDE 400 MG ORAL TABLET 241.3 MG
400 TABLET ORAL ONCE
Qty: 0 | Refills: 0 | Status: COMPLETED | OUTPATIENT
Start: 2018-10-23 | End: 2018-10-23

## 2018-10-23 RX ADMIN — Medication 650 MILLIGRAM(S): at 13:36

## 2018-10-23 RX ADMIN — HEPARIN SODIUM 7500 UNIT(S): 5000 INJECTION INTRAVENOUS; SUBCUTANEOUS at 22:52

## 2018-10-23 RX ADMIN — PANTOPRAZOLE SODIUM 40 MILLIGRAM(S): 20 TABLET, DELAYED RELEASE ORAL at 17:34

## 2018-10-23 RX ADMIN — Medication 650 MILLIGRAM(S): at 05:54

## 2018-10-23 RX ADMIN — HEPARIN SODIUM 7500 UNIT(S): 5000 INJECTION INTRAVENOUS; SUBCUTANEOUS at 05:53

## 2018-10-23 RX ADMIN — Medication 650 MILLIGRAM(S): at 18:05

## 2018-10-23 RX ADMIN — Medication 650 MILLIGRAM(S): at 12:53

## 2018-10-23 RX ADMIN — MAGNESIUM OXIDE 400 MG ORAL TABLET 400 MILLIGRAM(S): 241.3 TABLET ORAL at 14:27

## 2018-10-23 RX ADMIN — HEPARIN SODIUM 7500 UNIT(S): 5000 INJECTION INTRAVENOUS; SUBCUTANEOUS at 14:26

## 2018-10-23 RX ADMIN — Medication 500 MILLIGRAM(S): at 05:54

## 2018-10-23 RX ADMIN — Medication 500 MILLIGRAM(S): at 17:33

## 2018-10-23 RX ADMIN — SODIUM CHLORIDE 3 MILLILITER(S): 9 INJECTION INTRAMUSCULAR; INTRAVENOUS; SUBCUTANEOUS at 06:04

## 2018-10-23 RX ADMIN — SODIUM CHLORIDE 3 MILLILITER(S): 9 INJECTION INTRAMUSCULAR; INTRAVENOUS; SUBCUTANEOUS at 14:30

## 2018-10-23 RX ADMIN — Medication 1 APPLICATION(S): at 17:36

## 2018-10-23 RX ADMIN — Medication 650 MILLIGRAM(S): at 06:25

## 2018-10-23 RX ADMIN — Medication 50 MICROGRAM(S): at 05:54

## 2018-10-23 RX ADMIN — Medication 650 MILLIGRAM(S): at 17:32

## 2018-10-23 RX ADMIN — Medication 1 APPLICATION(S): at 05:55

## 2018-10-23 RX ADMIN — LISINOPRIL 2.5 MILLIGRAM(S): 2.5 TABLET ORAL at 05:54

## 2018-10-23 RX ADMIN — Medication 1000 MILLIGRAM(S): at 17:33

## 2018-10-23 RX ADMIN — Medication 1000 MILLIGRAM(S): at 05:54

## 2018-10-23 RX ADMIN — Medication 100 MILLIGRAM(S): at 05:54

## 2018-10-23 RX ADMIN — Medication 650 MILLIGRAM(S): at 22:51

## 2018-10-23 RX ADMIN — CHLORHEXIDINE GLUCONATE 1 APPLICATION(S): 213 SOLUTION TOPICAL at 12:54

## 2018-10-23 RX ADMIN — PANTOPRAZOLE SODIUM 40 MILLIGRAM(S): 20 TABLET, DELAYED RELEASE ORAL at 05:55

## 2018-10-23 NOTE — PROGRESS NOTE ADULT - ATTENDING COMMENTS
Dispo planning
O/n events noted.  Perf'd DU ulcer (D1) s/p ex-lap, Skyler patch and cholecystectomy also done.  Has been improving physiologically and only on a small dose Levophed - as MAPs have consistently been >65 will titrate Levo to off.  Remains vented but following commands and P/F ratio good so will wean to PS.  If meets extubation criteria (will check RSBI) then will extubate.  UO > 1000ml since arrival to SICU.  Resolved hypovolemic shock and resolving septic shock.  Will continue abx (Zosyn) x 4 days 2/2 perf'd viscous.  Will send H. pylori.  On PPI already.  Remains with armstrong and central access for continued resuscitation.  Remains in SICU for continued critical care resuscitation.
Overall doing well.  +flatus.  Tolerating clears.  Voided after armstrong removal.  LON removed yesterday w/o incident.  Afebrile.  HD normal.  Abdomen softly distended but non-tender, incision clean.  Continue PPI and H. pylori treatment.  Dispo planning.
Patient seen and examined.  abdomen remains distended mildly  tympanic, passing flatus when ambulating.  LON with serous fluid on clear liquids.  Advance to full liquids  Remove LON (suture close)  local wound care  OOB, ambulate  TALA improving  H pylori  tx  of Cardizem for afib (no on NSR)
The patient was seen and examined  No new problems  Tolerating his diet  Mobilizing with PT  Wound care to midline wound  Awaiting discharge to [facility]  DVT prophylaxis
Overall doing well.  Hemodynamically normal.  Awake, alert and communicating his needs.  Denies pain.  +flatus.  No SABINO/SOB.  Abdomen: S/NT/ND, LON w/ voluminous ascitic output.  Will keep LON in.  NPO x 72 hrs.  Received Digoxin o/n for Afib.  Will now start full dose anticoagulation and consult cardiology for possible RUBEN and cardioversion.  Continue Zosyn x4days total.  PPI.  Await H. pylori results (will treat if positive).  Cr remains elevated, will continue resuscitation and monitor UO via indwelling armstrong.
The patient was seen and examined  No new problems  Treatment for H pylori  Regular diet  DVT prophylaxis  Mobilize OOB
Tolerating diet. No pain. Normal bowel function. Abdomen is nontender. obese. midline wound with good granulation tissue. Afib rate controlled. Endocrine following for hypothyroid. Needs PT and dispo planning. Continue treatment for h pylori.

## 2018-10-23 NOTE — PROGRESS NOTE ADULT - SUBJECTIVE AND OBJECTIVE BOX
INTERVAL HPI/OVERNIGHT EVENTS/SUBJECTIVE:  Patient worked with PT and reports some calf pain after walking. duplex was ordered, negative for DVT,   Denies chest pain, shortness of breath. Several bowel movements over the weekend. +flatus. Pain well controlled. Continues to ambulate. Dressing changed this morning. Accepted to rehab, will be discharged today.     ICU Vital Signs Last 24 Hrs  T(C): 36.6 (23 Oct 2018 10:58), Max: 36.9 (23 Oct 2018 04:10)  T(F): 97.9 (23 Oct 2018 10:58), Max: 98.5 (23 Oct 2018 04:10)  HR: 118 (23 Oct 2018 10:58) (88 - 118)  BP: 104/60 (23 Oct 2018 10:58) (99/58 - 117/68)  BP(mean): --  ABP: --  ABP(mean): --  RR: 20 (23 Oct 2018 10:58) (18 - 20)  SpO2: 92% (23 Oct 2018 04:00) (92% - 96%)      I&O's Detail    22 Oct 2018 07:01  -  23 Oct 2018 07:00  --------------------------------------------------------  IN:  Total IN: 0 mL    OUT:    Stool: 2 mL    Voided: 400 mL  Total OUT: 402 mL    Total NET: -402 mL    MEDICATIONS  (STANDING):  acetaminophen   Tablet .. 650 milliGRAM(s) Oral every 6 hours  amoxicillin      Capsule 1000 milliGRAM(s) Oral every 12 hours  BACItracin   Ointment 1 Application(s) Topical two times a day  chlorhexidine 2% Cloths 1 Application(s) Topical daily  clarithromycin 500 milliGRAM(s) Oral every 12 hours  heparin  Injectable 7500 Unit(s) SubCutaneous every 8 hours  levothyroxine 50 MICROGram(s) Oral daily  lisinopril 2.5 milliGRAM(s) Oral daily  magnesium oxide 400 milliGRAM(s) Oral once  metoprolol succinate  milliGRAM(s) Oral daily  pantoprazole    Tablet 40 milliGRAM(s) Oral two times a day  sodium chloride 0.9% lock flush 3 milliLiter(s) IV Push every 8 hours    MEDICATIONS  (PRN):  bisacodyl Suppository 10 milliGRAM(s) Rectal once PRN Constipation  ondansetron    Tablet 4 milliGRAM(s) Oral every 6 hours PRN Nausea and/or Vomiting  oxyCODONE    IR 5 milliGRAM(s) Oral every 4 hours PRN Moderate Pain (4 - 6)  oxyCODONE    IR 10 milliGRAM(s) Oral every 4 hours PRN Severe Pain (7 - 10)      MISC:   PHYSICAL EXAM:  cv: s1/s2  resp: nonlabored breathing  gi: soft, nd, nttp. midline wound dressing removed and replaced, c/d/i. Wound itself is healing well with good granulation tissue.  extremities: some vague calf pain, ttp. chronic venous stasis changes b/l     LABS:  CBC Full  -  ( 23 Oct 2018 08:17 )  WBC Count : 4.5 K/uL  Hemoglobin : 10.6 g/dL  Hematocrit : 35.0 %  Platelet Count - Automated : 446 K/uL  Mean Cell Volume : 88.2 fl  Mean Cell Hemoglobin : 26.7 pg  Mean Cell Hemoglobin Concentration : 30.3 g/dL  Auto Neutrophil # : 2.4 K/uL  Auto Lymphocyte # : 1.0 K/uL  Auto Monocyte # : 0.9 K/uL  Auto Eosinophil # : 0.1 K/uL  Auto Basophil # : 0.0 K/uL  Auto Neutrophil % : 54.3 %  Auto Lymphocyte % : 21.6 %  Auto Monocyte % : 19.6 %  Auto Eosinophil % : 1.8 %  Auto Basophil % : 1.1 %    10-23    140  |  101  |  10.0  ----------------------------<  100  4.2   |  27.0  |  1.02    Ca    8.7      23 Oct 2018 08:17  Phos  2.3     10-23  Mg     1.5     10-23    TPro  6.2<L>  /  Alb  2.8<L>  /  TBili  0.4  /  DBili  x   /  AST  12  /  ALT  12  /  AlkPhos  74  10-23        RECENT CULTURES:  10-16 .Body Fluid Peritoneal Fluid XXXX XXXX XXXX    10-16 .Body Fluid Peritoneal Fluid XXXX   Few White blood cells  No organisms seen   No growth at 5 days.        LIVER FUNCTIONS - ( 23 Oct 2018 08:17 )  Alb: 2.8 g/dL / Pro: 6.2 g/dL / ALK PHOS: 74 U/L / ALT: 12 U/L / AST: 12 U/L / GGT: x             RADIOLOGY & ADDITIONAL STUDIES:  < from: US Duplex Venous Lower Ext Complete, Bilateral (10.23.18 @ 10:38) >    IMPRESSION:     No evidence of bilateral lower extremity deep venous thrombosis.    < end of copied text >      ASSESSMENT/PLAN:  58yoM s/p ex-lap, tara patch and open choley on 10/8 for perforated duodenal ulcer with post-op ileus and AFib.  Rate controlled. No anticoagulation for now, per cards as his CHADSVACs score is low.     - lopressor 50mg BID converted to toprol xL 100 in anticipation of discharge.  - accepted to rehab, d/c today  - continue abx for hpylori treatment until 10/25  - continue daily dressing change  - encourage oob and is use  - regular diet  - dvt ppx

## 2018-10-24 VITALS
HEART RATE: 91 BPM | RESPIRATION RATE: 19 BRPM | SYSTOLIC BLOOD PRESSURE: 109 MMHG | OXYGEN SATURATION: 98 % | DIASTOLIC BLOOD PRESSURE: 74 MMHG | TEMPERATURE: 98 F

## 2018-10-24 PROCEDURE — 82550 ASSAY OF CK (CPK): CPT

## 2018-10-24 PROCEDURE — 76942 ECHO GUIDE FOR BIOPSY: CPT

## 2018-10-24 PROCEDURE — 71045 X-RAY EXAM CHEST 1 VIEW: CPT

## 2018-10-24 PROCEDURE — 84132 ASSAY OF SERUM POTASSIUM: CPT

## 2018-10-24 PROCEDURE — 85014 HEMATOCRIT: CPT

## 2018-10-24 PROCEDURE — 93005 ELECTROCARDIOGRAM TRACING: CPT

## 2018-10-24 PROCEDURE — 84300 ASSAY OF URINE SODIUM: CPT

## 2018-10-24 PROCEDURE — 36592 COLLECT BLOOD FROM PICC: CPT

## 2018-10-24 PROCEDURE — 36430 TRANSFUSION BLD/BLD COMPNT: CPT

## 2018-10-24 PROCEDURE — 82803 BLOOD GASES ANY COMBINATION: CPT

## 2018-10-24 PROCEDURE — 97530 THERAPEUTIC ACTIVITIES: CPT

## 2018-10-24 PROCEDURE — 87116 MYCOBACTERIA CULTURE: CPT

## 2018-10-24 PROCEDURE — 86850 RBC ANTIBODY SCREEN: CPT

## 2018-10-24 PROCEDURE — 86376 MICROSOMAL ANTIBODY EACH: CPT

## 2018-10-24 PROCEDURE — 86901 BLOOD TYPING SEROLOGIC RH(D): CPT

## 2018-10-24 PROCEDURE — 96366 THER/PROPH/DIAG IV INF ADDON: CPT

## 2018-10-24 PROCEDURE — 71275 CT ANGIOGRAPHY CHEST: CPT

## 2018-10-24 PROCEDURE — 87040 BLOOD CULTURE FOR BACTERIA: CPT

## 2018-10-24 PROCEDURE — 96365 THER/PROPH/DIAG IV INF INIT: CPT | Mod: XU

## 2018-10-24 PROCEDURE — 84157 ASSAY OF PROTEIN OTHER: CPT

## 2018-10-24 PROCEDURE — 96375 TX/PRO/DX INJ NEW DRUG ADDON: CPT

## 2018-10-24 PROCEDURE — 87206 SMEAR FLUORESCENT/ACID STAI: CPT

## 2018-10-24 PROCEDURE — 87015 SPECIMEN INFECT AGNT CONCNTJ: CPT

## 2018-10-24 PROCEDURE — 80162 ASSAY OF DIGOXIN TOTAL: CPT

## 2018-10-24 PROCEDURE — 87070 CULTURE OTHR SPECIMN AEROBIC: CPT

## 2018-10-24 PROCEDURE — 86900 BLOOD TYPING SEROLOGIC ABO: CPT

## 2018-10-24 PROCEDURE — 83605 ASSAY OF LACTIC ACID: CPT

## 2018-10-24 PROCEDURE — 36415 COLL VENOUS BLD VENIPUNCTURE: CPT

## 2018-10-24 PROCEDURE — 88305 TISSUE EXAM BY PATHOLOGIST: CPT

## 2018-10-24 PROCEDURE — 82435 ASSAY OF BLOOD CHLORIDE: CPT

## 2018-10-24 PROCEDURE — 87102 FUNGUS ISOLATION CULTURE: CPT

## 2018-10-24 PROCEDURE — 99291 CRITICAL CARE FIRST HOUR: CPT | Mod: 25

## 2018-10-24 PROCEDURE — 97163 PT EVAL HIGH COMPLEX 45 MIN: CPT

## 2018-10-24 PROCEDURE — 86923 COMPATIBILITY TEST ELECTRIC: CPT

## 2018-10-24 PROCEDURE — 88112 CYTOPATH CELL ENHANCE TECH: CPT

## 2018-10-24 PROCEDURE — 85610 PROTHROMBIN TIME: CPT

## 2018-10-24 PROCEDURE — 94660 CPAP INITIATION&MGMT: CPT

## 2018-10-24 PROCEDURE — 87075 CULTR BACTERIA EXCEPT BLOOD: CPT

## 2018-10-24 PROCEDURE — 82436 ASSAY OF URINE CHLORIDE: CPT

## 2018-10-24 PROCEDURE — 74177 CT ABD & PELVIS W/CONTRAST: CPT

## 2018-10-24 PROCEDURE — 81001 URINALYSIS AUTO W/SCOPE: CPT

## 2018-10-24 PROCEDURE — 97116 GAIT TRAINING THERAPY: CPT

## 2018-10-24 PROCEDURE — 93970 EXTREMITY STUDY: CPT

## 2018-10-24 PROCEDURE — 85730 THROMBOPLASTIN TIME PARTIAL: CPT

## 2018-10-24 PROCEDURE — 84484 ASSAY OF TROPONIN QUANT: CPT

## 2018-10-24 PROCEDURE — 83615 LACTATE (LD) (LDH) ENZYME: CPT

## 2018-10-24 PROCEDURE — 83735 ASSAY OF MAGNESIUM: CPT

## 2018-10-24 PROCEDURE — 85027 COMPLETE CBC AUTOMATED: CPT

## 2018-10-24 PROCEDURE — 80048 BASIC METABOLIC PNL TOTAL CA: CPT

## 2018-10-24 PROCEDURE — 82947 ASSAY GLUCOSE BLOOD QUANT: CPT

## 2018-10-24 PROCEDURE — 87205 SMEAR GRAM STAIN: CPT

## 2018-10-24 PROCEDURE — 82570 ASSAY OF URINE CREATININE: CPT

## 2018-10-24 PROCEDURE — 82945 GLUCOSE OTHER FLUID: CPT

## 2018-10-24 PROCEDURE — 96367 TX/PROPH/DG ADDL SEQ IV INF: CPT

## 2018-10-24 PROCEDURE — 94002 VENT MGMT INPAT INIT DAY: CPT

## 2018-10-24 PROCEDURE — 84100 ASSAY OF PHOSPHORUS: CPT

## 2018-10-24 PROCEDURE — 83880 ASSAY OF NATRIURETIC PEPTIDE: CPT

## 2018-10-24 PROCEDURE — 97110 THERAPEUTIC EXERCISES: CPT

## 2018-10-24 PROCEDURE — 84443 ASSAY THYROID STIM HORMONE: CPT

## 2018-10-24 PROCEDURE — 74018 RADEX ABDOMEN 1 VIEW: CPT

## 2018-10-24 PROCEDURE — 88304 TISSUE EXAM BY PATHOLOGIST: CPT

## 2018-10-24 PROCEDURE — 89051 BODY FLUID CELL COUNT: CPT

## 2018-10-24 PROCEDURE — 84295 ASSAY OF SERUM SODIUM: CPT

## 2018-10-24 PROCEDURE — 82042 OTHER SOURCE ALBUMIN QUAN EA: CPT

## 2018-10-24 PROCEDURE — 80053 COMPREHEN METABOLIC PANEL: CPT

## 2018-10-24 PROCEDURE — 82330 ASSAY OF CALCIUM: CPT

## 2018-10-24 PROCEDURE — 93306 TTE W/DOPPLER COMPLETE: CPT

## 2018-10-24 PROCEDURE — 74176 CT ABD & PELVIS W/O CONTRAST: CPT

## 2018-10-24 PROCEDURE — P9047: CPT

## 2018-10-24 PROCEDURE — P9059: CPT

## 2018-10-24 RX ADMIN — HEPARIN SODIUM 7500 UNIT(S): 5000 INJECTION INTRAVENOUS; SUBCUTANEOUS at 14:01

## 2018-10-24 RX ADMIN — SODIUM CHLORIDE 3 MILLILITER(S): 9 INJECTION INTRAMUSCULAR; INTRAVENOUS; SUBCUTANEOUS at 03:32

## 2018-10-24 RX ADMIN — HEPARIN SODIUM 7500 UNIT(S): 5000 INJECTION INTRAVENOUS; SUBCUTANEOUS at 06:15

## 2018-10-24 RX ADMIN — PANTOPRAZOLE SODIUM 40 MILLIGRAM(S): 20 TABLET, DELAYED RELEASE ORAL at 06:13

## 2018-10-24 RX ADMIN — Medication 650 MILLIGRAM(S): at 06:12

## 2018-10-24 RX ADMIN — Medication 1000 MILLIGRAM(S): at 06:13

## 2018-10-24 RX ADMIN — SODIUM CHLORIDE 3 MILLILITER(S): 9 INJECTION INTRAMUSCULAR; INTRAVENOUS; SUBCUTANEOUS at 13:44

## 2018-10-24 RX ADMIN — CHLORHEXIDINE GLUCONATE 1 APPLICATION(S): 213 SOLUTION TOPICAL at 12:13

## 2018-10-24 RX ADMIN — Medication 100 MILLIGRAM(S): at 06:13

## 2018-10-24 RX ADMIN — Medication 50 MICROGRAM(S): at 06:13

## 2018-10-24 RX ADMIN — LISINOPRIL 2.5 MILLIGRAM(S): 2.5 TABLET ORAL at 06:14

## 2018-10-24 RX ADMIN — Medication 650 MILLIGRAM(S): at 12:12

## 2018-10-24 RX ADMIN — Medication 500 MILLIGRAM(S): at 06:14

## 2018-10-24 RX ADMIN — SODIUM CHLORIDE 3 MILLILITER(S): 9 INJECTION INTRAMUSCULAR; INTRAVENOUS; SUBCUTANEOUS at 06:34

## 2018-10-24 RX ADMIN — Medication 650 MILLIGRAM(S): at 12:45

## 2018-10-24 RX ADMIN — Medication 1 APPLICATION(S): at 06:13

## 2018-10-24 NOTE — PROGRESS NOTE ADULT - SUBJECTIVE AND OBJECTIVE BOX
No DVT. Patient tolerating diet. Good bowel function. Abdomen soft, nontedner, good granulation tissue of midline wound. Discharge.

## 2018-10-24 NOTE — PROGRESS NOTE ADULT - PROVIDER SPECIALTY LIST ADULT
Cardiology
Critical Care
SICU
Surgery
Cardiology
Surgery
SICU

## 2018-10-24 NOTE — PROGRESS NOTE ADULT - REASON FOR ADMISSION
abdominal distention
abdominal pain
abdominal distention
(0) independent

## 2018-11-05 LAB
CULTURE RESULTS: SIGNIFICANT CHANGE UP
SPECIMEN SOURCE: SIGNIFICANT CHANGE UP

## 2018-11-08 ENCOUNTER — APPOINTMENT (OUTPATIENT)
Dept: TRAUMA SURGERY | Facility: CLINIC | Age: 58
End: 2018-11-08

## 2018-11-15 ENCOUNTER — APPOINTMENT (OUTPATIENT)
Dept: TRAUMA SURGERY | Facility: CLINIC | Age: 58
End: 2018-11-15
Payer: MEDICAID

## 2018-11-15 VITALS
TEMPERATURE: 97.5 F | HEIGHT: 72 IN | OXYGEN SATURATION: 99 % | SYSTOLIC BLOOD PRESSURE: 131 MMHG | HEART RATE: 116 BPM | DIASTOLIC BLOOD PRESSURE: 77 MMHG

## 2018-11-15 PROCEDURE — 99024 POSTOP FOLLOW-UP VISIT: CPT

## 2018-11-29 ENCOUNTER — APPOINTMENT (OUTPATIENT)
Dept: TRAUMA SURGERY | Facility: CLINIC | Age: 58
End: 2018-11-29
Payer: MEDICAID

## 2018-11-29 VITALS
TEMPERATURE: 97.5 F | BODY MASS INDEX: 34 KG/M2 | HEART RATE: 115 BPM | SYSTOLIC BLOOD PRESSURE: 111 MMHG | OXYGEN SATURATION: 95 % | WEIGHT: 251 LBS | HEIGHT: 72 IN | DIASTOLIC BLOOD PRESSURE: 73 MMHG

## 2018-11-29 DIAGNOSIS — Z90.49 ACQUIRED ABSENCE OF OTHER SPECIFIED PARTS OF DIGESTIVE TRACT: ICD-10-CM

## 2018-11-29 PROCEDURE — 99024 POSTOP FOLLOW-UP VISIT: CPT

## 2018-12-05 LAB
CULTURE RESULTS: SIGNIFICANT CHANGE UP
SPECIMEN SOURCE: SIGNIFICANT CHANGE UP

## 2018-12-10 PROBLEM — Z90.49 STATUS POST CHOLECYSTECTOMY: Status: ACTIVE | Noted: 2018-11-29

## 2018-12-13 ENCOUNTER — APPOINTMENT (OUTPATIENT)
Dept: TRAUMA SURGERY | Facility: CLINIC | Age: 58
End: 2018-12-13

## 2018-12-21 NOTE — AIRWAY REMOVAL NOTE  ADULT & PEDS - RESPIRATORY EXPANSION/ACCESSORY MUSCLES/RETRACTIONS
expansion symmetric/no retractions/no use of accessory muscles [Seizure frequency] : Seizure frequency: Yes [Etiology, seizure type, and epilepsy syndrome] : Etiology, seizure type, and epilepsy syndrome: Yes [Side effects of anti-seizure medications] : Side effects of anti-seizure medications: Yes [Safety and education around seizures] : Safety and education around seizures: Yes [Treatment-resistant epilepsy (every visit)] : Treatment-resistant epilepsy (every visit): Yes [Adherence to medication(s)] : Adherence to medication(s): Yes [Options for adjunctive therapy (Neurostimulation, CBD, Dietary Therapy, Epilepsy Surgery)] : Options for adjunctive therapy (Neurostimulation, CBD, Dietary Therapy, Epilepsy Surgery): Yes [25 Hydroxy Vitamin D level assessed and Vitamin D3 ordered] : 25 Hydroxy Vitamin D level assessed and Vitamin D3 ordered: Yes [Issues around driving] : Issues around driving: Not Applicable [Screening for anxiety, depression] : Screening for anxiety, depression: Not Applicable [Counseling for women of childbearing potential with epilepsy (including folic acid supplement)] : Counseling for women of childbearing potential with epilepsy (including folic acid supplement): Not Applicable

## 2018-12-27 ENCOUNTER — APPOINTMENT (OUTPATIENT)
Dept: TRAUMA SURGERY | Facility: CLINIC | Age: 58
End: 2018-12-27
Payer: MEDICAID

## 2018-12-27 VITALS
DIASTOLIC BLOOD PRESSURE: 78 MMHG | HEIGHT: 72 IN | TEMPERATURE: 97.4 F | SYSTOLIC BLOOD PRESSURE: 128 MMHG | OXYGEN SATURATION: 98 % | BODY MASS INDEX: 35.21 KG/M2 | WEIGHT: 260 LBS | HEART RATE: 102 BPM

## 2018-12-27 PROCEDURE — 99024 POSTOP FOLLOW-UP VISIT: CPT

## 2019-01-31 ENCOUNTER — RX CHANGE (OUTPATIENT)
Age: 59
End: 2019-01-31

## 2019-01-31 ENCOUNTER — APPOINTMENT (OUTPATIENT)
Dept: GASTROENTEROLOGY | Facility: CLINIC | Age: 59
End: 2019-01-31
Payer: MEDICAID

## 2019-01-31 VITALS
DIASTOLIC BLOOD PRESSURE: 78 MMHG | HEART RATE: 80 BPM | SYSTOLIC BLOOD PRESSURE: 138 MMHG | OXYGEN SATURATION: 96 % | RESPIRATION RATE: 16 BRPM | HEIGHT: 72 IN

## 2019-01-31 DIAGNOSIS — Z82.3 FAMILY HISTORY OF STROKE: ICD-10-CM

## 2019-01-31 DIAGNOSIS — Z82.49 FAMILY HISTORY OF ISCHEMIC HEART DISEASE AND OTHER DISEASES OF THE CIRCULATORY SYSTEM: ICD-10-CM

## 2019-01-31 DIAGNOSIS — K26.5 CHRONIC OR UNSPECIFIED DUODENAL ULCER WITH PERFORATION: ICD-10-CM

## 2019-01-31 PROCEDURE — 99204 OFFICE O/P NEW MOD 45 MIN: CPT

## 2019-01-31 RX ORDER — POTASSIUM CHLORIDE 10 MEQ
CAPSULE, EXTENDED RELEASE ORAL
Refills: 0 | Status: ACTIVE | COMMUNITY

## 2019-01-31 RX ORDER — LEVOTHYROXINE SODIUM 0.1 MG/1
100 TABLET ORAL
Refills: 0 | Status: ACTIVE | COMMUNITY

## 2019-01-31 RX ORDER — LISINOPRIL 5 MG/1
5 TABLET ORAL
Refills: 0 | Status: ACTIVE | COMMUNITY

## 2019-01-31 RX ORDER — TORSEMIDE 10 MG/ML
20 INJECTION, SOLUTION INTRAVENOUS
Refills: 0 | Status: ACTIVE | COMMUNITY

## 2019-01-31 RX ORDER — RANITIDINE 150 MG/1
150 TABLET ORAL
Refills: 0 | Status: ACTIVE | COMMUNITY

## 2019-01-31 RX ORDER — DOCUSATE SODIUM 100 MG/1
CAPSULE ORAL
Refills: 0 | Status: ACTIVE | COMMUNITY

## 2019-01-31 RX ORDER — SENNOSIDES 8.6 MG/1
TABLET ORAL
Refills: 0 | Status: ACTIVE | COMMUNITY

## 2019-01-31 RX ORDER — APIXABAN 5 MG/1
5 TABLET, FILM COATED ORAL
Refills: 0 | Status: ACTIVE | COMMUNITY

## 2019-01-31 NOTE — ASSESSMENT
[FreeTextEntry1] : The patient is presenting for follow up s/p perforated duodenal ulcer with gram patch. He will be started on Pantoprazole 40 mg BID. We will schedule him for an upper endoscopy to check for ulcer healing. I have discussed the indications, benefits, risks, and alternatives to an endoscopy. The patient understands all options and has agreed to endoscopy and is medically optimized for the planned procedure. \par He will obtain cardiac clearance prior to procedure and will need to be off of Eliquis for at least 3 days prior. He will also obtain basic lab work consisting of a CBC, CMP, PT/INR.

## 2019-01-31 NOTE — PHYSICAL EXAM
[General Appearance - Alert] : alert [General Appearance - In No Acute Distress] : in no acute distress [Sclera] : the sclera and conjunctiva were normal [PERRL With Normal Accommodation] : pupils were equal in size, round, and reactive to light [Extraocular Movements] : extraocular movements were intact [Outer Ear] : the ears and nose were normal in appearance [Oropharynx] : the oropharynx was normal [Neck Appearance] : the appearance of the neck was normal [Neck Cervical Mass (___cm)] : no neck mass was observed [Jugular Venous Distention Increased] : there was no jugular-venous distention [Thyroid Diffuse Enlargement] : the thyroid was not enlarged [Thyroid Nodule] : there were no palpable thyroid nodules [Auscultation Breath Sounds / Voice Sounds] : lungs were clear to auscultation bilaterally [Heart Rate And Rhythm] : heart rate was normal and rhythm regular [Heart Sounds] : normal S1 and S2 [Heart Sounds Gallop] : no gallops [Murmurs] : no murmurs [Heart Sounds Pericardial Friction Rub] : no pericardial rub [Full Pulse] : the pedal pulses are present [Edema] : there was no peripheral edema [Bowel Sounds] : normal bowel sounds [Abdomen Soft] : soft [Abdomen Tenderness] : non-tender [Abdomen Mass (___ Cm)] : no abdominal mass palpated [Abnormal Walk] : normal gait [Nail Clubbing] : no clubbing  or cyanosis of the fingernails [Musculoskeletal - Swelling] : no joint swelling seen [Motor Tone] : muscle strength and tone were normal [Skin Color & Pigmentation] : normal skin color and pigmentation [Skin Turgor] : normal skin turgor [] : no rash [Deep Tendon Reflexes (DTR)] : deep tendon reflexes were 2+ and symmetric [Sensation] : the sensory exam was normal to light touch and pinprick [No Focal Deficits] : no focal deficits [Oriented To Time, Place, And Person] : oriented to person, place, and time [Impaired Insight] : insight and judgment were intact [Affect] : the affect was normal [FreeTextEntry1] : deferred

## 2019-01-31 NOTE — ADDENDUM
[FreeTextEntry1] : I personally saw and examined the patient. I agree with the history, physical examination, assessment and recommendations. We will schedule him for the EGD pending cardiology clearance. I also recommended to start PPI therapy. Follow up after EGD

## 2019-01-31 NOTE — HISTORY OF PRESENT ILLNESS
[Heartburn] : denies heartburn [Nausea] : denies nausea [Vomiting] : denies vomiting [Diarrhea] : denies diarrhea [Constipation] : denies constipation [Yellow Skin Or Eyes (Jaundice)] : denies jaundice [Abdominal Pain] : denies abdominal pain [Abdominal Swelling] : denies abdominal swelling [Rectal Pain] : denies rectal pain [Peptic Ulcer Disease] : peptic ulcer disease [Cholecystectomy] : cholecystectomy [de-identified] : TAI RUBI is a 58 year old male presenting today s/p cholecystectomy and gram patch for perforated duodenal ulcer. His surgery was performed at [facility] in October. He is here for follow up of the duodenal ulcer. He  denies any nausea, vomiting, diarrhea, constipation, hematemesis, hematochezia, abdominal pain, bloating, unintended weight loss, decreased appetite, dysphagia or odynophagia. His hospital records were reviewed in full. He was discharged on Pantoprazole 40mg BID, however he states that he never received a prescription for it and has been taking ranitidine twice daily instead. His hospital course was complicated by new onset AFib with RVR for which he is now on Eliquis for.

## 2019-02-01 ENCOUNTER — RESULT REVIEW (OUTPATIENT)
Age: 59
End: 2019-02-01

## 2019-02-01 LAB
ALBUMIN SERPL ELPH-MCNC: 4.7 G/DL
ALP BLD-CCNC: 92 U/L
ALT SERPL-CCNC: 9 U/L
ANION GAP SERPL CALC-SCNC: 14 MMOL/L
AST SERPL-CCNC: 11 U/L
BASOPHILS # BLD AUTO: 0.04 K/UL
BASOPHILS NFR BLD AUTO: 0.5 %
BILIRUB SERPL-MCNC: 0.6 MG/DL
BUN SERPL-MCNC: 21 MG/DL
CALCIUM SERPL-MCNC: 10.1 MG/DL
CHLORIDE SERPL-SCNC: 99 MMOL/L
CO2 SERPL-SCNC: 26 MMOL/L
CREAT SERPL-MCNC: 1.18 MG/DL
EOSINOPHIL # BLD AUTO: 0.16 K/UL
EOSINOPHIL NFR BLD AUTO: 1.8 %
GLUCOSE SERPL-MCNC: 97 MG/DL
HCT VFR BLD CALC: 41.2 %
HGB BLD-MCNC: 12.7 G/DL
IMM GRANULOCYTES NFR BLD AUTO: 0.5 %
LYMPHOCYTES # BLD AUTO: 1.64 K/UL
LYMPHOCYTES NFR BLD AUTO: 18.6 %
MAN DIFF?: NORMAL
MCHC RBC-ENTMCNC: 27.7 PG
MCHC RBC-ENTMCNC: 30.8 GM/DL
MCV RBC AUTO: 89.8 FL
MONOCYTES # BLD AUTO: 0.91 K/UL
MONOCYTES NFR BLD AUTO: 10.3 %
NEUTROPHILS # BLD AUTO: 6.03 K/UL
NEUTROPHILS NFR BLD AUTO: 68.3 %
PLATELET # BLD AUTO: 389 K/UL
POTASSIUM SERPL-SCNC: 4.8 MMOL/L
PROT SERPL-MCNC: 8.1 G/DL
RBC # BLD: 4.59 M/UL
RBC # FLD: 17.5 %
SODIUM SERPL-SCNC: 139 MMOL/L
WBC # FLD AUTO: 8.82 K/UL

## 2019-02-15 ENCOUNTER — RX CHANGE (OUTPATIENT)
Age: 59
End: 2019-02-15

## 2019-02-18 ENCOUNTER — MEDICATION RENEWAL (OUTPATIENT)
Age: 59
End: 2019-02-18

## 2019-04-15 ENCOUNTER — RESULT REVIEW (OUTPATIENT)
Age: 59
End: 2019-04-15

## 2019-04-15 ENCOUNTER — APPOINTMENT (OUTPATIENT)
Dept: GASTROENTEROLOGY | Facility: GI CENTER | Age: 59
End: 2019-04-15
Payer: MEDICAID

## 2019-04-15 ENCOUNTER — OUTPATIENT (OUTPATIENT)
Dept: OUTPATIENT SERVICES | Facility: HOSPITAL | Age: 59
LOS: 1 days | End: 2019-04-15
Payer: COMMERCIAL

## 2019-04-15 DIAGNOSIS — K29.90 GASTRODUODENITIS, UNSPECIFIED, W/OUT BLEEDING: ICD-10-CM

## 2019-04-15 DIAGNOSIS — K26.9 DUODENAL ULCER, UNSPECIFIED AS ACUTE OR CHRONIC, W/OUT HEMORRHAGE OR PERFORATION: ICD-10-CM

## 2019-04-15 DIAGNOSIS — K22.9 DISEASE OF ESOPHAGUS, UNSPECIFIED: ICD-10-CM

## 2019-04-15 DIAGNOSIS — K26.5 CHRONIC OR UNSPECIFIED DUODENAL ULCER WITH PERFORATION: ICD-10-CM

## 2019-04-15 PROCEDURE — 88305 TISSUE EXAM BY PATHOLOGIST: CPT | Mod: 26

## 2019-04-15 PROCEDURE — 43239 EGD BIOPSY SINGLE/MULTIPLE: CPT

## 2019-04-15 PROCEDURE — 88305 TISSUE EXAM BY PATHOLOGIST: CPT

## 2019-04-15 PROCEDURE — 88342 IMHCHEM/IMCYTCHM 1ST ANTB: CPT

## 2019-04-15 PROCEDURE — 88342 IMHCHEM/IMCYTCHM 1ST ANTB: CPT | Mod: 26

## 2019-04-15 NOTE — PROCEDURE
[With Biopsy] : with biopsy [Procedure Explained] : The procedure was explained [Allergies Reviewed] : allergies reviewed. [Risks] : Risks [Benefits] : benefits [Alternatives] : alternatives [Patient] : the patient [Consent Obtained] : written consent was obtained prior to the procedure and is detailed in the patient's record [Automated Blood Pressure Cuff] : automated blood pressure cuff [Pulse Oximeter] : pulse oximeter [Cardiac Monitor] : cardiac monitor [3] : 3 [Sent to Pathology] : was sent to pathology for analysis [Tolerated Well] : the patient tolerated the procedure well [Vital Signs Stable] : the vital signs were stable [Biopsy] : biopsy [Erythema] : erythema [Duodenitis] : duodenitis [Normal] : Normal [de-identified] : History of perforated duodenal ulcer [de-identified] : Irregular Z line\par Z line at 45 cm. One small tongue extending to 44 cm\par s/p cold biopsy [de-identified] : s/p cold biopsy [de-identified] : Gastric; Z line [de-identified] : s/p cold biopsy

## 2019-04-15 NOTE — HISTORY OF PRESENT ILLNESS
[de-identified] : Patient arrived for EGD. NO complaints. Cleared by cardiology. Held eliquis for 3 days

## 2019-04-15 NOTE — REASON FOR VISIT
[Endoscopy] : an endoscopy [Procedure: _________] : a [unfilled] procedure visit [Family Member] : family member

## 2019-04-15 NOTE — ASSESSMENT
[FreeTextEntry1] : IMPRESSION:\par Gastritis and duodenitis\par Irregular Z line\par \par RECOMMENDATIONS:\par Await pathology results\par Treat for H. pylori if positive\par If evidence of Maher esophagus, repeat EGD in 1 year\par

## 2019-04-15 NOTE — HISTORY OF PRESENT ILLNESS
[de-identified] : Patient arrived for EGD. NO complaints. Cleared by cardiology. Held eliquis for 3 days

## 2019-04-15 NOTE — PROCEDURE
[With Biopsy] : with biopsy [Procedure Explained] : The procedure was explained [Allergies Reviewed] : allergies reviewed. [Risks] : Risks [Benefits] : benefits [Alternatives] : alternatives [Patient] : the patient [Consent Obtained] : written consent was obtained prior to the procedure and is detailed in the patient's record [Automated Blood Pressure Cuff] : automated blood pressure cuff [Cardiac Monitor] : cardiac monitor [Pulse Oximeter] : pulse oximeter [3] : 3 [Sent to Pathology] : was sent to pathology for analysis [Tolerated Well] : the patient tolerated the procedure well [Vital Signs Stable] : the vital signs were stable [Biopsy] : biopsy [Erythema] : erythema [Duodenitis] : duodenitis [Normal] : Normal [de-identified] : History of perforated duodenal ulcer [de-identified] : Irregular Z line\par Z line at 45 cm. One small tongue extending to 44 cm\par s/p cold biopsy [de-identified] : s/p cold biopsy [de-identified] : s/p cold biopsy [de-identified] : Gastric; Z line

## 2019-04-17 LAB — SURGICAL PATHOLOGY STUDY: SIGNIFICANT CHANGE UP

## 2019-04-19 ENCOUNTER — OTHER (OUTPATIENT)
Age: 59
End: 2019-04-19

## 2019-09-03 ENCOUNTER — MEDICATION RENEWAL (OUTPATIENT)
Age: 59
End: 2019-09-03

## 2019-09-03 RX ORDER — PANTOPRAZOLE 40 MG/1
40 TABLET, DELAYED RELEASE ORAL TWICE DAILY
Qty: 180 | Refills: 1 | Status: ACTIVE | COMMUNITY
Start: 2019-01-31 | End: 1900-01-01
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
## 2020-10-30 ENCOUNTER — APPOINTMENT (OUTPATIENT)
Dept: ORTHOPEDIC SURGERY | Facility: CLINIC | Age: 60
End: 2020-10-30
Payer: MEDICAID

## 2020-10-30 VITALS — SYSTOLIC BLOOD PRESSURE: 153 MMHG | DIASTOLIC BLOOD PRESSURE: 72 MMHG

## 2020-10-30 PROCEDURE — 99072 ADDL SUPL MATRL&STAF TM PHE: CPT

## 2020-10-30 PROCEDURE — 99203 OFFICE O/P NEW LOW 30 MIN: CPT

## 2020-10-30 RX ORDER — HYALURONATE SODIUM 20 MG/2 ML
20 SYRINGE (ML) INTRAARTICULAR
Qty: 6 | Refills: 0 | Status: ACTIVE | OUTPATIENT
Start: 2020-10-30

## 2020-10-30 NOTE — PHYSICAL EXAM
[de-identified] : General Exam\par \par Well developed, well nourished\par No apparent distress\par Oriented to person, place, and time\par Mood: Normal\par Affect: Normal\par Balance and coordination: Normal\par Gait: Normal\par \par left knee exam\par \par Skin: Clean, dry, intact\par Inspection: No obvious malalignment, no masses, no swelling, no effusion.\par Tenderness: no MJLT. No LJLT. No tenderness over the medial and lateral patella facets. No ttp medial/lateral epicondyle, patella tendon, tibial tubercle, pes anserinus, or proximal fibula.\par ROM: 0 to 130° no pain with deep flexion in both knees\par Stability: Stable to varus, valgus, lachman testing. Negative anterior/posterior drawer.\par Additional tests: Negative McMurrays test, Negative patellar grind test. \par Strength: 5/5 Q/H/TA/GS/EHL, no atrophy\par Neuro: In tact to light touch throughout in dp/sp/tib/jorge a/saph nerve districutions, DTR's normal\par Pulses: 2+ DP/PT pulses.\par \par right knee exam\par \par Skin: Clean, dry, intact\par Inspection: No obvious malalignment, no masses, no swelling, no effusion.\par Tenderness: no MJLT. No LJLT. No tenderness over the medial and lateral patella facets. No ttp medial/lateral epicondyle, patella tendon, tibial tubercle, pes anserinus, or proximal fibula.\par ROM: 0 to 130° no pain with deep flexion in both knees\par Stability: Stable to varus, valgus, lachman testing. Negative anterior/posterior drawer.\par Additional tests: Negative McMurrays test, Negative patellar grind test. \par Strength: 5/5 Q/H/TA/GS/EHL, no atrophy\par Neuro: In tact to light touch throughout in dp/sp/tib/jorge a/saph nerve districutions, DTR's normal\par Pulses: 2+ DP/PT pulses.\par  [de-identified] : Radiographs obtained outside were reviewed. There is bone infarcts bilaterally. There is severe osteoarthritis of the right knee and mild to moderate arthritis of the left knee\par \par

## 2020-10-30 NOTE — HISTORY OF PRESENT ILLNESS
[de-identified] : 60 y.o M presents to the office complaining of 2+ years of bilateral knee pain, R worse than L. He was previously told he has knee arthritis. He obtained 3x cortisone injections that provided him with about 1 month of pain relief. He presents with x-rays from [facility]  from Dec 2019. He complains of pain and difficulty with ambulation. Takes tramadol and NSAIDs for knee pain. \par \par The patient's past medical history, past surgical history, medications, allergies, and social history were reviewed by me today with the patient and documented accordingly. In addition, the patient's family history, which is noncontributory to this visit, was also reviewed.\par

## 2020-10-30 NOTE — DISCUSSION/SUMMARY
[de-identified] : I discussed the treatment of degenerative arthritis with the patient at length today. I described the spectrum of treatment from nonoperative modalities to total joint arthroplasty. Noninvasive and nonoperative treatment modalities include weight reduction, activity modification with low impact exercise, PRN use of acetaminophen or anti-inflammatory medication if tolerated, glucosamine/chondroitin supplements, and physical therapy. Further treatments can include corticosteroid injection and the use of hyaluronic acid injections. Definitive treatment can certainly include total joint arthroplasty also. The risks and benefits of each treatment options was discussed and all questions were answered. requesting ROSADO  encouraged weight loss

## 2020-11-18 ENCOUNTER — APPOINTMENT (OUTPATIENT)
Dept: ORTHOPEDIC SURGERY | Facility: CLINIC | Age: 60
End: 2020-11-18
Payer: MEDICAID

## 2020-11-18 PROCEDURE — 99213 OFFICE O/P EST LOW 20 MIN: CPT | Mod: 25

## 2020-11-18 PROCEDURE — 20610 DRAIN/INJ JOINT/BURSA W/O US: CPT | Mod: RT

## 2020-11-18 NOTE — HISTORY OF PRESENT ILLNESS
[de-identified] : 60 y.o M presents to the office complaining of 2+ years of bilateral knee pain, R worse than L. He was previously told he has knee arthritis. He obtained 3x cortisone injections that provided him with about 1 month of pain relief. He presents with x-rays from [facility] from Dec 2019. He complains of pain and difficulty with ambulation. Takes tramadol and NSAIDs for knee pain. He reports having had corticosteroid injections as he septic left knee after an injection done at [facility]

## 2020-11-18 NOTE — DISCUSSION/SUMMARY
[de-identified] : I discussed the treatment of degenerative arthritis with the patient at length today. I described the spectrum of treatment from nonoperative modalities to total joint arthroplasty. Noninvasive and nonoperative treatment modalities include weight reduction, activity modification with low impact exercise, PRN use of acetaminophen or anti-inflammatory medication if tolerated, glucosamine/chondroitin supplements, and physical therapy. Further treatments can include corticosteroid injection and the use of hyaluronic acid injections. Definitive treatment can certainly include total joint arthroplasty also. The risks and benefits of each treatment options was discussed and all questions were answered.\par \par Injection: Right knee joint.\par Indication: Arthritis\par A discussion was had with the patient regarding this procedure and all questions were answered. All risks, benefits and alternatives were discussed. These included but were not limited to bleeding, infection, and allergic reaction. Alcohol was used to clean the skin, and betadine was used to sterilize and prep the area in the supero-lateral aspect of the right knee. Ethyl chloride spray was then used as a topical anesthetic. A 21-gauge needle was used to inject 4cc of 1% lidocaine and 1cc of 40mg/ml methylprednisolone into the knee. A sterile bandage was then applied. The patient tolerated the procedure well and there were no complications. '\par \par \par I've asked him to obtain all medical records regarding his left knee and prior infection prior to any further discussion of treatment of his left knee all questions are answered

## 2020-11-18 NOTE — PHYSICAL EXAM
[de-identified] : left knee exam\par \par Skin: Clean, dry, intact\par Inspection: No obvious malalignment, no masses, no swelling, no effusion.\par Tenderness: no MJLT. No LJLT. No tenderness over the medial and lateral patella facets. No ttp medial/lateral epicondyle, patella tendon, tibial tubercle, pes anserinus, or proximal fibula.\par ROM: 0 to 130° no pain with deep flexion in both knees\par Stability: Stable to varus, valgus, lachman testing. Negative anterior/posterior drawer.\par Additional tests: Negative McMurrays test, Negative patellar grind test. \par Strength: 5/5 Q/H/TA/GS/EHL, no atrophy\par Neuro: In tact to light touch throughout in dp/sp/tib/jorge a/saph nerve districutions, DTR's normal\par Pulses: 2+ DP/PT pulses.\par \par right knee exam\par \par Skin: Clean, dry, intact\par Inspection: No obvious malalignment, no masses, no swelling, no effusion.\par Tenderness: no MJLT. No LJLT. No tenderness over the medial and lateral patella facets. No ttp medial/lateral epicondyle, patella tendon, tibial tubercle, pes anserinus, or proximal fibula.\par ROM: 0 to 130° no pain with deep flexion in both knees\par Stability: Stable to varus, valgus, lachman testing. Negative anterior/posterior drawer.\par Additional tests: Negative McMurrays test, Negative patellar grind test. \par Strength: 5/5 Q/H/TA/GS/EHL, no atrophy\par Neuro: In tact to light touch throughout in dp/sp/tib/jorge a/saph nerve districutions, DTR's normal\par Pulses: 2+ DP/PT pulses.\par  [de-identified] : I discussed the treatment of degenerative arthritis with the patient at length today. I described the spectrum of treatment from nonoperative modalities to total joint arthroplasty. Noninvasive and nonoperative treatment modalities include weight reduction, activity modification with low impact exercise, PRN use of acetaminophen or anti-inflammatory medication if tolerated, glucosamine/chondroitin supplements, and physical therapy. Further treatments can include corticosteroid injection and the use of hyaluronic acid injections. Definitive treatment can certainly include total joint arthroplasty also. The risks and benefits of each treatment options was discussed and all questions were answered.  Injection: Right knee joint. Indication: Arthritis A discussion was had with the patient regarding this procedure and all questions were answered. All risks, benefits and alternatives were discussed. These included but were not limited to bleeding, infection, and allergic reaction. Alcohol was used to clean the skin, and betadine was used to sterilize and prep the area in the supero-lateral aspect of the right knee. Ethyl chloride spray was then used as a topical anesthetic. A 21-gauge needle was used to inject 4cc of 1% lidocaine and 1cc of 40mg/ml methylprednisolone into the knee. A sterile bandage was then applied. The patient tolerated the procedure well and there were no complications. '   I've asked him to obtain all medical records regarding his left knee and prior infection prior to any further discussion of treatment of his left knee all questions are answered

## 2020-12-16 ENCOUNTER — APPOINTMENT (OUTPATIENT)
Dept: ORTHOPEDIC SURGERY | Facility: CLINIC | Age: 60
End: 2020-12-16
Payer: MEDICAID

## 2020-12-30 ENCOUNTER — APPOINTMENT (OUTPATIENT)
Dept: ORTHOPEDIC SURGERY | Facility: CLINIC | Age: 60
End: 2020-12-30
Payer: MEDICAID

## 2020-12-30 PROCEDURE — 99072 ADDL SUPL MATRL&STAF TM PHE: CPT

## 2020-12-30 PROCEDURE — 99213 OFFICE O/P EST LOW 20 MIN: CPT

## 2020-12-30 NOTE — PHYSICAL EXAM
[de-identified] : left knee exam\par \par Skin: Clean, dry, intact\par Inspection: No obvious malalignment, no masses, no swelling, no effusion.\par Tenderness: no MJLT. No LJLT. No tenderness over the medial and lateral patella facets. No ttp medial/lateral epicondyle, patella tendon, tibial tubercle, pes anserinus, or proximal fibula.\par ROM: 0 to 130° no pain with deep flexion in both knees\par Stability: Stable to varus, valgus, lachman testing. Negative anterior/posterior drawer.\par Additional tests: Negative McMurrays test, Negative patellar grind test. \par Strength: 5/5 Q/H/TA/GS/EHL, no atrophy\par Neuro: In tact to light touch throughout in dp/sp/tib/jorge a/saph nerve districutions, DTR's normal\par Pulses: 2+ DP/PT pulses.\par \par right knee exam\par \par Skin: Clean, dry, intact\par Inspection: No obvious malalignment, no masses, no swelling, no effusion.\par Tenderness: no MJLT. No LJLT. No tenderness over the medial and lateral patella facets. No ttp medial/lateral epicondyle, patella tendon, tibial tubercle, pes anserinus, or proximal fibula.\par ROM: 0 to 130° no pain with deep flexion in both knees\par Stability: Stable to varus, valgus, lachman testing. Negative anterior/posterior drawer.\par Additional tests: Negative McMurrays test, Negative patellar grind test. \par Strength: 5/5 Q/H/TA/GS/EHL, no atrophy\par Neuro: In tact to light touch throughout in dp/sp/tib/jorge a/saph nerve districutions, DTR's normal\par Pulses: 2+ DP/PT pulses.

## 2020-12-30 NOTE — DISCUSSION/SUMMARY
[de-identified] : I discussed the treatment of degenerative arthritis with the patient at length today. I described the spectrum of treatment from nonoperative modalities to total joint arthroplasty. Noninvasive and nonoperative treatment modalities include weight reduction, activity modification with low impact exercise, PRN use of acetaminophen or anti-inflammatory medication if tolerated, glucosamine/chondroitin supplements, and physical therapy. Further treatments can include corticosteroid injection and the use of hyaluronic acid injections. Definitive treatment can certainly include total joint arthroplasty also. The risks and benefits of each treatment options was discussed and all questions were answered. refer to arthroplasty

## 2020-12-30 NOTE — HISTORY OF PRESENT ILLNESS
[de-identified] : 60 y.o M presents to the office complaining of 2+ years of bilateral knee pain, R worse than L. He was previously told he has knee arthritis. He obtained 3x cortisone injections that provided him with about 1 month of pain relief. He presents with x-rays from [facility] from Dec 2019. He complains of pain and difficulty with ambulation. Takes tramadol and NSAIDs for knee pain. He reports having had corticosteroid injections as he septic left knee after an injection done at [facility] \par \par He reports no relief from the corticosteroid injection to his right knee. He did not obtain the medical records in regards to his left

## 2021-03-08 ENCOUNTER — APPOINTMENT (OUTPATIENT)
Dept: ORTHOPEDIC SURGERY | Facility: CLINIC | Age: 61
End: 2021-03-08
Payer: MEDICAID

## 2021-03-08 VITALS
WEIGHT: 310 LBS | HEIGHT: 72 IN | SYSTOLIC BLOOD PRESSURE: 147 MMHG | DIASTOLIC BLOOD PRESSURE: 74 MMHG | HEART RATE: 101 BPM | BODY MASS INDEX: 41.99 KG/M2

## 2021-03-08 DIAGNOSIS — M17.0 BILATERAL PRIMARY OSTEOARTHRITIS OF KNEE: ICD-10-CM

## 2021-03-08 PROCEDURE — 99214 OFFICE O/P EST MOD 30 MIN: CPT

## 2021-03-08 PROCEDURE — 99072 ADDL SUPL MATRL&STAF TM PHE: CPT

## 2021-03-08 PROCEDURE — 73564 X-RAY EXAM KNEE 4 OR MORE: CPT | Mod: RT

## 2021-03-08 RX ORDER — TRAMADOL HYDROCHLORIDE 50 MG/1
50 TABLET, COATED ORAL
Qty: 14 | Refills: 0 | Status: DISCONTINUED | COMMUNITY
Start: 2020-09-14

## 2021-03-08 RX ORDER — VALSARTAN 160 MG/1
160 TABLET, COATED ORAL
Qty: 90 | Refills: 0 | Status: DISCONTINUED | COMMUNITY
Start: 2020-11-16

## 2021-03-08 RX ORDER — METOPROLOL SUCCINATE 50 MG/1
50 TABLET, EXTENDED RELEASE ORAL
Qty: 30 | Refills: 0 | Status: DISCONTINUED | COMMUNITY
Start: 2021-02-21

## 2021-03-08 RX ORDER — MAGNESIUM OXIDE 241.3 MG/1000MG
400 TABLET ORAL
Qty: 60 | Refills: 0 | Status: DISCONTINUED | COMMUNITY
Start: 2020-09-16

## 2021-03-08 RX ORDER — VALSARTAN 40 MG/1
40 TABLET, COATED ORAL
Qty: 90 | Refills: 0 | Status: DISCONTINUED | COMMUNITY
Start: 2020-10-01

## 2021-03-08 RX ORDER — TORSEMIDE 20 MG/1
20 TABLET ORAL
Qty: 360 | Refills: 0 | Status: DISCONTINUED | COMMUNITY
Start: 2021-02-25

## 2021-03-08 RX ORDER — POTASSIUM CHLORIDE 1500 MG/1
20 TABLET, EXTENDED RELEASE ORAL
Qty: 60 | Refills: 0 | Status: DISCONTINUED | COMMUNITY
Start: 2021-02-21

## 2021-03-08 RX ORDER — METOPROLOL SUCCINATE 25 MG/1
25 TABLET, EXTENDED RELEASE ORAL
Qty: 90 | Refills: 0 | Status: DISCONTINUED | COMMUNITY
Start: 2020-09-16

## 2021-03-08 RX ORDER — VALSARTAN 320 MG/1
320 TABLET, COATED ORAL
Qty: 30 | Refills: 0 | Status: DISCONTINUED | COMMUNITY
Start: 2020-09-16

## 2021-03-08 RX ORDER — ERGOCALCIFEROL 1.25 MG/1
1.25 MG CAPSULE, LIQUID FILLED ORAL
Qty: 4 | Refills: 0 | Status: DISCONTINUED | COMMUNITY
Start: 2020-09-23

## 2021-03-08 RX ORDER — NIFEDIPINE 30 MG/1
30 TABLET, EXTENDED RELEASE ORAL
Qty: 90 | Refills: 0 | Status: DISCONTINUED | COMMUNITY
Start: 2020-10-01

## 2021-03-08 NOTE — PHYSICAL EXAM
[de-identified] : Patient is well nourished, well-developed, in no acute distress, with appropriate mood and affect. The patient is oriented to time, place, and person. Respirations are even and unlabored. Gait evaluation does reveal a limp. There is no inguinal adenopathy. Bilateral limbs are well-perfused, without skin lesions, shows a grossly normal motor and sensory examination. The right knee motion is significantly reduced and does cause significant pain. The right knee moves from 0 to 125 degrees. The knee is stable within that range-of-motion to AP and ML stress. The alignment of the knee is 5 degrees varus. Muscle strength is normal. Pedal pulses are palpable. Hip examination was negative. The left knee motion is significantly reduced and does cause significant pain. The left knee moves from 0 to 125 degrees. The knee is stable within that range-of-motion to AP and ML stress. The alignment of the knee is 5 degrees varus. Muscle strength is normal. Pedal pulses are palpable. Hip examination was negative. [de-identified] : Long standing knee, AP knee, lateral knee, and patellar views of the right knee were ordered and taken in the office and demonstrate severe degenerative joint disease of the knee with joint space narrowing, osteophyte formation, and subchondral sclerosis.  Distal femur enchondroma noted.\par \par Long standing knee, AP knee, lateral knee, and patellar views of the left knee were ordered and taken in the office and demonstrate mild degenerative joint disease of the knee with joint space narrowing, osteophyte formation, and subchondral sclerosis.  Distal femur enchondroma noted.

## 2021-03-08 NOTE — HISTORY OF PRESENT ILLNESS
[de-identified] : This is very nice 60-year-old gentleman experiencing chronic right knee pain, which is severe in intensity for more than 3 months. The pain substantially limits activities of daily living. Walking tolerance is reduced. Medication including NSAIDs and activity modification have been minimally effective for a period lasting greater than three months in duration.  He has tried intra-articular cortisone injections which have only helped for maximum 2 weeks in the past.  Hyaluronic acid injections were rejected by his insurance company.  He ambulates with a cane.  He has venous stasis changes in the legs.  He uses compression stockings.  History of a left knee native septic joint after cortisone injection status post irrigation debridement.  The patient denies any radiation of the pain to the feet and it is not associated with numbness, tingling, or weakness.  BMI is 42.

## 2021-03-08 NOTE — DISCUSSION/SUMMARY
[de-identified] : This patient has bilateral knee osteoarthritis.  The patient is not an appropriate candidate for surgical intervention at this time. An extensive discussion was conducted on the natural history of the disease and the variety of surgical and non-surgical options available to the patient including, but not limited to non-steroidal anti-inflammatory medications, steroid injections, physical therapy, maintenance of ideal body weight, and reduction of activity.  Weight loss recommended.  Needs to be his BMI below 35 to proceed with surgery.  We will need to work with his primary care physician on a referral to vascular medicine for his venous insufficiency and venous stasis changes.  Discussed the significant increased risk of periprosthetic joint infection if you proceed to surgery at this time.\par The patient will schedule an appointment as needed.\par \par The patient has been counseled regarding the elevated risks associated with surgical complications in patients with a BMI>35.  I explained to the patient the risk of obesity, which is well documented in the orthopedic literature as increasing risks of wound breakdown (dehiscence), drainage, periprosthetic joint infection, dissatisfaction, chronic pain, early failure and/or loosening of the prosthesis, and impaired overall outcome to the patient. The patient demonstrates a profound understanding of the increased risk. Nutritionist referral, methods of monitoring nutrition intake and calorie counting and bariatric surgery options were discussed with the patient. After a lengthy discussion, the patient agreed to make a coordinated effort at weight loss. The patient understands our BMI policy and if they are planning surgical intervention, then they will need to bring their BMI below 40 in order to proceed and they are agreeable to this.

## 2021-04-24 ENCOUNTER — NON-APPOINTMENT (OUTPATIENT)
Age: 61
End: 2021-04-24

## 2021-05-01 NOTE — REASON FOR VISIT
Left message for patient to return call to reschedule appointment per her request.   no chest pain, no shortness of breath no [Initial Evaluation] : an initial evaluation [Spouse] : spouse [FreeTextEntry1] : perforated duodenal ulcer

## 2021-09-14 ENCOUNTER — RX RENEWAL (OUTPATIENT)
Age: 61
End: 2021-09-14

## 2021-10-18 NOTE — CHART NOTE - NSCHARTNOTEFT_GEN_A_CORE
Upon Nutritional Assessment by the Registered Dietitian your patient was determined to meet criteria / has evidence of the following diagnosis/diagnoses:          [ ]  Mild Protein Calorie Malnutrition        [ ]  Moderate Protein Calorie Malnutrition        [x ] Severe Protein Calorie Malnutrition  ACUTE        [ ] Unspecified Protein Calorie Malnutrition        [ ] Underweight / BMI <19        [ ] Morbid Obesity / BMI > 40      Findings as based on:  •  Comprehensive nutrition assessment and consultation  •  Calorie counts (nutrient intake analysis)  •  Food acceptance and intake status from observations by staff  •  Follow up  •  Patient education  •  Intervention secondary to interdisciplinary rounds  •   concerns      Treatment:    The following diet has been recommended:  advance diet as tolerated. Initiate clears then --->>dash, low residue      PROVIDER Section:     By signing this assessment you are acknowledging and agree with the diagnosis/diagnoses assigned by the Registered Dietitian    Comments: Declines

## 2021-11-26 ENCOUNTER — RX RENEWAL (OUTPATIENT)
Age: 61
End: 2021-11-26

## 2022-02-02 NOTE — PROVIDER CONTACT NOTE (CRITICAL VALUE NOTIFICATION) - DATE AND TIME:
19-Oct-2018 04:03 Left Pupillary Line Units: 0 Show Lateral Platysmal Band Units: Yes Show Right And Left Periorbital Units: No Post-Care Instructions: Patient instructed to not lie down for 4 hours and limit physical activity for 24 hours. Follow up 7 days if needed. Detail Level: Detailed 19-Oct-2018 04:04 Price (Use Numbers Only, No Special Characters Or $): 396 Expiration Date (Month Year): 9/22 Additional Area 1 Location: lip Consent: Written consent obtained. Risks include but not limited to lid/brow ptosis, bruising, swelling, diplopia, temporary effect, incomplete chemical denervation. Dilution (U/0.1 Cc): 10 Lot #: [id number] Forehead Units: 17.5 Glabellar Complex Units: 65

## 2022-05-23 ENCOUNTER — RX RENEWAL (OUTPATIENT)
Age: 62
End: 2022-05-23

## 2022-06-22 ENCOUNTER — RX RENEWAL (OUTPATIENT)
Age: 62
End: 2022-06-22

## 2022-07-18 ENCOUNTER — RX RENEWAL (OUTPATIENT)
Age: 62
End: 2022-07-18

## 2022-08-22 ENCOUNTER — NON-APPOINTMENT (OUTPATIENT)
Age: 62
End: 2022-08-22

## 2022-08-23 RX ORDER — DICLOFENAC SODIUM 1% 10 MG/G
1 GEL TOPICAL
Qty: 100 | Refills: 0 | Status: DISCONTINUED | COMMUNITY
Start: 2022-08-23

## 2022-08-25 ENCOUNTER — RX RENEWAL (OUTPATIENT)
Age: 62
End: 2022-08-25

## 2022-09-15 ENCOUNTER — RX RENEWAL (OUTPATIENT)
Age: 62
End: 2022-09-15

## 2022-09-20 ENCOUNTER — RX RENEWAL (OUTPATIENT)
Age: 62
End: 2022-09-20

## 2022-10-25 ENCOUNTER — RX RENEWAL (OUTPATIENT)
Age: 62
End: 2022-10-25

## 2022-11-14 ENCOUNTER — NON-APPOINTMENT (OUTPATIENT)
Age: 62
End: 2022-11-14

## 2022-12-19 ENCOUNTER — RX RENEWAL (OUTPATIENT)
Age: 62
End: 2022-12-19

## 2023-03-24 ENCOUNTER — RX RENEWAL (OUTPATIENT)
Age: 63
End: 2023-03-24

## 2023-04-11 ENCOUNTER — RX RENEWAL (OUTPATIENT)
Age: 63
End: 2023-04-11

## 2023-04-11 RX ORDER — DICLOFENAC SODIUM 1% 10 MG/G
1 GEL TOPICAL
Qty: 100 | Refills: 0 | Status: ACTIVE | COMMUNITY
Start: 2022-11-15 | End: 1900-01-01

## 2023-09-22 PROBLEM — I10 HTN (HYPERTENSION): Status: ACTIVE | Noted: 2023-09-22

## 2023-09-22 PROBLEM — M25.462 EFFUSION OF BOTH KNEE JOINTS: Status: ACTIVE | Noted: 2023-09-22

## 2023-09-22 PROBLEM — E03.9 ADULT HYPOTHYROIDISM: Status: ACTIVE | Noted: 2023-09-22

## 2023-09-22 PROBLEM — M17.11 PRIMARY OSTEOARTHRITIS OF RIGHT KNEE: Status: ACTIVE | Noted: 2023-09-22

## 2023-09-22 PROBLEM — M17.12 PRIMARY OSTEOARTHRITIS OF LEFT KNEE: Status: ACTIVE | Noted: 2023-09-22

## 2023-09-22 PROBLEM — G47.30 SLEEP APNEA: Status: ACTIVE | Noted: 2023-09-22

## 2023-09-22 PROBLEM — M25.461 EFFUSION OF BOTH KNEE JOINTS: Status: ACTIVE | Noted: 2023-09-22

## 2023-09-22 PROBLEM — R06.02 SHORTNESS OF BREATH: Status: ACTIVE | Noted: 2023-09-22

## 2023-09-22 PROBLEM — M19.90 OSTEOARTHRITIS: Status: ACTIVE | Noted: 2023-09-22

## 2023-09-22 PROBLEM — M25.561 KNEE PAIN, BILATERAL: Status: ACTIVE | Noted: 2023-09-22

## 2023-09-22 PROBLEM — E78.5 HYPERLIPIDEMIA: Status: ACTIVE | Noted: 2023-09-22

## 2023-09-22 PROBLEM — M25.562 KNEE PAIN, BILATERAL: Status: ACTIVE | Noted: 2023-09-22

## 2023-09-22 PROBLEM — R00.2 PALPITATIONS: Status: ACTIVE | Noted: 2023-09-22

## 2023-09-22 RX ORDER — VALSARTAN 80 MG/1
1 TABLET ORAL DAILY
COMMUNITY
Start: 2021-07-15 | End: 2023-10-30 | Stop reason: SDUPTHER

## 2023-09-22 RX ORDER — LEVOTHYROXINE SODIUM 100 UG/1
1 TABLET ORAL DAILY
COMMUNITY
Start: 2021-10-25 | End: 2023-10-23 | Stop reason: SDUPTHER

## 2023-09-22 RX ORDER — ATORVASTATIN CALCIUM 20 MG/1
20 TABLET, FILM COATED ORAL NIGHTLY
COMMUNITY
End: 2024-01-11

## 2023-09-22 RX ORDER — PANTOPRAZOLE SODIUM 40 MG/1
1 TABLET, DELAYED RELEASE ORAL DAILY
COMMUNITY
Start: 2021-12-14 | End: 2023-11-20 | Stop reason: SDUPTHER

## 2023-09-22 RX ORDER — TORSEMIDE 20 MG/1
1 TABLET ORAL 2 TIMES DAILY
COMMUNITY
Start: 2021-07-15 | End: 2024-01-23 | Stop reason: SDUPTHER

## 2023-09-22 RX ORDER — MELOXICAM 7.5 MG/1
1 TABLET ORAL DAILY PRN
COMMUNITY
End: 2023-10-23

## 2023-09-22 RX ORDER — DOCUSATE SODIUM 100 MG/1
1 CAPSULE, LIQUID FILLED ORAL 2 TIMES DAILY
COMMUNITY
Start: 2021-12-14 | End: 2023-12-18

## 2023-09-22 RX ORDER — TORSEMIDE 20 MG/1
40 TABLET ORAL 2 TIMES DAILY
COMMUNITY
End: 2024-01-23 | Stop reason: SDUPTHER

## 2023-09-22 RX ORDER — METOPROLOL SUCCINATE 50 MG/1
1 TABLET, EXTENDED RELEASE ORAL DAILY
COMMUNITY
Start: 2022-07-23

## 2023-09-27 NOTE — DISCHARGE NOTE ADULT - THE PATIENT HAS
no difficulties Sski Pregnancy And Lactation Text: This medication is Pregnancy Category D and isn't considered safe during pregnancy. It is excreted in breast milk.

## 2023-10-05 ENCOUNTER — APPOINTMENT (OUTPATIENT)
Dept: ENDOCRINOLOGY | Facility: CLINIC | Age: 63
End: 2023-10-05
Payer: COMMERCIAL

## 2023-10-05 ENCOUNTER — CLINICAL SUPPORT (OUTPATIENT)
Dept: ORTHOPEDIC SURGERY | Facility: CLINIC | Age: 63
End: 2023-10-05
Payer: COMMERCIAL

## 2023-10-05 DIAGNOSIS — M17.0 PRIMARY OSTEOARTHRITIS OF BOTH KNEES: Primary | ICD-10-CM

## 2023-10-05 DIAGNOSIS — M25.462 EFFUSION OF BOTH KNEE JOINTS: ICD-10-CM

## 2023-10-05 DIAGNOSIS — M25.461 EFFUSION OF BOTH KNEE JOINTS: ICD-10-CM

## 2023-10-05 PROCEDURE — 20611 DRAIN/INJ JOINT/BURSA W/US: CPT | Performed by: STUDENT IN AN ORGANIZED HEALTH CARE EDUCATION/TRAINING PROGRAM

## 2023-10-05 NOTE — PROGRESS NOTES
#1 Euflexxa inj pt prov B/L knee    Overall doing well, no new injury or trauma to the bilateral knees.  States that previous round of viscosupplementation provided him with 5+ of relief.  Both knees are equally painful.    Physical exam:  The RIGHT knee has trace to grade 1 joint effusion. Patella crepitus and grind are positive. There is minimal tenderness to the medial and lateral joint lines. Flexion and extension are without mechanical blocking. There is no instability with stress testing.    The LEFT knee has trace to grade 1 joint effusion. Patella crepitus and grind are positive. There is minimal tenderness to the medial and lateral joint lines. Flexion and extension are without mechanical blocking. There is no instability with stress testing.    Skin - no rashes, sores, or open lesions at the knee.  Chronic stasis dermatitis bilateral lower extremities.. Strength, sensory and vascular exams are otherwise normal.  1+ pitting edema bilateral lower extremities.    Patient ID: Patrick Valdovinos is a 63 y.o. male.    L Inj/Asp: bilateral knee on 10/5/2023 12:34 PM  Indications: pain  Details: 18 G needle, ultrasound-guided superolateral approach  Medications (Right): 20 mg sodium hyaluronate 10 mg/mL(mw 2.4 -3.6 million)  Aspirate (Right): 45 mL yellow and clear  Medications (Left): 20 mg sodium hyaluronate 10 mg/mL(mw 2.4 -3.6 million)  Aspirate (Left): 46 mL clear and yellow  Outcome: tolerated well, no immediate complications  Procedure, treatment alternatives, risks and benefits explained, specific risks discussed. Consent was given by the patient. Immediately prior to procedure a time out was called to verify the correct patient, procedure, equipment, support staff and site/side marked as required. Patient was prepped and draped in the usual sterile fashion.       Ultrasound-guided bilateral knee viscosupplementation injection with Euflexxa.  Injection number 1 in a series of 3.  Risks, benefits, and  alternatives were explained to the patient and verbal and written consent was obtained.  The patient was placed in supine position with a bump under the knees for comfort.     Status post ultrasound-guided Visco supplementation injection with Euflexxa, #1 in a series of 3.  he was educated on the signs and symptoms of local reaction and infection and should call the office if he experiences any of these. he should take it easy on the hip for the next 24 to 48 hours, rest, ice, and anti-inflammatories. After that, he can return to his normal activity.  he understands that this is not a cure, but an attempt to buy some time, decrease his discomfort, and slow the arthritic process. he may ultimately require surgery, but we will exhaust all of our conservative treatment options prior to that. he will follow-up in 1 week for the next injection in the series. All of his questions were answered and he agrees with the treatment plan.    Procedure Note Attestation  Procedure performed by:  Dr. Pola Galaviz D.O.  I supervised the entire procedure.    I saw and evaluated the patient.  I personally obtained the key and critical portions of the history and physical exam or was physically present for key and critical portions performed by the trainee.  I reviewed the trainee's documentation and discussed the patient with the trainee.  I agree with the trainee's medical decision making as documented on the trainee's note.    I reviewed this patient with  Dr. Pola Galaviz D.O.    Tobias Lovell M.D.  Clinical , Division of Sports Medicine  Primary Care Sports Medicine  Department of Orthopedic Surgery  [facility]

## 2023-10-12 ENCOUNTER — OFFICE VISIT (OUTPATIENT)
Dept: ORTHOPEDIC SURGERY | Facility: CLINIC | Age: 63
End: 2023-10-12
Payer: COMMERCIAL

## 2023-10-12 DIAGNOSIS — M25.462 EFFUSION, LEFT KNEE: ICD-10-CM

## 2023-10-12 DIAGNOSIS — M17.0 ARTHRITIS OF BOTH KNEES: Primary | ICD-10-CM

## 2023-10-12 DIAGNOSIS — M25.461 EFFUSION, RIGHT KNEE: ICD-10-CM

## 2023-10-12 PROCEDURE — 20611 DRAIN/INJ JOINT/BURSA W/US: CPT | Performed by: FAMILY MEDICINE

## 2023-10-12 NOTE — PROGRESS NOTES
#2 Euflexxa inj pt prov B/L knee    Patrick is doing well after his first injection with viscosupplementation injections, #1 in a series of 3 with Euflexxa.  He denies any new injury or trauma to the knees.  He is comfortable moving forward with the next injection in the series.    L Inj/Asp: bilateral knee on 10/12/2023 1:11 PM  Indications: pain and joint swelling  Details: 22 G needle, ultrasound-guided superolateral approach  Medications (Right): 20 mg sodium hyaluronate 10 mg/mL(mw 2.4 -3.6 million)  Medications (Left): 20 mg sodium hyaluronate 10 mg/mL(mw 2.4 -3.6 million)    Ultrasound-guided bilateral knee viscosupplementation injection with Euflexxa.  Injection number 2 in a series of 3.  Risks, benefits, and alternatives were explained to the patient and verbal and written consent was obtained.  The patient was placed in supine position with a bump under the knees for comfort.     Status post ultrasound-guided Visco supplementation injection with Euflexxa, #2 in a series of 3.  he was educated on the signs and symptoms of local reaction and infection and should call the office if he experiences any of these. he should take it easy on the hip for the next 24 to 48 hours, rest, ice, and anti-inflammatories. After that, he can return to his normal activity.  he understands that this is not a cure, but an attempt to buy some time, decrease his discomfort, and slow the arthritic process. he may ultimately require surgery, but we will exhaust all of our conservative treatment options prior to that. he will follow-up in 1 week for the next injection in the series. All of his questions were answered and he agrees with the treatment plan.    ** Please excuse any errors in grammar or translation related to this dictation. Voice recognition software was utilized to prepare this document. **    Tobias Lovell M.D.  Clinical , Division of Sports Medicine  Primary Care Sports Medicine  Department  of Orthopedic Surgery  [facility]      Procedure, treatment alternatives, risks and benefits explained, specific risks discussed. Immediately prior to procedure a time out was called to verify the correct patient, procedure, equipment, support staff and site/side marked as required. Patient was prepped and draped in the usual sterile fashion.

## 2023-10-13 RX ORDER — DICLOFENAC SODIUM 10 MG/G
GEL TOPICAL
Qty: 100 G | Refills: 1 | Status: SHIPPED | OUTPATIENT
Start: 2023-10-13 | End: 2023-11-13

## 2023-10-19 ENCOUNTER — APPOINTMENT (OUTPATIENT)
Dept: ORTHOPEDIC SURGERY | Facility: CLINIC | Age: 63
End: 2023-10-19
Payer: COMMERCIAL

## 2023-10-20 RX ORDER — HYALURONATE SODIUM 20 MG/2 ML
SYRINGE (ML) INTRAARTICULAR
COMMUNITY
Start: 2023-09-25

## 2023-10-23 ENCOUNTER — LAB (OUTPATIENT)
Dept: LAB | Facility: LAB | Age: 63
End: 2023-10-23
Payer: COMMERCIAL

## 2023-10-23 ENCOUNTER — OFFICE VISIT (OUTPATIENT)
Dept: CARDIOLOGY | Facility: CLINIC | Age: 63
End: 2023-10-23
Payer: COMMERCIAL

## 2023-10-23 VITALS
BODY MASS INDEX: 39.82 KG/M2 | OXYGEN SATURATION: 93 % | HEIGHT: 72 IN | SYSTOLIC BLOOD PRESSURE: 128 MMHG | HEART RATE: 61 BPM | WEIGHT: 294 LBS | DIASTOLIC BLOOD PRESSURE: 70 MMHG

## 2023-10-23 DIAGNOSIS — E03.9 HYPOTHYROIDISM, UNSPECIFIED TYPE: ICD-10-CM

## 2023-10-23 DIAGNOSIS — R06.02 SHORTNESS OF BREATH: ICD-10-CM

## 2023-10-23 DIAGNOSIS — I10 HYPERTENSION, UNSPECIFIED TYPE: Primary | ICD-10-CM

## 2023-10-23 DIAGNOSIS — I10 HYPERTENSION, UNSPECIFIED TYPE: ICD-10-CM

## 2023-10-23 LAB
ALBUMIN SERPL BCP-MCNC: 4.3 G/DL (ref 3.4–5)
ALP SERPL-CCNC: 77 U/L (ref 33–136)
ALT SERPL W P-5'-P-CCNC: 12 U/L (ref 10–52)
ANION GAP SERPL CALC-SCNC: 21 MMOL/L (ref 10–20)
AST SERPL W P-5'-P-CCNC: 11 U/L (ref 9–39)
BILIRUB SERPL-MCNC: 0.6 MG/DL (ref 0–1.2)
BNP SERPL-MCNC: 22 PG/ML (ref 0–99)
BUN SERPL-MCNC: 25 MG/DL (ref 6–23)
CALCIUM SERPL-MCNC: 9.8 MG/DL (ref 8.6–10.6)
CHLORIDE SERPL-SCNC: 99 MMOL/L (ref 98–107)
CHOLEST SERPL-MCNC: 139 MG/DL (ref 0–199)
CHOLESTEROL/HDL RATIO: 2.9
CO2 SERPL-SCNC: 24 MMOL/L (ref 21–32)
CREAT SERPL-MCNC: 1.54 MG/DL (ref 0.5–1.3)
ERYTHROCYTE [DISTWIDTH] IN BLOOD BY AUTOMATED COUNT: 13.7 % (ref 11.5–14.5)
GFR SERPL CREATININE-BSD FRML MDRD: 50 ML/MIN/1.73M*2
GLUCOSE SERPL-MCNC: 110 MG/DL (ref 74–99)
HCT VFR BLD AUTO: 44.5 % (ref 41–52)
HDLC SERPL-MCNC: 48.2 MG/DL
HGB BLD-MCNC: 14.1 G/DL (ref 13.5–17.5)
LDLC SERPL CALC-MCNC: 59 MG/DL
MCH RBC QN AUTO: 30.2 PG (ref 26–34)
MCHC RBC AUTO-ENTMCNC: 31.7 G/DL (ref 32–36)
MCV RBC AUTO: 95 FL (ref 80–100)
NON HDL CHOLESTEROL: 91 MG/DL (ref 0–149)
NRBC BLD-RTO: 0 /100 WBCS (ref 0–0)
PLATELET # BLD AUTO: 314 X10*3/UL (ref 150–450)
PMV BLD AUTO: 9 FL (ref 7.5–11.5)
POTASSIUM SERPL-SCNC: 4 MMOL/L (ref 3.5–5.3)
PROT SERPL-MCNC: 7.8 G/DL (ref 6.4–8.2)
RBC # BLD AUTO: 4.67 X10*6/UL (ref 4.5–5.9)
SODIUM SERPL-SCNC: 140 MMOL/L (ref 136–145)
TRIGL SERPL-MCNC: 157 MG/DL (ref 0–149)
TSH SERPL-ACNC: 2.47 MIU/L (ref 0.44–3.98)
VLDL: 31 MG/DL (ref 0–40)
WBC # BLD AUTO: 6.3 X10*3/UL (ref 4.4–11.3)

## 2023-10-23 PROCEDURE — 85027 COMPLETE CBC AUTOMATED: CPT

## 2023-10-23 PROCEDURE — 93005 ELECTROCARDIOGRAM TRACING: CPT | Performed by: INTERNAL MEDICINE

## 2023-10-23 PROCEDURE — 83880 ASSAY OF NATRIURETIC PEPTIDE: CPT

## 2023-10-23 PROCEDURE — 1036F TOBACCO NON-USER: CPT | Performed by: INTERNAL MEDICINE

## 2023-10-23 PROCEDURE — 3078F DIAST BP <80 MM HG: CPT | Performed by: INTERNAL MEDICINE

## 2023-10-23 PROCEDURE — 93010 ELECTROCARDIOGRAM REPORT: CPT | Performed by: INTERNAL MEDICINE

## 2023-10-23 PROCEDURE — 99214 OFFICE O/P EST MOD 30 MIN: CPT | Performed by: INTERNAL MEDICINE

## 2023-10-23 PROCEDURE — 80053 COMPREHEN METABOLIC PANEL: CPT

## 2023-10-23 PROCEDURE — 3074F SYST BP LT 130 MM HG: CPT | Performed by: INTERNAL MEDICINE

## 2023-10-23 PROCEDURE — 36415 COLL VENOUS BLD VENIPUNCTURE: CPT

## 2023-10-23 PROCEDURE — 80061 LIPID PANEL: CPT

## 2023-10-23 PROCEDURE — 84443 ASSAY THYROID STIM HORMONE: CPT

## 2023-10-23 RX ORDER — LEVOTHYROXINE SODIUM 100 UG/1
100 TABLET ORAL DAILY
Qty: 30 TABLET | Refills: 11 | Status: SHIPPED | OUTPATIENT
Start: 2023-10-23 | End: 2024-05-01

## 2023-10-23 ASSESSMENT — PAIN SCALES - GENERAL: PAINLEVEL: 7

## 2023-10-23 ASSESSMENT — PATIENT HEALTH QUESTIONNAIRE - PHQ9
1. LITTLE INTEREST OR PLEASURE IN DOING THINGS: NOT AT ALL
SUM OF ALL RESPONSES TO PHQ9 QUESTIONS 1 AND 2: 0
2. FEELING DOWN, DEPRESSED OR HOPELESS: NOT AT ALL

## 2023-10-23 ASSESSMENT — COLUMBIA-SUICIDE SEVERITY RATING SCALE - C-SSRS
1. IN THE PAST MONTH, HAVE YOU WISHED YOU WERE DEAD OR WISHED YOU COULD GO TO SLEEP AND NOT WAKE UP?: NO
6. HAVE YOU EVER DONE ANYTHING, STARTED TO DO ANYTHING, OR PREPARED TO DO ANYTHING TO END YOUR LIFE?: NO
2. HAVE YOU ACTUALLY HAD ANY THOUGHTS OF KILLING YOURSELF?: NO

## 2023-10-23 ASSESSMENT — ENCOUNTER SYMPTOMS
LOSS OF SENSATION IN FEET: 0
OCCASIONAL FEELINGS OF UNSTEADINESS: 0
DEPRESSION: 0

## 2023-10-23 NOTE — PROGRESS NOTES
Primary Care Physician: Diogenes Villalobos MD  Date of Visit: 10/23/2023  1:40 PM EDT  Location of visit: [facility] [address]     Chief Complaint:   Chief Complaint   Patient presents with    Follow-up        HPI / Summary:   Patrick Valdovinos is a 63 y.o. male presents for followup.  6-month office follow-up visit for this very pleasant retired patient.  He has a history of CKD 3, elevated BMI, hypertension, paroxysmal atrial fibrillation, dyslipidemia.  Nondiabetic severe osteoarthritis, heart failure with preserved EF.  Not following regularly with the PCP requests renewal for his thyroid medicines.  He is concerned his blood pressures remain elevated over the last few weeks.  Notices some effort related dyspnea but no chest discomfort        Specialty Problems          Cardiology Problems    HTN (hypertension)    Hyperlipidemia    Palpitations        No past medical history on file.       No past surgical history on file.       Social History:   reports that he quit smoking about 5 years ago. His smoking use included cigarettes. He has a 3.75 pack-year smoking history. He has never used smokeless tobacco. He reports current alcohol use of about 3.0 standard drinks of alcohol per week. He reports that he does not use drugs.      Allergies:  No Known Allergies    Outpatient Medications:  Current Outpatient Medications   Medication Instructions    apixaban (Eliquis) 5 mg tablet 1 tablet, oral, 2 times daily    atorvastatin (LIPITOR) 20 mg, oral, Nightly    cholecalciferol, vitamin D3, (VITAMIN D3 ORAL) 50,000 Units, oral, Every 7 days    diclofenac sodium (Voltaren) 1 % gel gel APPLY 4 GRAMS TO AFFECTED AREA FOUR TIMES DAILY. DO NOT APPLY MORE THAN 16 GRAMS TO ANY ONE JOINT    diclofenac sodium 4 g, Topical, 4 times daily,  DO NOT APPLY MORE THAN 16 GRAMS TO ANY ONE JOINT<BR>    docusate sodium (Colace) 100 mg capsule 1 capsule, oral, 2 times daily    Euflexxa 10 mg/mL(mw 2.4 -3.6 million) injection      "levothyroxine (SYNTHROID, LEVOXYL) 100 mcg, oral, Daily    meloxicam (Mobic) 7.5 mg tablet 1 tablet, oral, Daily PRN    metoprolol succinate XL (Toprol-XL) 50 mg 24 hr tablet 1 tablet, oral, Daily    pantoprazole (ProtoNix) 40 mg EC tablet 1 tablet, oral, Daily    torsemide (Demadex) 20 mg tablet 1 tablet, oral, 2 times daily    torsemide (DEMADEX) 40 mg, oral, 2 times daily    valsartan (Diovan) 80 mg tablet 1 tablet, oral, Daily       ROS     Physical Exam:  Vitals:    10/23/23 1359   BP: 128/70   BP Location: Left arm   Patient Position: Sitting   BP Cuff Size: Adult   Pulse: 61   SpO2: 93%   Weight: 133 kg (294 lb)   Height: 1.829 m (6')   Well-developed male no acute distress.  Difficult to  neck veins but I did not think they were distended.  Heart sounds are distant but no gallop or murmur.  Clear lungs without crackles.  Soft abdomen without masses.  Ankle edema trace with intact pedal pulses  Wt Readings from Last 5 Encounters:   10/23/23 133 kg (294 lb)   04/24/23 136 kg (300 lb 4 oz)   10/14/22 135 kg (298 lb)     Body mass index is 39.87 kg/m².        Last Labs:  CMP:  Recent Labs     11/01/22  1235      K 3.7   CL 98   CO2 28   ANIONGAP 15   BUN 18   CREATININE 1.70*   GLUCOSE 107*     Recent Labs     11/01/22  1235   ALBUMIN 4.5   ALKPHOS 84   ALT 12   AST 17   BILITOT 0.5     CBC:  Recent Labs     11/01/22  1235   WBC 5.2   HGB 15.3   HCT 47.7      MCV 93     COAG: No results for input(s): \"INR\", \"DDIMERVTE\" in the last 69170 hours.  HEME/ENDO:  Recent Labs     11/01/22  1235   TSH 2.94      CARDIAC:   Recent Labs     11/01/22  1235   BNP 39     Recent Labs     11/01/22  1235   CHOL 199  199   LDLF 114*   HDL 50.0  50.0   TRIG 176*       Last Cardiology Tests:  ECG:  ECG demonstrates sinus rhythm at 61 bpm, no pathological Q waves, no acute ST or T wave changes    Echo:  Echo Results:  No results found for this or any previous visit from the past 3650 days.       Cath:      Stress " Test:  Stress Results:  No results found for this or any previous visit from the past 365 days.         Cardiac Imaging:  OUTSIDE GENERIC TESTING  Ordered by an unspecified provider.        Assessment/Plan     62-year-old male here to establish cardiac care.  He is relocated from [facility].  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  He describes being in excellent health he was hospitalized in 2018 with a perforated DU and required acute cholecystectomy.  Course was complicated by respiratory failure requiring intubation, transient A. fib on anticoagulation converting back to sinus rhythm.  Had a left heart cath in January 2019 with nonobstructive disease he had an A. fib ablation.  Is also been diagnosed with MARCEL on CPAP as well as heart failure with preserved EF.    He does not appear more volume overloaded.  His blood pressure was about 148/76 a little more elevated than I would like it to be particularly in light of his CKD.  I oriented labs to include CBC, CMP, BNP, lipid with TSH and reflex T4.  I have renewed his thyroid prescription.  I strongly encouraged him to establish with a new PCP.  I will likely add another antihypertensive after reviewing his labs.  Follow-up 3 months  Orders:  Orders Placed This Encounter   Procedures    Comprehensive Metabolic Panel    Lipid Panel    B-Type Natriuretic Peptide    CBC    Thyroid Stimulating Hormone    ECG 12 lead (Clinic Performed)      Followup Appts:  Future Appointments   Date Time Provider Department Center   10/24/2023  1:45 PM Tobias Lovell MD [id number] [place]   11/2/2023  3:00 PM Rahel Pepe DPM [id number] [place]   1/23/2024  1:40 PM Javid Najera MD [id number] [place]           ____________________________________________________________  Javid Najera MD    Senior Attending Physician  [facility]  [facility]  [facility]

## 2023-10-24 ENCOUNTER — OFFICE VISIT (OUTPATIENT)
Dept: ORTHOPEDIC SURGERY | Facility: CLINIC | Age: 63
End: 2023-10-24
Payer: COMMERCIAL

## 2023-10-24 DIAGNOSIS — M17.0 ARTHRITIS OF BOTH KNEES: ICD-10-CM

## 2023-10-24 DIAGNOSIS — M17.0 PRIMARY OSTEOARTHRITIS OF BOTH KNEES: Primary | ICD-10-CM

## 2023-10-24 PROCEDURE — 1036F TOBACCO NON-USER: CPT | Performed by: FAMILY MEDICINE

## 2023-10-24 PROCEDURE — 20611 DRAIN/INJ JOINT/BURSA W/US: CPT | Performed by: FAMILY MEDICINE

## 2023-10-24 ASSESSMENT — PAIN SCALES - GENERAL: PAINLEVEL_OUTOF10: 0 - NO PAIN

## 2023-10-24 ASSESSMENT — PAIN - FUNCTIONAL ASSESSMENT: PAIN_FUNCTIONAL_ASSESSMENT: 0-10

## 2023-10-24 NOTE — PROGRESS NOTES
#3 Euflexxa pt prov B/L knee    Patrick returns for his third and final injection of viscosupplementation with Euflexxa to both knees.  He is doing very well and has had no setbacks.  He states his knees feel really good and he was able to get back on his treadmill.  He is comfortable moving forward with the last injection in the series.    Objective:  Musculoskeletal-bilateral knees have no significant change from previous visits.  He has mild bilateral effusions, but no warmth, redness, or skin changes.  Good range of motion.  Minimal crepitus with movement.  His ligamentous exam is stable bilaterally.    L Inj/Asp: bilateral knee on 10/24/2023 2:16 PM  Indications: pain  Details: 22 G needle, ultrasound-guided superolateral approach  Medications (Right): 20 mg sodium hyaluronate 10 mg/mL(mw 2.4 -3.6 million)  Medications (Left): 20 mg sodium hyaluronate 10 mg/mL(mw 2.4 -3.6 million)    Ultrasound-guided bilateral knee viscosupplementation injection with Euflexxa.  Injection number 3 in a series of 3.  Risks, benefits, and alternatives were explained to the patient and verbal and written consent was obtained.  The patient was placed in supine position with a bump under the knees for comfort.     Status post ultrasound-guided Visco supplementation injection with Euflexxa, #3  in a series of 3.  he was educated on the signs and symptoms of local reaction and infection and should call the office if he experiences any of these. he should take it easy on the hip for the next 24 to 48 hours, rest, ice, and anti-inflammatories. After that, he can return to his normal activity.  he understands that this is not a cure, but an attempt to buy some time, decrease his discomfort, and slow the arthritic process. he may ultimately require surgery, but we will exhaust all of our conservative treatment options prior to that.  At this point he has completed the series of injections.  His knees bother him again.  He should continue  with his home exercise program and current medication regimen.  All of his questions were answered and he agrees with the treatment plan.    ** Please excuse any errors in grammar or translation related to this dictation. Voice recognition software was utilized to prepare this document. **    Tobias Lovell M.D.  Clinical , Division of Sports Medicine  Primary Care Sports Medicine  Department of Orthopedic Surgery  [facility]      Procedure, treatment alternatives, risks and benefits explained, specific risks discussed. Consent was given by the patient. Immediately prior to procedure a time out was called to verify the correct patient, procedure, equipment, support staff and site/side marked as required. Patient was prepped and draped in the usual sterile fashion.

## 2023-10-25 ENCOUNTER — TELEPHONE (OUTPATIENT)
Dept: CARDIOLOGY | Facility: CLINIC | Age: 63
End: 2023-10-25
Payer: COMMERCIAL

## 2023-10-25 NOTE — TELEPHONE ENCOUNTER
----- Message from Javid Najera MD sent at 10/24/2023  7:44 AM EDT -----  Regarding: Lipid  Let him know liids look great same meds for now

## 2023-10-25 NOTE — TELEPHONE ENCOUNTER
Result Communication    Called pt to discuss lipid results per Dr Najera's instruction.     2:22 PM      Results were successfully communicated with the patient and they acknowledged their understanding.

## 2023-10-30 DIAGNOSIS — I10 HYPERTENSION, UNSPECIFIED TYPE: Primary | ICD-10-CM

## 2023-10-30 LAB
ATRIAL RATE: 61 BPM
P AXIS: 71 DEGREES
P OFFSET: 183 MS
P ONSET: 127 MS
PR INTERVAL: 188 MS
Q ONSET: 221 MS
QRS COUNT: 10 BEATS
QRS DURATION: 100 MS
QT INTERVAL: 420 MS
QTC CALCULATION(BAZETT): 422 MS
QTC FREDERICIA: 422 MS
R AXIS: 22 DEGREES
T AXIS: 19 DEGREES
T OFFSET: 431 MS
VENTRICULAR RATE: 61 BPM

## 2023-10-30 RX ORDER — VALSARTAN 80 MG/1
80 TABLET ORAL DAILY
Qty: 90 TABLET | Refills: 3 | Status: SHIPPED | OUTPATIENT
Start: 2023-10-30 | End: 2024-01-23

## 2023-11-02 ENCOUNTER — APPOINTMENT (OUTPATIENT)
Dept: PODIATRY | Facility: CLINIC | Age: 63
End: 2023-11-02
Payer: COMMERCIAL

## 2023-11-15 ENCOUNTER — APPOINTMENT (OUTPATIENT)
Dept: RADIOLOGY | Facility: CLINIC | Age: 63
End: 2023-11-15
Payer: COMMERCIAL

## 2023-11-20 DIAGNOSIS — I48.91 ATRIAL FIBRILLATION, UNSPECIFIED TYPE (MULTI): Primary | ICD-10-CM

## 2023-11-20 DIAGNOSIS — K21.9 GASTROESOPHAGEAL REFLUX DISEASE, UNSPECIFIED WHETHER ESOPHAGITIS PRESENT: ICD-10-CM

## 2023-11-20 RX ORDER — PANTOPRAZOLE SODIUM 40 MG/1
40 TABLET, DELAYED RELEASE ORAL DAILY
Qty: 90 TABLET | Refills: 2 | Status: SHIPPED | OUTPATIENT
Start: 2023-11-20

## 2023-12-14 DIAGNOSIS — R06.02 SHORTNESS OF BREATH: Primary | ICD-10-CM

## 2023-12-14 RX ORDER — TORSEMIDE 20 MG/1
20 TABLET ORAL 2 TIMES DAILY
Qty: 180 TABLET | Refills: 3 | Status: SHIPPED | OUTPATIENT
Start: 2023-12-14 | End: 2024-02-02 | Stop reason: DRUGHIGH

## 2023-12-16 DIAGNOSIS — Z00.00 HEALTH CARE MAINTENANCE: ICD-10-CM

## 2023-12-18 RX ORDER — DOCUSATE SODIUM 100 MG/1
100 CAPSULE, LIQUID FILLED ORAL 2 TIMES DAILY
Qty: 180 CAPSULE | Refills: 0 | Status: SHIPPED | OUTPATIENT
Start: 2023-12-18 | End: 2024-05-01

## 2023-12-27 ENCOUNTER — APPOINTMENT (OUTPATIENT)
Dept: PRIMARY CARE | Facility: CLINIC | Age: 63
End: 2023-12-27
Payer: COMMERCIAL

## 2024-01-11 DIAGNOSIS — I10 ESSENTIAL (PRIMARY) HYPERTENSION: ICD-10-CM

## 2024-01-11 DIAGNOSIS — E78.5 HYPERLIPIDEMIA, UNSPECIFIED: ICD-10-CM

## 2024-01-11 RX ORDER — ATORVASTATIN CALCIUM 20 MG/1
20 TABLET, FILM COATED ORAL NIGHTLY
Qty: 30 TABLET | Refills: 3 | Status: SHIPPED | OUTPATIENT
Start: 2024-01-11 | End: 2024-05-28

## 2024-01-11 RX ORDER — TORSEMIDE 20 MG/1
40 TABLET ORAL 2 TIMES DAILY
Qty: 120 TABLET | Refills: 3 | Status: SHIPPED | OUTPATIENT
Start: 2024-01-11 | End: 2024-05-28

## 2024-01-23 ENCOUNTER — OFFICE VISIT (OUTPATIENT)
Dept: CARDIOLOGY | Facility: CLINIC | Age: 64
End: 2024-01-23
Payer: COMMERCIAL

## 2024-01-23 VITALS
WEIGHT: 306 LBS | OXYGEN SATURATION: 93 % | HEART RATE: 68 BPM | SYSTOLIC BLOOD PRESSURE: 132 MMHG | BODY MASS INDEX: 41.45 KG/M2 | DIASTOLIC BLOOD PRESSURE: 77 MMHG | HEIGHT: 72 IN

## 2024-01-23 DIAGNOSIS — R06.02 SHORTNESS OF BREATH: Primary | ICD-10-CM

## 2024-01-23 DIAGNOSIS — I10 HYPERTENSION, UNSPECIFIED TYPE: ICD-10-CM

## 2024-01-23 PROCEDURE — 3078F DIAST BP <80 MM HG: CPT | Performed by: INTERNAL MEDICINE

## 2024-01-23 PROCEDURE — 1036F TOBACCO NON-USER: CPT | Performed by: INTERNAL MEDICINE

## 2024-01-23 PROCEDURE — 3075F SYST BP GE 130 - 139MM HG: CPT | Performed by: INTERNAL MEDICINE

## 2024-01-23 PROCEDURE — 99214 OFFICE O/P EST MOD 30 MIN: CPT | Performed by: INTERNAL MEDICINE

## 2024-01-23 RX ORDER — VALSARTAN 160 MG/1
160 TABLET ORAL DAILY
Qty: 30 TABLET | Refills: 11 | Status: SHIPPED | OUTPATIENT
Start: 2024-01-23 | End: 2025-01-22

## 2024-01-23 ASSESSMENT — PATIENT HEALTH QUESTIONNAIRE - PHQ9
SUM OF ALL RESPONSES TO PHQ9 QUESTIONS 1 AND 2: 0
2. FEELING DOWN, DEPRESSED OR HOPELESS: NOT AT ALL
1. LITTLE INTEREST OR PLEASURE IN DOING THINGS: NOT AT ALL

## 2024-01-23 ASSESSMENT — COLUMBIA-SUICIDE SEVERITY RATING SCALE - C-SSRS
2. HAVE YOU ACTUALLY HAD ANY THOUGHTS OF KILLING YOURSELF?: NO
1. IN THE PAST MONTH, HAVE YOU WISHED YOU WERE DEAD OR WISHED YOU COULD GO TO SLEEP AND NOT WAKE UP?: NO
6. HAVE YOU EVER DONE ANYTHING, STARTED TO DO ANYTHING, OR PREPARED TO DO ANYTHING TO END YOUR LIFE?: NO

## 2024-01-23 ASSESSMENT — ENCOUNTER SYMPTOMS
DEPRESSION: 0
LOSS OF SENSATION IN FEET: 0
OCCASIONAL FEELINGS OF UNSTEADINESS: 0

## 2024-01-23 ASSESSMENT — PAIN SCALES - GENERAL: PAINLEVEL: 0-NO PAIN

## 2024-01-23 NOTE — PATIENT INSTRUCTIONS
Thank you for coming to your cardiology appointment today.  Please continue on your current medications.  In early April please get some blood work drawn I ordered some basic blood chemistries and a blood count.  We will increase Valsartan to 160 mg to improve blood pressure.  I would ideally like to get the blood pressure down in the range of about 130/80 or less.  Your regular doctor and I will work towards this goal over time.  Please avoid any anti-inflammatories such as Naprosyn, Aleve, ibuprofen, Motrin for pain.  These medications could affect your kidney function.  You may use up to 6 extra strength Tylenol's a day.  The higher dose of valsartan will also protect your kidneys.    Please make appointment to see me in 6 to 12 months.

## 2024-01-23 NOTE — PROGRESS NOTES
Primary Care Physician: Diogenes Villalobos MD  Date of Visit: 01/23/2024  1:40 PM EST  [place] [facility] [place] [address]     Chief Complaint:   Chief Complaint   Patient presents with    Follow-up        HPI / Summary:   Patrick Valdovinos is a 63 y.o. male presents for followup.       He has a history of CKD 3, elevated BMI, hypertension, paroxysmal atrial fibrillation, dyslipidemia.  Nondiabetic severe osteoarthritis, heart failure with preserved EF.  He has a history of CKD 3, elevated BMI, hypertension, paroxysmal atrial fibrillation, dyslipidemia.  Nondiabetic severe osteoarthritis, heart failure with preserved EF.  New pcp on [facility].  Specialty Problems          Cardiology Problems    HTN (hypertension)    Hyperlipidemia    Palpitations        No past medical history on file.       No past surgical history on file.       Social History:   reports that he quit smoking about 6 years ago. His smoking use included cigarettes. He has a 3.75 pack-year smoking history. He has never used smokeless tobacco. He reports current alcohol use of about 3.0 standard drinks of alcohol per week. He reports that he does not use drugs.      Allergies:  No Known Allergies    Outpatient Medications:  Current Outpatient Medications   Medication Instructions    apixaban (ELIQUIS) 5 mg, oral, 2 times daily    atorvastatin (LIPITOR) 20 mg, oral, Nightly    diclofenac sodium (Voltaren) 1 % gel gel APPLY 4 GRAMS TO AFFECTED AREA FOUR TIMES DAILY. DO NOT APPLY MORE THAN 16 GRAMS TO ANY ONE JOINT    docusate sodium (COLACE) 100 mg, oral, 2 times daily    Euflexxa 10 mg/mL(mw 2.4 -3.6 million) injection     levothyroxine (SYNTHROID, LEVOXYL) 100 mcg, oral, Daily    metoprolol succinate XL (Toprol-XL) 50 mg 24 hr tablet 1 tablet, oral, Daily    pantoprazole (PROTONIX) 40 mg, oral, Daily    torsemide (DEMADEX) 20 mg, oral, 2 times daily    torsemide (DEMADEX) 40 mg, oral, 2 times daily    valsartan (DIOVAN) 80 mg, oral, Daily  "      ROS     Physical Exam:  Vitals:    01/23/24 1346   BP: 132/77   BP Location: Left arm   Patient Position: Sitting   BP Cuff Size: Large adult   Pulse: 68   SpO2: 93%   Weight: 139 kg (306 lb)   Height: 1.829 m (6')     Wt Readings from Last 5 Encounters:   01/23/24 139 kg (306 lb)   10/23/23 133 kg (294 lb)   04/24/23 136 kg (300 lb 4 oz)   10/14/22 135 kg (298 lb)     Body mass index is 41.5 kg/m².     Well-developed male in no acute distress.  JVP was flat.  Regular rhythm.  Soft abdomen.    Last Labs:  CMP:  Recent Labs     10/23/23  1450 11/01/22  1235    137   K 4.0 3.7   CL 99 98   CO2 24 28   ANIONGAP 21* 15   BUN 25* 18   CREATININE 1.54* 1.70*   EGFR 50*  --    GLUCOSE 110* 107*     Recent Labs     10/23/23  1450 11/01/22  1235   ALBUMIN 4.3 4.5   ALKPHOS 77 84   ALT 12 12   AST 11 17   BILITOT 0.6 0.5     CBC:  Recent Labs     10/23/23  1450 11/01/22  1235   WBC 6.3 5.2   HGB 14.1 15.3   HCT 44.5 47.7    275   MCV 95 93     COAG: No results for input(s): \"INR\", \"DDIMERVTE\" in the last 50133 hours.  HEME/ENDO:  Recent Labs     10/23/23  1450 11/01/22  1235   TSH 2.47 2.94      CARDIAC:   Recent Labs     10/23/23  1450 11/01/22  1235   BNP 22 39     Recent Labs     10/23/23  1450 11/01/22  1235   CHOL 139 199  199   LDLF  --  114*   HDL 48.2 50.0  50.0   TRIG 157* 176*       Last Cardiology Tests:  ECG:      Echo:  Echo Results:  No results found for this or any previous visit from the past 3650 days.       Cath:      Stress Test:  Stress Results:  No results found for this or any previous visit from the past 365 days.         Cardiac Imaging:  ECG 12 lead (Clinic Performed)  Normal sinus rhythm  Normal ECG  When compared with ECG of 14-OCT-2022 15:46,  No significant change was found  Confirmed by Javid Najera ([id number]) on 10/30/2023 2:32:24 PM        Assessment/Plan     He is doing reasonably well from a cardiovascular standpoint for blood pressure control is suboptimal.  Valsartan will " be increased this will hopefully have renal benefits as well as improve blood pressure control.  May eventually refer for sleep study.  Will order blood chemistries, CBC and lipids as well as urine ACR in April      Orders:  No orders of the defined types were placed in this encounter.     Followup Appts:  Future Appointments   Date Time Provider Department Center   2/2/2024  9:00 AM Wesley Palm MD [id number] [place]   3/14/2024  1:00 PM Rahel Pepe DPM [id number] [place]           ____________________________________________________________  Javid Najera MD    Senior Attending Physician  [facility]  [facility]

## 2024-02-02 ENCOUNTER — OFFICE VISIT (OUTPATIENT)
Dept: PRIMARY CARE | Facility: CLINIC | Age: 64
End: 2024-02-02
Payer: COMMERCIAL

## 2024-02-02 VITALS
SYSTOLIC BLOOD PRESSURE: 118 MMHG | WEIGHT: 308.2 LBS | HEART RATE: 69 BPM | DIASTOLIC BLOOD PRESSURE: 62 MMHG | OXYGEN SATURATION: 94 % | BODY MASS INDEX: 41.8 KG/M2

## 2024-02-02 DIAGNOSIS — Z23 NEED FOR SHINGLES VACCINE: ICD-10-CM

## 2024-02-02 DIAGNOSIS — G89.29 CHRONIC PAIN OF BOTH KNEES: ICD-10-CM

## 2024-02-02 DIAGNOSIS — N18.31 STAGE 3A CHRONIC KIDNEY DISEASE (MULTI): ICD-10-CM

## 2024-02-02 DIAGNOSIS — E78.2 MIXED HYPERLIPIDEMIA: ICD-10-CM

## 2024-02-02 DIAGNOSIS — Z23 NEED FOR IMMUNIZATION AGAINST INFLUENZA: ICD-10-CM

## 2024-02-02 DIAGNOSIS — I50.32 CHRONIC HEART FAILURE WITH PRESERVED EJECTION FRACTION (MULTI): Primary | ICD-10-CM

## 2024-02-02 DIAGNOSIS — M25.561 CHRONIC PAIN OF BOTH KNEES: ICD-10-CM

## 2024-02-02 DIAGNOSIS — H53.9 VISION DISORDER: ICD-10-CM

## 2024-02-02 DIAGNOSIS — M25.562 CHRONIC PAIN OF BOTH KNEES: ICD-10-CM

## 2024-02-02 DIAGNOSIS — E03.9 ADULT HYPOTHYROIDISM: ICD-10-CM

## 2024-02-02 DIAGNOSIS — I10 PRIMARY HYPERTENSION: ICD-10-CM

## 2024-02-02 LAB
ANION GAP SERPL CALC-SCNC: 12 MMOL/L (ref 10–20)
BUN SERPL-MCNC: 28 MG/DL (ref 6–23)
CALCIUM SERPL-MCNC: 9.8 MG/DL (ref 8.6–10.3)
CHLORIDE SERPL-SCNC: 99 MMOL/L (ref 98–107)
CHOLEST SERPL-MCNC: 142 MG/DL (ref 0–199)
CHOLESTEROL/HDL RATIO: 3.2
CO2 SERPL-SCNC: 33 MMOL/L (ref 21–32)
CREAT SERPL-MCNC: 1.38 MG/DL (ref 0.5–1.3)
EGFRCR SERPLBLD CKD-EPI 2021: 57 ML/MIN/1.73M*2
ERYTHROCYTE [DISTWIDTH] IN BLOOD BY AUTOMATED COUNT: 13.8 % (ref 11.5–14.5)
GLUCOSE SERPL-MCNC: 106 MG/DL (ref 74–99)
HCT VFR BLD AUTO: 44 % (ref 41–52)
HDLC SERPL-MCNC: 44.1 MG/DL
HGB BLD-MCNC: 14 G/DL (ref 13.5–17.5)
LDLC SERPL CALC-MCNC: 61 MG/DL
MCH RBC QN AUTO: 30.4 PG (ref 26–34)
MCHC RBC AUTO-ENTMCNC: 31.8 G/DL (ref 32–36)
MCV RBC AUTO: 96 FL (ref 80–100)
NON HDL CHOLESTEROL: 98 MG/DL (ref 0–149)
NRBC BLD-RTO: 0 /100 WBCS (ref 0–0)
PLATELET # BLD AUTO: 261 X10*3/UL (ref 150–450)
POTASSIUM SERPL-SCNC: 4 MMOL/L (ref 3.5–5.3)
RBC # BLD AUTO: 4.6 X10*6/UL (ref 4.5–5.9)
SODIUM SERPL-SCNC: 140 MMOL/L (ref 136–145)
TRIGL SERPL-MCNC: 184 MG/DL (ref 0–149)
VLDL: 37 MG/DL (ref 0–40)
WBC # BLD AUTO: 5 X10*3/UL (ref 4.4–11.3)

## 2024-02-02 PROCEDURE — 90750 HZV VACC RECOMBINANT IM: CPT | Performed by: FAMILY MEDICINE

## 2024-02-02 PROCEDURE — 1036F TOBACCO NON-USER: CPT | Performed by: FAMILY MEDICINE

## 2024-02-02 PROCEDURE — 80048 BASIC METABOLIC PNL TOTAL CA: CPT

## 2024-02-02 PROCEDURE — 85027 COMPLETE CBC AUTOMATED: CPT

## 2024-02-02 PROCEDURE — 3074F SYST BP LT 130 MM HG: CPT | Performed by: FAMILY MEDICINE

## 2024-02-02 PROCEDURE — 80061 LIPID PANEL: CPT

## 2024-02-02 PROCEDURE — 99204 OFFICE O/P NEW MOD 45 MIN: CPT | Performed by: FAMILY MEDICINE

## 2024-02-02 PROCEDURE — 90472 IMMUNIZATION ADMIN EACH ADD: CPT | Performed by: FAMILY MEDICINE

## 2024-02-02 PROCEDURE — 3078F DIAST BP <80 MM HG: CPT | Performed by: FAMILY MEDICINE

## 2024-02-02 PROCEDURE — 90686 IIV4 VACC NO PRSV 0.5 ML IM: CPT | Performed by: FAMILY MEDICINE

## 2024-02-02 PROCEDURE — 90471 IMMUNIZATION ADMIN: CPT | Performed by: FAMILY MEDICINE

## 2024-02-02 PROCEDURE — 36415 COLL VENOUS BLD VENIPUNCTURE: CPT

## 2024-02-02 ASSESSMENT — ENCOUNTER SYMPTOMS
ARTHRALGIAS: 1
FATIGUE: 0
CONSTIPATION: 0
DIARRHEA: 0
UNEXPECTED WEIGHT CHANGE: 0
ABDOMINAL PAIN: 0
DIFFICULTY URINATING: 0
BACK PAIN: 1
SHORTNESS OF BREATH: 0
BLOOD IN STOOL: 0
HEADACHES: 0
SLEEP DISTURBANCE: 1

## 2024-02-02 NOTE — PROGRESS NOTES
cbcSubjective   Patient ID: Patrick Valdovinos is a 63 y.o. male who presents for Thyroid Problem.  Thyroid Problem  Patient reports no constipation, diarrhea or fatigue.     Patrick presents to \[facility]\"" care.  He has some chronic medical conditions, but generally feels well.  He is bothered by pretty severe knee arthritis that limits his activity, has had some improvement with gel injections.  Review of Systems   Constitutional:  Negative for fatigue and unexpected weight change.   HENT:  Negative for hearing loss.    Eyes:  Positive for visual disturbance (in mornings).   Respiratory:  Negative for shortness of breath.         Snoring present, girlfriend states no apnea   Cardiovascular:  Negative for chest pain and leg swelling.   Gastrointestinal:  Negative for abdominal pain, blood in stool, constipation and diarrhea.   Genitourinary:  Negative for difficulty urinating.        No problems with erectile function   Musculoskeletal:  Positive for arthralgias (bilateral knee, also bursitis in shoulders) and back pain.   Neurological:  Negative for headaches.   Psychiatric/Behavioral:  Positive for sleep disturbance.        Objective   Vitals:    02/02/24 0855   BP: 147/82   Pulse: 69   SpO2: 94%   Weight: 140 kg (308 lb 3.2 oz)      Physical Exam  Vitals reviewed.   Constitutional:       General: He is not in acute distress.     Appearance: He is obese.   HENT:      Head: Normocephalic and atraumatic.      Right Ear: External ear normal.      Left Ear: External ear normal.      Nose: Nose normal.      Mouth/Throat:      Mouth: Mucous membranes are moist.   Eyes:      Extraocular Movements: Extraocular movements intact.      Pupils: Pupils are equal, round, and reactive to light.   Neck:      Thyroid: No thyroid mass or thyromegaly.   Cardiovascular:      Rate and Rhythm: Normal rate and regular rhythm.      Heart sounds: No murmur heard.     No friction rub. No gallop.   Pulmonary:      Effort: Pulmonary effort is  normal.      Breath sounds: Normal breath sounds.   Abdominal:      General: There is no distension.      Palpations: Abdomen is soft.      Tenderness: There is no abdominal tenderness.   Musculoskeletal:      Cervical back: Neck supple. No tenderness.      Right lower leg: No edema.      Left lower leg: No edema.   Skin:     General: Skin is warm and dry.   Neurological:      General: No focal deficit present.      Mental Status: He is alert and oriented to person, place, and time.   Psychiatric:         Mood and Affect: Mood normal.         Behavior: Behavior normal.         Assessment/Plan   There are no diagnoses linked to this encounter.    Problem List Items Addressed This Visit             ICD-10-CM    Adult hypothyroidism E03.9     Levels euthymic, continue current dose of levothyroxine         HTN (hypertension) I10     Good control, continue current BB, ARB, diuretic         Relevant Orders    Basic metabolic panel    Hyperlipidemia E78.5     Recheck lipid panel, continue statin         Relevant Orders    Lipid panel    Knee pain, bilateral M25.561, M25.562     Is a major issue for patient, but has had good response to gel injections         Stage 3a chronic kidney disease (CMS/[facility]) N18.31     Will recheck BMP given recent change in ARB dose         Relevant Orders    CBC    Basic metabolic panel    Chronic heart failure with preserved ejection fraction (CMS/[facility]) - Primary I50.32     Currently stable.  Is followed by Dr. Najera, cardiology          Other Visit Diagnoses         Codes    Need for immunization against influenza     Z23    Relevant Orders    Flu vaccine (IIV4) age 6 months and greater, preservative free    Need for shingles vaccine     Z23    Relevant Orders    Zoster vaccine, recombinant, adult (SHINGRIX)    Vision disorder     H53.9    Relevant Orders    Referral to Ophthalmology

## 2024-02-05 ENCOUNTER — TELEPHONE (OUTPATIENT)
Dept: CARDIOLOGY | Facility: CLINIC | Age: 64
End: 2024-02-05
Payer: COMMERCIAL

## 2024-02-05 NOTE — TELEPHONE ENCOUNTER
Result Communication    Resulted Orders   CBC   Result Value Ref Range    WBC 5.0 4.4 - 11.3 x10*3/uL    nRBC 0.0 0.0 - 0.0 /100 WBCs    RBC 4.60 4.50 - 5.90 x10*6/uL    Hemoglobin 14.0 13.5 - 17.5 g/dL    Hematocrit 44.0 41.0 - 52.0 %    MCV 96 80 - 100 fL    MCH 30.4 26.0 - 34.0 pg    MCHC 31.8 (L) 32.0 - 36.0 g/dL    RDW 13.8 11.5 - 14.5 %    Platelets 261 150 - 450 x10*3/uL   Lipid panel   Result Value Ref Range    Cholesterol 142 0 - 199 mg/dL      Comment:            Age      Desirable   Borderline High   High     0-19 Y     0 - 169       170 - 199     >/= 200    20-24 Y     0 - 189       190 - 224     >/= 225         >24 Y     0 - 199       200 - 239     >/= 240   **All ranges are based on fasting samples. Specific   therapeutic targets will vary based on patient-specific   cardiac risk.    Pediatric guidelines reference:Pediatrics 2011, 128(S5).Adult guidelines reference: NCEP ATPIII Guidelines,ALEXIS 2001, 258:2486-97    Venipuncture immediately after or during the administration of Metamizole may lead to falsely low results. Testing should be performed immediately prior to Metamizole dosing.    HDL-Cholesterol 44.1 mg/dL      Comment:        Age       Very Low   Low     Normal    High    0-19 Y    < 35      < 40     40-45     ----  20-24 Y    ----     < 40      >45      ----        >24 Y      ----     < 40     40-60      >60      Cholesterol/HDL Ratio 3.2       Comment:        Ref Values  Desirable  < 3.4  High Risk  > 5.0    LDL Calculated 61 <=99 mg/dL      Comment:                                  Near   Borderline      AGE      Desirable  Optimal    High     High     Very High     0-19 Y     0 - 109     ---    110-129   >/= 130     ----    20-24 Y     0 - 119     ---    120-159   >/= 160     ----      >24 Y     0 -  99   100-129  130-159   160-189     >/=190      VLDL 37 0 - 40 mg/dL    Triglycerides 184 (H) 0 - 149 mg/dL      Comment:         Age         Desirable   Borderline High   High     Very  High   0 D-90 D    19 - 174         ----         ----        ----  91 D- 9 Y     0 -  74        75 -  99     >/= 100      ----    10-19 Y     0 -  89        90 - 129     >/= 130      ----    20-24 Y     0 - 114       115 - 149     >/= 150      ----         >24 Y     0 - 149       150 - 199    200- 499    >/= 500    Venipuncture immediately after or during the administration of Metamizole may lead to falsely low results. Testing should be performed immediately prior to Metamizole dosing.    Non HDL Cholesterol 98 0 - 149 mg/dL      Comment:            Age       Desirable   Borderline High   High     Very High     0-19 Y     0 - 119       120 - 144     >/= 145    >/= 160    20-24 Y     0 - 149       150 - 189     >/= 190      ----         >24 Y    30 mg/dL above LDL Cholesterol goal     Basic metabolic panel   Result Value Ref Range    Glucose 106 (H) 74 - 99 mg/dL    Sodium 140 136 - 145 mmol/L    Potassium 4.0 3.5 - 5.3 mmol/L    Chloride 99 98 - 107 mmol/L    Bicarbonate 33 (H) 21 - 32 mmol/L    Anion Gap 12 10 - 20 mmol/L    Urea Nitrogen 28 (H) 6 - 23 mg/dL    Creatinine 1.38 (H) 0.50 - 1.30 mg/dL    eGFR 57 (L) >60 mL/min/1.73m*2      Comment:      Calculations of estimated GFR are performed using the 2021 CKD-EPI Study Refit equation without the race variable for the IDMS-Traceable creatinine methods.  https://jasn.asnjournals.org/content/early/2021/09/22/ASN.2021297057    Calcium 9.8 8.6 - 10.3 mg/dL       4:06 PM      Results were successfully communicated with the patient and they acknowledged their understanding.Message given to pt from Dr Najera that labs are generally ok and there will be no changes to treatment

## 2024-02-05 NOTE — TELEPHONE ENCOUNTER
----- Message from Javid Najera MD sent at 2/5/2024  9:03 AM EST -----  Please let him know labs generally look okay no change in treatment

## 2024-03-14 ENCOUNTER — APPOINTMENT (OUTPATIENT)
Dept: PODIATRY | Facility: CLINIC | Age: 64
End: 2024-03-14
Payer: COMMERCIAL

## 2024-04-02 ASSESSMENT — PROMIS GLOBAL HEALTH SCALE
RATE_QUALITY_OF_LIFE: GOOD
RATE_GENERAL_HEALTH: GOOD
RATE_AVERAGE_PAIN: 6
RATE_PHYSICAL_HEALTH: FAIR
RATE_MENTAL_HEALTH: GOOD
EMOTIONAL_PROBLEMS: RARELY
CARRYOUT_PHYSICAL_ACTIVITIES: A LITTLE
RATE_AVERAGE_FATIGUE: MODERATE
CARRYOUT_SOCIAL_ACTIVITIES: FAIR
RATE_SOCIAL_SATISFACTION: GOOD

## 2024-04-03 ENCOUNTER — OFFICE VISIT (OUTPATIENT)
Dept: PRIMARY CARE | Facility: CLINIC | Age: 64
End: 2024-04-03
Payer: COMMERCIAL

## 2024-04-03 ENCOUNTER — DOCUMENTATION (OUTPATIENT)
Dept: HOME HEALTH SERVICES | Facility: HOME HEALTH | Age: 64
End: 2024-04-03

## 2024-04-03 ENCOUNTER — HOME HEALTH ADMISSION (OUTPATIENT)
Dept: HOME HEALTH SERVICES | Facility: HOME HEALTH | Age: 64
End: 2024-04-03

## 2024-04-03 VITALS
HEART RATE: 85 BPM | DIASTOLIC BLOOD PRESSURE: 80 MMHG | BODY MASS INDEX: 42.45 KG/M2 | WEIGHT: 313 LBS | OXYGEN SATURATION: 97 % | SYSTOLIC BLOOD PRESSURE: 132 MMHG

## 2024-04-03 DIAGNOSIS — Z00.00 WELL ADULT EXAM: Primary | ICD-10-CM

## 2024-04-03 DIAGNOSIS — Z23 NEED FOR SHINGLES VACCINE: ICD-10-CM

## 2024-04-03 DIAGNOSIS — M15.9 PRIMARY OSTEOARTHRITIS INVOLVING MULTIPLE JOINTS: ICD-10-CM

## 2024-04-03 DIAGNOSIS — M19.039 WRIST ARTHRITIS: ICD-10-CM

## 2024-04-03 PROCEDURE — 90750 HZV VACC RECOMBINANT IM: CPT | Performed by: FAMILY MEDICINE

## 2024-04-03 PROCEDURE — 3075F SYST BP GE 130 - 139MM HG: CPT | Performed by: FAMILY MEDICINE

## 2024-04-03 PROCEDURE — 1036F TOBACCO NON-USER: CPT | Performed by: FAMILY MEDICINE

## 2024-04-03 PROCEDURE — 99396 PREV VISIT EST AGE 40-64: CPT | Performed by: FAMILY MEDICINE

## 2024-04-03 PROCEDURE — 3079F DIAST BP 80-89 MM HG: CPT | Performed by: FAMILY MEDICINE

## 2024-04-03 PROCEDURE — 90471 IMMUNIZATION ADMIN: CPT | Performed by: FAMILY MEDICINE

## 2024-04-03 ASSESSMENT — ENCOUNTER SYMPTOMS
ABDOMINAL PAIN: 0
UNEXPECTED WEIGHT CHANGE: 0
NECK PAIN: 1
BACK PAIN: 1
ARTHRALGIAS: 1
SHORTNESS OF BREATH: 0
DIARRHEA: 0
SLEEP DISTURBANCE: 1
DIFFICULTY URINATING: 0
FATIGUE: 0
CONSTIPATION: 0
HEADACHES: 0

## 2024-04-03 NOTE — PROGRESS NOTES
Subjective   Patient ID: Patrick Valdovinos is a 63 y.o. male who presents for No chief complaint on file..  HPI  Patrick presents for wellness visit.  He is bothered by significant arthritic pain; he has difficulty managing things at home, would like home health care aide.  He is also due for second shingles vaccination.  Review of Systems   Constitutional:  Negative for fatigue and unexpected weight change.   Eyes:  Positive for visual disturbance.   Respiratory:  Negative for shortness of breath.    Cardiovascular:  Negative for chest pain.   Gastrointestinal:  Negative for abdominal pain, constipation and diarrhea.   Genitourinary:  Negative for difficulty urinating.   Musculoskeletal:  Positive for arthralgias, back pain and neck pain.   Neurological:  Negative for headaches.   Psychiatric/Behavioral:  Positive for sleep disturbance (secondary to pain).        Objective   Vitals:    04/03/24 1446   BP: 132/80   Pulse: 85   SpO2: 97%   Weight: 142 kg (313 lb)      Physical Exam  Vitals reviewed.   Constitutional:       General: He is not in acute distress.     Appearance: He is obese.   HENT:      Head: Normocephalic and atraumatic.      Right Ear: External ear normal.      Left Ear: External ear normal.      Nose: Nose normal.      Mouth/Throat:      Mouth: Mucous membranes are moist.   Eyes:      Extraocular Movements: Extraocular movements intact.      Pupils: Pupils are equal, round, and reactive to light.   Cardiovascular:      Rate and Rhythm: Normal rate and regular rhythm.      Heart sounds: No murmur heard.     No friction rub. No gallop.   Pulmonary:      Effort: Pulmonary effort is normal.      Breath sounds: Normal breath sounds.   Abdominal:      General: There is no distension.      Palpations: Abdomen is soft.      Tenderness: There is no abdominal tenderness.   Musculoskeletal:      Right hand: Swelling and bony tenderness present.      Cervical back: Neck supple. No tenderness.      Right knee:  Swelling and bony tenderness present.      Left knee: Swelling and bony tenderness present.   Skin:     General: Skin is warm and dry.   Neurological:      General: No focal deficit present.      Mental Status: He is alert and oriented to person, place, and time.   Psychiatric:         Mood and Affect: Mood normal.         Behavior: Behavior normal.         Assessment/Plan   Diagnoses and all orders for this visit:  Well adult exam  Comments:  Patient is up to date on immunizations.  had colonoscopy 4 years ago, will need records  Need for shingles vaccine  -     Zoster vaccine, recombinant, adult (SHINGRIX)  Primary osteoarthritis involving multiple joints  -     Referral to Home Care; Future  Wrist arthritis  -     Wrist splint      Problem List Items Addressed This Visit             ICD-10-CM    Osteoarthritis M19.90     Has significant difficulty with a lot of activities at home due to arthritis.  Will have home health care evaluate patient         Relevant Orders    Referral to Home Care     Other Visit Diagnoses         Codes    Well adult exam    -  Primary Z00.00    Patient is up to date on immunizations.  had colonoscopy 4 years ago, will need records     Need for shingles vaccine     Z23    Relevant Orders    Zoster vaccine, recombinant, adult (SHINGRIX)    Wrist arthritis     M19.039    Relevant Orders    Wrist splint

## 2024-04-03 NOTE — HH CARE COORDINATION
This referral has been made a Non Admit with  Home Care due to No Skilled Disciplines Ordered. If you have further questions, feel free to reach out to our office at [phone]. Thank you, [facility] Intake.

## 2024-04-29 DIAGNOSIS — M25.462 EFFUSION, LEFT KNEE: ICD-10-CM

## 2024-04-29 DIAGNOSIS — M25.461 EFFUSION, RIGHT KNEE: ICD-10-CM

## 2024-04-29 DIAGNOSIS — I10 HYPERTENSION, UNSPECIFIED TYPE: ICD-10-CM

## 2024-04-29 DIAGNOSIS — Z00.00 HEALTH CARE MAINTENANCE: ICD-10-CM

## 2024-04-29 RX ORDER — DICLOFENAC SODIUM 10 MG/G
GEL TOPICAL
Qty: 100 G | Refills: 0 | Status: SHIPPED | OUTPATIENT
Start: 2024-04-29

## 2024-05-01 RX ORDER — DOCUSATE SODIUM 100 MG
100 CAPSULE ORAL 2 TIMES DAILY
Qty: 180 CAPSULE | Refills: 0 | Status: SHIPPED | OUTPATIENT
Start: 2024-05-01

## 2024-05-01 RX ORDER — LEVOTHYROXINE SODIUM 100 UG/1
100 TABLET ORAL DAILY
Qty: 90 TABLET | Refills: 3 | Status: SHIPPED | OUTPATIENT
Start: 2024-05-01

## 2024-05-07 NOTE — ED ADULT NURSE NOTE - GASTROINTESTINAL ASSESSMENT
[Takes medication as prescribed] : takes [None] : Patient does not have any barriers to medication adherence [Yes] : Reviewed medication list for presence of high-risk medications. - - -

## 2024-05-25 DIAGNOSIS — E78.5 HYPERLIPIDEMIA, UNSPECIFIED: ICD-10-CM

## 2024-05-25 DIAGNOSIS — I10 ESSENTIAL (PRIMARY) HYPERTENSION: ICD-10-CM

## 2024-05-28 RX ORDER — TORSEMIDE 20 MG/1
40 TABLET ORAL 2 TIMES DAILY
Qty: 120 TABLET | Refills: 3 | Status: SHIPPED | OUTPATIENT
Start: 2024-05-28

## 2024-05-28 RX ORDER — ATORVASTATIN CALCIUM 20 MG/1
20 TABLET, FILM COATED ORAL NIGHTLY
Qty: 30 TABLET | Refills: 3 | Status: SHIPPED | OUTPATIENT
Start: 2024-05-28

## 2024-06-17 ENCOUNTER — APPOINTMENT (OUTPATIENT)
Dept: OPHTHALMOLOGY | Facility: CLINIC | Age: 64
End: 2024-06-17
Payer: COMMERCIAL

## 2024-06-30 DIAGNOSIS — M25.461 EFFUSION, RIGHT KNEE: ICD-10-CM

## 2024-06-30 DIAGNOSIS — M25.462 EFFUSION, LEFT KNEE: ICD-10-CM

## 2024-07-09 DIAGNOSIS — M25.462 EFFUSION, LEFT KNEE: ICD-10-CM

## 2024-07-09 DIAGNOSIS — M25.461 EFFUSION, RIGHT KNEE: ICD-10-CM

## 2024-07-10 RX ORDER — DICLOFENAC SODIUM 10 MG/G
GEL TOPICAL
Qty: 100 G | Refills: 0 | Status: SHIPPED | OUTPATIENT
Start: 2024-07-10

## 2024-07-18 ENCOUNTER — APPOINTMENT (OUTPATIENT)
Dept: OPHTHALMOLOGY | Facility: CLINIC | Age: 64
End: 2024-07-18
Payer: COMMERCIAL

## 2024-07-23 ENCOUNTER — APPOINTMENT (OUTPATIENT)
Dept: CARDIOLOGY | Facility: CLINIC | Age: 64
End: 2024-07-23
Payer: COMMERCIAL

## 2024-07-25 DIAGNOSIS — I48.91 ATRIAL FIBRILLATION, UNSPECIFIED TYPE (MULTI): ICD-10-CM

## 2024-07-25 DIAGNOSIS — K21.9 GASTROESOPHAGEAL REFLUX DISEASE, UNSPECIFIED WHETHER ESOPHAGITIS PRESENT: ICD-10-CM

## 2024-07-25 DIAGNOSIS — I10 ESSENTIAL (PRIMARY) HYPERTENSION: ICD-10-CM

## 2024-07-25 RX ORDER — METOPROLOL SUCCINATE 50 MG/1
50 TABLET, EXTENDED RELEASE ORAL DAILY
Qty: 90 TABLET | Refills: 3 | Status: SHIPPED | OUTPATIENT
Start: 2024-07-25

## 2024-07-25 RX ORDER — PANTOPRAZOLE SODIUM 40 MG/1
40 TABLET, DELAYED RELEASE ORAL DAILY
Qty: 90 TABLET | Refills: 2 | Status: SHIPPED | OUTPATIENT
Start: 2024-07-25

## 2024-07-25 RX ORDER — APIXABAN 5 MG/1
5 TABLET, FILM COATED ORAL 2 TIMES DAILY
Qty: 180 TABLET | Refills: 2 | Status: SHIPPED | OUTPATIENT
Start: 2024-07-25

## 2024-07-30 DIAGNOSIS — M17.0 ARTHRITIS OF BOTH KNEES: Primary | ICD-10-CM

## 2024-08-02 ENCOUNTER — APPOINTMENT (OUTPATIENT)
Dept: PRIMARY CARE | Facility: CLINIC | Age: 64
End: 2024-08-02
Payer: COMMERCIAL

## 2024-08-02 RX ORDER — DICLOFENAC SODIUM 10 MG/G
GEL TOPICAL
Qty: 100 G | Refills: 0 | Status: SHIPPED | OUTPATIENT
Start: 2024-08-02

## 2024-08-06 ENCOUNTER — HOME HEALTH ADMISSION (OUTPATIENT)
Dept: HOME HEALTH SERVICES | Facility: HOME HEALTH | Age: 64
End: 2024-08-06
Payer: COMMERCIAL

## 2024-08-06 ENCOUNTER — DOCUMENTATION (OUTPATIENT)
Dept: HOME HEALTH SERVICES | Facility: HOME HEALTH | Age: 64
End: 2024-08-06

## 2024-08-06 ENCOUNTER — APPOINTMENT (OUTPATIENT)
Dept: PRIMARY CARE | Facility: CLINIC | Age: 64
End: 2024-08-06
Payer: COMMERCIAL

## 2024-08-06 VITALS
OXYGEN SATURATION: 96 % | DIASTOLIC BLOOD PRESSURE: 76 MMHG | HEART RATE: 65 BPM | WEIGHT: 309.6 LBS | SYSTOLIC BLOOD PRESSURE: 126 MMHG | HEIGHT: 72 IN | TEMPERATURE: 96.8 F | BODY MASS INDEX: 41.93 KG/M2

## 2024-08-06 DIAGNOSIS — I48.0 PAROXYSMAL ATRIAL FIBRILLATION (MULTI): ICD-10-CM

## 2024-08-06 DIAGNOSIS — I10 PRIMARY HYPERTENSION: ICD-10-CM

## 2024-08-06 DIAGNOSIS — E78.2 MIXED HYPERLIPIDEMIA: ICD-10-CM

## 2024-08-06 DIAGNOSIS — Z12.5 PROSTATE CANCER SCREENING: ICD-10-CM

## 2024-08-06 DIAGNOSIS — Z00.00 HEALTHCARE MAINTENANCE: Primary | ICD-10-CM

## 2024-08-06 DIAGNOSIS — M15.9 PRIMARY OSTEOARTHRITIS INVOLVING MULTIPLE JOINTS: ICD-10-CM

## 2024-08-06 DIAGNOSIS — E03.9 ADULT HYPOTHYROIDISM: ICD-10-CM

## 2024-08-06 DIAGNOSIS — I50.32 CHRONIC HEART FAILURE WITH PRESERVED EJECTION FRACTION (MULTI): ICD-10-CM

## 2024-08-06 DIAGNOSIS — Z87.891 FORMER SMOKER: ICD-10-CM

## 2024-08-06 LAB — PSA SERPL-MCNC: 1.23 NG/ML

## 2024-08-06 PROCEDURE — 3078F DIAST BP <80 MM HG: CPT | Performed by: STUDENT IN AN ORGANIZED HEALTH CARE EDUCATION/TRAINING PROGRAM

## 2024-08-06 PROCEDURE — 3074F SYST BP LT 130 MM HG: CPT | Performed by: STUDENT IN AN ORGANIZED HEALTH CARE EDUCATION/TRAINING PROGRAM

## 2024-08-06 PROCEDURE — 99204 OFFICE O/P NEW MOD 45 MIN: CPT | Performed by: STUDENT IN AN ORGANIZED HEALTH CARE EDUCATION/TRAINING PROGRAM

## 2024-08-06 PROCEDURE — 84153 ASSAY OF PSA TOTAL: CPT

## 2024-08-06 PROCEDURE — 3008F BODY MASS INDEX DOCD: CPT | Performed by: STUDENT IN AN ORGANIZED HEALTH CARE EDUCATION/TRAINING PROGRAM

## 2024-08-06 PROCEDURE — 1036F TOBACCO NON-USER: CPT | Performed by: STUDENT IN AN ORGANIZED HEALTH CARE EDUCATION/TRAINING PROGRAM

## 2024-08-06 PROCEDURE — 36415 COLL VENOUS BLD VENIPUNCTURE: CPT

## 2024-08-06 ASSESSMENT — PATIENT HEALTH QUESTIONNAIRE - PHQ9
2. FEELING DOWN, DEPRESSED OR HOPELESS: NOT AT ALL
1. LITTLE INTEREST OR PLEASURE IN DOING THINGS: NOT AT ALL
SUM OF ALL RESPONSES TO PHQ9 QUESTIONS 1 AND 2: 0

## 2024-08-06 ASSESSMENT — PAIN SCALES - GENERAL: PAINLEVEL: 0-NO PAIN

## 2024-08-06 ASSESSMENT — ENCOUNTER SYMPTOMS
DEPRESSION: 0
OCCASIONAL FEELINGS OF UNSTEADINESS: 0
LOSS OF SENSATION IN FEET: 0

## 2024-08-06 NOTE — HH CARE COORDINATION
Home Care received an OP Ortho Referral for Nursing, Physical Therapy, and Occupational Therapy. We have processed the referral for a Start of Care on 24-48 Hrs.     If you have any questions or concerns, please feel free to contact us at [phone]. Follow the prompts, enter your five digit zip code, and you will be directed to your care team on WEST 3.

## 2024-08-06 NOTE — PROGRESS NOTES
Subjective   Patient ID: Patrick Valdovinos is a 63 y.o. male who presents for Referral (Need referral to eye doctor ), New Patient Visit (Establishing care ), and Arthritis.  HPI  Patrick is here to establish care.     Moved from [place] 2.5 years ago. He was previously established with PCP Dr. Palm, who is leaving the area. He reports diffuse OA pains, he has been following with orthopedics for this. Taking Tylenol without significant improvement of his chronic joint pains. He has a difficult time moving around due to his chronic pains and would like to establish with home therapy, he requires significant help around his home and his sister helps him with things.    He is following the Cardiology regularly as well.     PMHx: HFpEF, PAF, DLD, HTN, CKDIII  SurgHx: cholecystectomy  FamHx: leukemia - brother, sister  SocialHx: Former smoker (~2018, smoking 1/2 ppd prior to this ~30 pack year hx). Drinks ~3 glasses of wine per week. No drug use.     Review of Systems  12-point ROS was reviewed and is negative, unless otherwise noted in HPI    Objective   Vitals:    08/06/24 1035   BP: 126/76   Pulse: 65   Temp: 36 °C (96.8 °F)   SpO2: 96%      Physical Exam  GEN: alert, conversant, NAD  HEENT: PERRL, EOMI, MMM, Tms pearly gray bilaterally  NECK: supple, no LAD appreciated  CHEST: CTAB  CV: S1, S2, RRR, no murmurs appreciated  ABD: soft, NT, ND  EXT: no significant LE edema  SKIN: warm, dry    Assessment/Plan   #well adult  - Counseled continued efforts on healthy lifestyle modification including balanced diet, and continued exercise for >5 minutes  - counseled age appropriate vaccines and preventative measures    #HFpEF  #HTN  #DLD  #PAF  Plan:  - Following regularly with Cardiology, Dr. Najera  - Continue Valsartan 160mg daily, Atorvastatin 20mg daily, Torsemide 20mg daily, Metoprolol 50mg daily, Eliquis 5mg BID.    #GERD  - continue pantoprazole 40mg daily    #primary OA, multiple joints  - Following with  Orthopedics  Has had Euflexxa Injections in the past  - referral to home care with PT/OT    #Hypothyroidism  Recent TSH reviewed  - Continue Levothyroxine 100mcg daily    #Former smoker  Quit ~6 years ago. >30 pack year hx  -LDCT ordered today    Health Maintenance:  Vaccines: COVID (UTD), Flu (UTD), TDAP (2019), Shingles (x2), Pneumonia (advised), RSV (advised)  Screening: Colonoscopy (2020 per patient), LDCT (ordered today)  Labs: PSA today     RTC in 6 months, or sooner PRN    Osmel Cornelius, DO

## 2024-08-28 ENCOUNTER — APPOINTMENT (OUTPATIENT)
Dept: PRIMARY CARE | Facility: CLINIC | Age: 64
End: 2024-08-28
Payer: COMMERCIAL

## 2024-09-01 DIAGNOSIS — Z00.00 HEALTH CARE MAINTENANCE: ICD-10-CM

## 2024-09-03 RX ORDER — DOCUSATE SODIUM 100 MG/1
100 CAPSULE, LIQUID FILLED ORAL 2 TIMES DAILY
Qty: 180 CAPSULE | Refills: 1 | Status: SHIPPED | OUTPATIENT
Start: 2024-09-03

## 2024-09-04 DIAGNOSIS — M17.0 ARTHRITIS OF BOTH KNEES: ICD-10-CM

## 2024-09-06 ENCOUNTER — APPOINTMENT (OUTPATIENT)
Dept: CARDIOLOGY | Facility: CLINIC | Age: 64
End: 2024-09-06
Payer: COMMERCIAL

## 2024-09-13 ENCOUNTER — OFFICE VISIT (OUTPATIENT)
Dept: CARDIOLOGY | Facility: CLINIC | Age: 64
End: 2024-09-13
Payer: COMMERCIAL

## 2024-09-13 ENCOUNTER — HOSPITAL ENCOUNTER (OUTPATIENT)
Dept: CARDIOLOGY | Facility: CLINIC | Age: 64
Discharge: HOME | End: 2024-09-13
Payer: COMMERCIAL

## 2024-09-13 VITALS
HEART RATE: 73 BPM | SYSTOLIC BLOOD PRESSURE: 112 MMHG | OXYGEN SATURATION: 97 % | HEIGHT: 72 IN | DIASTOLIC BLOOD PRESSURE: 74 MMHG | WEIGHT: 297 LBS | BODY MASS INDEX: 40.23 KG/M2

## 2024-09-13 DIAGNOSIS — I48.0 PAROXYSMAL ATRIAL FIBRILLATION (MULTI): ICD-10-CM

## 2024-09-13 DIAGNOSIS — I10 PRIMARY HYPERTENSION: Primary | ICD-10-CM

## 2024-09-13 DIAGNOSIS — I10 PRIMARY HYPERTENSION: ICD-10-CM

## 2024-09-13 DIAGNOSIS — R06.02 SHORTNESS OF BREATH: ICD-10-CM

## 2024-09-13 LAB — BODY SURFACE AREA: 2.62 M2

## 2024-09-13 PROCEDURE — 3008F BODY MASS INDEX DOCD: CPT | Performed by: INTERNAL MEDICINE

## 2024-09-13 PROCEDURE — 1036F TOBACCO NON-USER: CPT | Performed by: INTERNAL MEDICINE

## 2024-09-13 PROCEDURE — 99214 OFFICE O/P EST MOD 30 MIN: CPT | Performed by: INTERNAL MEDICINE

## 2024-09-13 PROCEDURE — 93246 EXT ECG>7D<15D RECORDING: CPT

## 2024-09-13 PROCEDURE — 3074F SYST BP LT 130 MM HG: CPT | Performed by: INTERNAL MEDICINE

## 2024-09-13 PROCEDURE — 3078F DIAST BP <80 MM HG: CPT | Performed by: INTERNAL MEDICINE

## 2024-09-13 NOTE — PROGRESS NOTES
Primary Care Physician: Osmel Cornelius DO  Date of Visit: 09/13/2024 11:40 AM EDT  [place] [facility] [place] [address]     Chief Complaint:   No chief complaint on file.       HPI / Summary:   Patrick Valdovinos is a 63 y.o. male presents for followup.       A-fib ablation, nonobstructive CAD cath in 2019, MARCEL on CPAP, osteoarthritis, hypertension moved from the [place] area 2 years ago and has been seeing me after reestablishing care    Creatinine of 1.38 in February, LDL of 61  Felt palpitations a month ago.  Lasted a minute    No cigs, wine with dinner   Feels HR up when getting out of bed.    Specialty Problems          Cardiology Problems    HTN (hypertension)    Hyperlipidemia    Palpitations    Chronic heart failure with preserved ejection fraction (Multi)        Past Medical History:   Diagnosis Date    Arthritis     CHF (congestive heart failure) (Multi)     Disease of thyroid gland     Heart disease     Hypertension           Past Surgical History:   Procedure Laterality Date    ABDOMINAL SURGERY      CARDIAC ELECTROPHYSIOLOGY MAPPING AND ABLATION      CHOLECYSTECTOMY            Social History:   reports that he quit smoking about 6 years ago. His smoking use included cigarettes. He started smoking about 32 years ago. He has a 13 pack-year smoking history. He has never used smokeless tobacco. He reports current alcohol use of about 3.0 standard drinks of alcohol per week. He reports that he does not use drugs.      Allergies:  No Known Allergies    Outpatient Medications:  Current Outpatient Medications   Medication Instructions    atorvastatin (LIPITOR) 20 mg, oral, Nightly    diclofenac sodium (Voltaren) 1 % gel APPLY 4 GRAMS TO AFFECTED AREA FOUR TIMES DAILY. DO NOT APPLY MORE THAN 16 GRAMS TO ANY ONE JOINT    diclofenac sodium (Voltaren) 1 % gel APPLY 4 GRAMS TO AFFECTED AREA FOUR TIMES DAILY. DO NOT APPLY MORE THAN 16 GRAMS TO ANY ONE JOINT    docusate sodium (COLACE) 100 mg, oral, 2 times daily     "Eliquis 5 mg, oral, 2 times daily    levothyroxine (SYNTHROID, LEVOXYL) 100 mcg, oral, Daily    metoprolol succinate XL (TOPROL-XL) 50 mg, oral, Daily    pantoprazole (PROTONIX) 40 mg, oral, Daily    sodium hyaluronate (Euflexxa) 10 mg/mL(mw 2.4 -3.6 million) injection 20 mg, intra-articular, Every 7 days, Inject 1 syringe intra-articularly into bilateral knee weekly for 3 weeks at physicians office.    torsemide (DEMADEX) 40 mg, oral, 2 times daily    valsartan (DIOVAN) 160 mg, oral, Daily       ROS     Physical Exam:  Vitals:    09/13/24 1209   Pulse: 73   SpO2: 97%   Weight: 135 kg (297 lb)   Height: 1.829 m (6')     Wt Readings from Last 5 Encounters:   09/13/24 135 kg (297 lb)   08/06/24 140 kg (309 lb 9.6 oz)   04/03/24 142 kg (313 lb)   02/02/24 140 kg (308 lb 3.2 oz)   01/23/24 139 kg (306 lb)     Body mass index is 40.28 kg/m².     No acute distress.  Barely could move from 1 chair to the next due to a twisted ankle.  Difficult to  neck veins.  Rhythm was rapid and irregular.  Lungs were clear.  Abdomen was soft.  Trace ankle edema with chronic stasis changes distal pulses were not evaluated  Last Labs:  CMP:  Recent Labs     02/02/24  0951 10/23/23  1450 11/01/22  1235    140 137   K 4.0 4.0 3.7   CL 99 99 98   CO2 33* 24 28   ANIONGAP 12 21* 15   BUN 28* 25* 18   CREATININE 1.38* 1.54* 1.70*   EGFR 57* 50*  --    GLUCOSE 106* 110* 107*     Recent Labs     10/23/23  1450 11/01/22  1235   ALBUMIN 4.3 4.5   ALKPHOS 77 84   ALT 12 12   AST 11 17   BILITOT 0.6 0.5     CBC:  Recent Labs     02/02/24  0951 10/23/23  1450 11/01/22  1235   WBC 5.0 6.3 5.2   HGB 14.0 14.1 15.3   HCT 44.0 44.5 47.7    314 275   MCV 96 95 93     COAG: No results for input(s): \"INR\", \"DDIMERVTE\" in the last 72621 hours.  HEME/ENDO:  Recent Labs     10/23/23  1450 11/01/22  1235   TSH 2.47 2.94      CARDIAC:   Recent Labs     10/23/23  1450 11/01/22  1235   BNP 22 39     Recent Labs     02/02/24  0951 10/23/23  1450 " 11/01/22  1235   CHOL 142 139 199  199   LDLF  --   --  114*   HDL 44.1 48.2 50.0  50.0   TRIG 184* 157* 176*       Last Cardiology Tests:  ECG:      Echo:  Echo Results:  No results found for this or any previous visit from the past 3650 days.       Cath:      Stress Test:  Stress Results:  No results found for this or any previous visit from the past 365 days.         Cardiac Imaging:  ECG 12 lead (Clinic Performed)  Normal sinus rhythm  Normal ECG  When compared with ECG of 14-OCT-2022 15:46,  No significant change was found  Confirmed by Javid Najera ([id number]) on 10/30/2023 2:32:24 PM        Assessment/Plan     Elevated BMI, hypertension, dyslipidemia, possible heart failure, A-fib on metoprolol 50 and Eliquis.  Recommend cardiac monitor to assess burden of atrial fibrillation given occasional episodes of high heart rate by patient report.  I will see him back in 6 weeks      Orders:  No orders of the defined types were placed in this encounter.     Followup Appts:  Future Appointments   Date Time Provider Department Center   1/10/2025  2:00 PM Marleen Renee OD [id number]   2/11/2025 10:30 AM Osmel Cornelius DO [id number]           ____________________________________________________________  Javid Najera MD    Senior Attending Physician  [facility]  [facility]

## 2024-09-19 ENCOUNTER — APPOINTMENT (OUTPATIENT)
Dept: ORTHOPEDIC SURGERY | Facility: CLINIC | Age: 64
End: 2024-09-19
Payer: COMMERCIAL

## 2024-09-26 ENCOUNTER — APPOINTMENT (OUTPATIENT)
Dept: ORTHOPEDIC SURGERY | Facility: CLINIC | Age: 64
End: 2024-09-26
Payer: COMMERCIAL

## 2024-09-26 ENCOUNTER — HOSPITAL ENCOUNTER (OUTPATIENT)
Dept: RADIOLOGY | Facility: EXTERNAL LOCATION | Age: 64
Discharge: HOME | End: 2024-09-26

## 2024-09-26 DIAGNOSIS — E78.5 HYPERLIPIDEMIA, UNSPECIFIED: ICD-10-CM

## 2024-09-26 DIAGNOSIS — I10 ESSENTIAL (PRIMARY) HYPERTENSION: ICD-10-CM

## 2024-09-26 DIAGNOSIS — M17.0 PRIMARY OSTEOARTHRITIS OF BOTH KNEES: Primary | ICD-10-CM

## 2024-09-26 PROCEDURE — 1036F TOBACCO NON-USER: CPT | Performed by: FAMILY MEDICINE

## 2024-09-26 PROCEDURE — 20611 DRAIN/INJ JOINT/BURSA W/US: CPT | Performed by: FAMILY MEDICINE

## 2024-09-26 ASSESSMENT — PAIN SCALES - GENERAL: PAINLEVEL_OUTOF10: 5 - MODERATE PAIN

## 2024-09-26 ASSESSMENT — PAIN DESCRIPTION - DESCRIPTORS: DESCRIPTORS: SHARP

## 2024-09-26 ASSESSMENT — PAIN - FUNCTIONAL ASSESSMENT: PAIN_FUNCTIONAL_ASSESSMENT: 0-10

## 2024-09-26 NOTE — PROGRESS NOTES
FU Euflexxa B/L knee inject    Patrick returns for his first injection of viscosupplementation with Euflexxa to both knees.  This is his second round of injections with Euflexxa.    Objective:  Musculoskeletal-bilateral knees have no significant change from previous visits.  He has mild bilateral effusions, but no warmth, redness, or skin changes.  Good range of motion.  Minimal crepitus with movement.  His ligamentous exam is stable bilaterally.  His last injections were about 6 months ago and he got good relief up until recently.  He currently rates his pain as a 5 out of 10 at its worst.  He denies any new injury or trauma to the knees.  We reviewed the risks, benefits, and alternatives and he is comfortable moving forward.    L Inj/Asp: bilateral knee on 9/26/2024 2:25 PM  Indications: pain  Details: 22 G needle, ultrasound-guided superolateral approach  Medications (Right): 20 mg sodium hyaluronate 10 mg/mL(mw 2.4 -3.6 million)  Medications (Left): 20 mg sodium hyaluronate 10 mg/mL(mw 2.4 -3.6 million)    Ultrasound-guided bilateral knee viscosupplementation injection with Euflexxa.  Injection number 1 in a series of 3.  Risks, benefits, and alternatives were explained to the patient and verbal and written consent was obtained.  The patient was placed in supine position with a bump under the knees for comfort.  Ultrasound images were obtained throughout the procedure and stored for review at a later time if needed.  Procedure, treatment alternatives, risks and benefits explained, specific risks discussed. Consent was given by the patient. Immediately prior to procedure a time out was called to verify the correct patient, procedure, equipment, support staff and site/side marked as required. Patient was prepped and draped in the usual sterile fashion.       Assessment and Plan:    Status post ultrasound-guided Visco supplementation injection with Euflexxa, # 1 in a series of 3.  he was educated on the signs and  symptoms of local reaction and infection and should call the office if he experiences any of these. he should take it easy on the hip for the next 24 to 48 hours, rest, ice, and anti-inflammatories. After that, he can return to his normal activity.  he understands that this is not a cure, but an attempt to buy some time, decrease his discomfort, and slow the arthritic process. he may ultimately require surgery, but we will exhaust all of our conservative treatment options prior to that.  At this point he has completed the series of injections.  His knees bother him again.  He should continue with his home exercise program and current medication regimen.  All of his questions were answered and he agrees with the treatment plan.    ** Please excuse any errors in grammar or translation related to this dictation. Voice recognition software was utilized to prepare this document. **    Tobias Lovell M.D.  Clinical , Division of Sports Medicine  Primary Care Sports Medicine  Department of Orthopedic Surgery  [facility]

## 2024-09-27 RX ORDER — TORSEMIDE 20 MG/1
40 TABLET ORAL 2 TIMES DAILY
Qty: 120 TABLET | Refills: 3 | Status: SHIPPED | OUTPATIENT
Start: 2024-09-27

## 2024-09-27 RX ORDER — ATORVASTATIN CALCIUM 20 MG/1
20 TABLET, FILM COATED ORAL NIGHTLY
Qty: 30 TABLET | Refills: 3 | Status: SHIPPED | OUTPATIENT
Start: 2024-09-27

## 2024-10-02 DIAGNOSIS — M25.462 EFFUSION, LEFT KNEE: ICD-10-CM

## 2024-10-02 DIAGNOSIS — M25.461 EFFUSION, RIGHT KNEE: ICD-10-CM

## 2024-10-02 DIAGNOSIS — Z00.00 HEALTH CARE MAINTENANCE: ICD-10-CM

## 2024-10-02 RX ORDER — DICLOFENAC SODIUM 10 MG/G
GEL TOPICAL
Qty: 100 G | Refills: 0 | Status: SHIPPED | OUTPATIENT
Start: 2024-10-02

## 2024-10-02 RX ORDER — DICLOFENAC SODIUM 10 MG/G
4 GEL TOPICAL 2 TIMES DAILY PRN
Qty: 100 G | Refills: 0 | Status: SHIPPED | OUTPATIENT
Start: 2024-10-02

## 2024-10-03 ENCOUNTER — APPOINTMENT (OUTPATIENT)
Dept: ORTHOPEDIC SURGERY | Facility: CLINIC | Age: 64
End: 2024-10-03
Payer: COMMERCIAL

## 2024-10-03 ENCOUNTER — HOSPITAL ENCOUNTER (OUTPATIENT)
Dept: RADIOLOGY | Facility: EXTERNAL LOCATION | Age: 64
Discharge: HOME | End: 2024-10-03

## 2024-10-03 DIAGNOSIS — M17.0 PRIMARY OSTEOARTHRITIS OF BOTH KNEES: ICD-10-CM

## 2024-10-03 PROCEDURE — 1036F TOBACCO NON-USER: CPT | Performed by: FAMILY MEDICINE

## 2024-10-03 PROCEDURE — 20611 DRAIN/INJ JOINT/BURSA W/US: CPT | Performed by: STUDENT IN AN ORGANIZED HEALTH CARE EDUCATION/TRAINING PROGRAM

## 2024-10-03 ASSESSMENT — PAIN SCALES - GENERAL: PAINLEVEL_OUTOF10: 3

## 2024-10-03 ASSESSMENT — PAIN - FUNCTIONAL ASSESSMENT: PAIN_FUNCTIONAL_ASSESSMENT: 0-10

## 2024-10-03 ASSESSMENT — PAIN DESCRIPTION - DESCRIPTORS: DESCRIPTORS: ACHING

## 2024-10-03 NOTE — PROGRESS NOTES
FUV Euflexxa B/L knee inject Pt Provided    9/26/2024 Patrick returns for his first injection of viscosupplementation with Euflexxa to both knees.  This is his second round of injections with Euflexxa.    10/3/2024: Patrick returns for follow up bilateral knee pain.  He reports feeling much better after first injection of Euflexxa.  He has not needed any pain medication since last visit.  Denies interval trauma, fall, injury.  He would like to proceed with 2nd shot of Euflexxa in a series of 3. We reviewed the risks, benefits, and alternatives and he is comfortable moving forward.    Objective:  Musculoskeletal-bilateral knees have no significant change from previous visits.  He has mild bilateral effusions, but no warmth, redness, or skin changes.  Good range of motion.  Minimal crepitus with movement.  His ligamentous exam is stable bilaterally.      L Inj/Asp: bilateral knee on 10/3/2024 3:00 PM  Indications: pain  Details: 22 G needle, ultrasound-guided superolateral approach  Medications (Right): 20 mg sodium hyaluronate 10 mg/mL(mw 2.4 -3.6 million)  Medications (Left): 20 mg sodium hyaluronate 10 mg/mL(mw 2.4 -3.6 million)  Outcome: tolerated well, no immediate complications    Ultrasound-guided bilateral knee viscosupplementation injection with Euflexxa.  Injection number 2 in a series of 3.  Risks, benefits, and alternatives were explained to the patient and verbal and written consent was obtained.  The patient was placed in supine position with a bump under the knees for comfort.   Ultrasound images were obtained throughout the procedure and stored for review at a later time if needed.      Procedure, treatment alternatives, risks and benefits explained, specific risks discussed. Immediately prior to procedure a time out was called to verify the correct patient, procedure, equipment, support staff and site/side marked as required. Patient was prepped and draped in the usual sterile fashion.       Assessment  and Plan:    Status post ultrasound-guided Visco supplementation injection with Euflexxa, # 2 in a series of 3.  he was educated on the signs and symptoms of local reaction and infection and should call the office if he experiences any of these. he should take it easy on the hip for the next 24 to 48 hours, rest, ice, and anti-inflammatories. After that, he can return to his normal activity.  he understands that this is not a cure, but an attempt to buy some time, decrease his discomfort, and slow the arthritic process. he may ultimately require surgery, but we will exhaust all of our conservative treatment options prior to that.  At this point he has completed the series of injections.  His knees bother him again.  He should continue with his home exercise program and current medication regimen.  All of his questions were answered and he agrees with the treatment plan.    ** Please excuse any errors in grammar or translation related to this dictation. Voice recognition software was utilized to prepare this document. **    Noble Arce MD  Primary Care Sports Medicine Fellow  [facility]  [facility]

## 2024-10-07 ENCOUNTER — TELEPHONE (OUTPATIENT)
Dept: CARDIOLOGY | Facility: CLINIC | Age: 64
End: 2024-10-07
Payer: COMMERCIAL

## 2024-10-07 LAB — BODY SURFACE AREA: 2.62 M2

## 2024-10-10 ENCOUNTER — APPOINTMENT (OUTPATIENT)
Dept: ORTHOPEDIC SURGERY | Facility: CLINIC | Age: 64
End: 2024-10-10
Payer: COMMERCIAL

## 2024-10-10 ENCOUNTER — HOSPITAL ENCOUNTER (OUTPATIENT)
Dept: RADIOLOGY | Facility: EXTERNAL LOCATION | Age: 64
Discharge: HOME | End: 2024-10-10

## 2024-10-10 DIAGNOSIS — M17.0 PRIMARY OSTEOARTHRITIS OF BOTH KNEES: Primary | ICD-10-CM

## 2024-10-10 ASSESSMENT — PAIN - FUNCTIONAL ASSESSMENT: PAIN_FUNCTIONAL_ASSESSMENT: 0-10

## 2024-10-10 ASSESSMENT — PAIN SCALES - GENERAL: PAINLEVEL_OUTOF10: 4

## 2024-10-10 ASSESSMENT — PAIN DESCRIPTION - DESCRIPTORS: DESCRIPTORS: ACHING

## 2024-10-10 NOTE — PROGRESS NOTES
#3 Euflexxa inj pt prov B/L knee    9/26/2024 Patrick returns for his first injection of viscosupplementation with Euflexxa to both knees.  This is his second round of injections with Euflexxa.    10/3/2024: Patrick returns for follow up bilateral knee pain.  He reports feeling much better after first injection of Euflexxa.  He has not needed any pain medication since last visit.  Denies interval trauma, fall, injury.  He would like to proceed with 2nd shot of Euflexxa in a series of 3. We reviewed the risks, benefits, and alternatives and he is comfortable moving forward.    10/10/2024: Patrick returns for his third and final Euflexxa injections to both knees.  He denies any new injury or trauma to the knees.  He states that he is much better.  He is comfortable moving forward with the last injection in the series.    Objective:  Musculoskeletal-bilateral knees have no significant change from previous visits.  He has mild bilateral effusions, but no warmth, redness, or skin changes.  Good range of motion.  Minimal crepitus with movement.  His ligamentous exam is stable bilaterally.      L Inj/Asp: bilateral knee on 10/10/2024 1:42 PM  Indications: pain  Details: 22 G needle, ultrasound-guided superolateral approach  Medications (Right): 20 mg sodium hyaluronate 10 mg/mL(mw 2.4 -3.6 million)  Medications (Left): 20 mg sodium hyaluronate 10 mg/mL(mw 2.4 -3.6 million)  Outcome: tolerated well, no immediate complications    Ultrasound-guided bilateral knee viscosupplementation injection with Euflexxa.  Injection number 3 in a series of 3.  Risks, benefits, and alternatives were explained to the patient and verbal and written consent was obtained.  The patient was placed in supine position with a bump under the knees for comfort.   Ultrasound images were obtained throughout the procedure and stored for review at a later time if needed.      Procedure, treatment alternatives, risks and benefits explained, specific risks  discussed. Immediately prior to procedure a time out was called to verify the correct patient, procedure, equipment, support staff and site/side marked as required. Patient was prepped and draped in the usual sterile fashion.         Assessment and Plan:    Status post ultrasound-guided Visco supplementation injection with Euflexxa, # 3 in a series of 3.  he was educated on the signs and symptoms of local reaction and infection and should call the office if he experiences any of these. he should take it easy on the hip for the next 24 to 48 hours, rest, ice, and anti-inflammatories. After that, he can return to his normal activity.  he understands that this is not a cure, but an attempt to buy some time, decrease his discomfort, and slow the arthritic process. he may ultimately require surgery, but we will exhaust all of our conservative treatment options prior to that.  At this point he has completed the series of injections.  His knees bother him again.  He should continue with his home exercise program and current medication regimen.  At this point he has completed the series.  He did have some fluid on the right knee and if it remains, may need to be drained.  However, it seems to be doing better.  He can follow-up as needed.  Hopefully he gets 6 months worth of relief and we can consider repeating the injections at that time.  All of his questions were answered and he agrees with the treatment plan.    ** Please excuse any errors in grammar or translation related to this dictation. Voice recognition software was utilized to prepare this document. **    Tobias Lovell M.D.  Clinical , Division of Sports Medicine  Primary Care Sports Medicine  Department of Orthopedic Surgery  [facility]

## 2024-10-14 ENCOUNTER — TELEPHONE (OUTPATIENT)
Dept: CARDIOLOGY | Facility: HOSPITAL | Age: 64
End: 2024-10-14
Payer: COMMERCIAL

## 2024-10-22 ENCOUNTER — APPOINTMENT (OUTPATIENT)
Dept: PRIMARY CARE | Facility: CLINIC | Age: 64
End: 2024-10-22
Payer: COMMERCIAL

## 2024-10-22 VITALS
TEMPERATURE: 97.1 F | OXYGEN SATURATION: 97 % | WEIGHT: 300.8 LBS | SYSTOLIC BLOOD PRESSURE: 118 MMHG | DIASTOLIC BLOOD PRESSURE: 70 MMHG | BODY MASS INDEX: 40.8 KG/M2 | HEART RATE: 64 BPM

## 2024-10-22 DIAGNOSIS — M25.462 EFFUSION, LEFT KNEE: ICD-10-CM

## 2024-10-22 DIAGNOSIS — H04.123 DRY EYES: ICD-10-CM

## 2024-10-22 DIAGNOSIS — F40.240 CLAUSTROPHOBIA: Primary | ICD-10-CM

## 2024-10-22 DIAGNOSIS — E66.01 OBESITY, CLASS III, BMI 40-49.9 (MORBID OBESITY) (MULTI): ICD-10-CM

## 2024-10-22 DIAGNOSIS — M25.461 EFFUSION, RIGHT KNEE: ICD-10-CM

## 2024-10-22 LAB
EST. AVERAGE GLUCOSE BLD GHB EST-MCNC: 126 MG/DL
HBA1C MFR BLD: 6 %
TSH SERPL-ACNC: 2.69 MIU/L (ref 0.44–3.98)

## 2024-10-22 PROCEDURE — 90471 IMMUNIZATION ADMIN: CPT | Performed by: STUDENT IN AN ORGANIZED HEALTH CARE EDUCATION/TRAINING PROGRAM

## 2024-10-22 PROCEDURE — 3074F SYST BP LT 130 MM HG: CPT | Performed by: STUDENT IN AN ORGANIZED HEALTH CARE EDUCATION/TRAINING PROGRAM

## 2024-10-22 PROCEDURE — 1036F TOBACCO NON-USER: CPT | Performed by: STUDENT IN AN ORGANIZED HEALTH CARE EDUCATION/TRAINING PROGRAM

## 2024-10-22 PROCEDURE — 3078F DIAST BP <80 MM HG: CPT | Performed by: STUDENT IN AN ORGANIZED HEALTH CARE EDUCATION/TRAINING PROGRAM

## 2024-10-22 PROCEDURE — 90656 IIV3 VACC NO PRSV 0.5 ML IM: CPT | Performed by: STUDENT IN AN ORGANIZED HEALTH CARE EDUCATION/TRAINING PROGRAM

## 2024-10-22 PROCEDURE — 83036 HEMOGLOBIN GLYCOSYLATED A1C: CPT

## 2024-10-22 PROCEDURE — 84443 ASSAY THYROID STIM HORMONE: CPT

## 2024-10-22 PROCEDURE — 99214 OFFICE O/P EST MOD 30 MIN: CPT | Performed by: STUDENT IN AN ORGANIZED HEALTH CARE EDUCATION/TRAINING PROGRAM

## 2024-10-22 RX ORDER — DICLOFENAC SODIUM 10 MG/G
4 GEL TOPICAL 2 TIMES DAILY PRN
Qty: 100 G | Refills: 1 | Status: SHIPPED | OUTPATIENT
Start: 2024-10-22

## 2024-10-22 RX ORDER — LORAZEPAM 0.5 MG/1
0.5 TABLET ORAL ONCE
Qty: 1 TABLET | Refills: 0 | Status: SHIPPED | OUTPATIENT
Start: 2024-10-22 | End: 2024-10-22

## 2024-10-22 ASSESSMENT — PAIN SCALES - GENERAL: PAINLEVEL_OUTOF10: 8

## 2024-10-22 ASSESSMENT — PATIENT HEALTH QUESTIONNAIRE - PHQ9
SUM OF ALL RESPONSES TO PHQ9 QUESTIONS 1 AND 2: 0
1. LITTLE INTEREST OR PLEASURE IN DOING THINGS: NOT AT ALL
2. FEELING DOWN, DEPRESSED OR HOPELESS: NOT AT ALL

## 2024-10-22 ASSESSMENT — ENCOUNTER SYMPTOMS: DEPRESSION: 0

## 2024-10-22 NOTE — PROGRESS NOTES
Subjective   Patient ID: Patrick Valdovinos is a 64 y.o. male who presents for Immunizations and Shoulder Pain.  Patrick is here for for follow up visit and flu vaccine.    He reports worsening left shoulder pain, has upcoming appointment with orthopedics to address acute on chronic shoulder pain and assess possible cortisone injection candidacy.     He is taking all of his medications as prescribed. No other complaint at this time.    Review of Systems  12-point ROS was reviewed and is negative, unless otherwise noted in HPI    Objective   Vitals:    10/22/24 1418   BP: 118/70   Pulse: 64   Temp: 36.2 °C (97.1 °F)   SpO2: 97%      Physical Exam  GEN: alert, conversant, NAD  HEENT: PERRL, EOMI, MMM, Tms pearly gray bilaterally  NECK: supple, no LAD appreciated  CHEST: CTAB  CV: S1, S2, RRR, no murmurs appreciated  ABD: soft, NT, ND  EXT: no significant LE edema  SKIN: warm, dry    Assessment/Plan   #HFpEF  #HTN  #DLD  #PAF  Plan:  - Following regularly with Cardiology, Dr. Najera  - Continue Valsartan 160mg daily, Atorvastatin 20mg daily, Torsemide 20mg daily, Metoprolol 50mg daily, Eliquis 5mg BID.    #GERD  - continue pantoprazole 40mg daily    #primary OA, multiple joints  - Following with Orthopedics  Has had Euflexxa Injections in the past    #Hypothyroidism  Recent TSH reviewed  - Continue Levothyroxine 100mcg daily    #Former smoker  Quit ~6 years ago. >30 pack year hx  - LDCT ordered at last visit. Patient with claustrophobia. Ordered 1 dose of ativan to be taken 30 minutes prior to LDCT.    Health Maintenance:  Vaccines: COVID (UTD), Flu (update today), TDAP (2019), Shingles (x2), Pneumonia (advised), RSV (advised)  Screening: Colonoscopy (2020 per patient), LDCT (ordered at last visit)  Labs: obtain today     RTC as scheduled, or sooner PRN    Osmel Cornelius DO

## 2024-10-28 ENCOUNTER — APPOINTMENT (OUTPATIENT)
Dept: CARDIOLOGY | Facility: CLINIC | Age: 64
End: 2024-10-28
Payer: COMMERCIAL

## 2024-11-30 DIAGNOSIS — I10 HYPERTENSION, UNSPECIFIED TYPE: ICD-10-CM

## 2024-12-02 RX ORDER — LEVOTHYROXINE SODIUM 100 UG/1
100 TABLET ORAL DAILY
Qty: 90 TABLET | Refills: 3 | Status: SHIPPED | OUTPATIENT
Start: 2024-12-02

## 2024-12-31 DIAGNOSIS — M25.462 EFFUSION, LEFT KNEE: ICD-10-CM

## 2024-12-31 DIAGNOSIS — M25.461 EFFUSION, RIGHT KNEE: ICD-10-CM

## 2025-01-02 RX ORDER — DICLOFENAC SODIUM 10 MG/G
4 GEL TOPICAL 2 TIMES DAILY PRN
Qty: 100 G | Refills: 1 | Status: SHIPPED | OUTPATIENT
Start: 2025-01-02

## 2025-01-10 ENCOUNTER — APPOINTMENT (OUTPATIENT)
Dept: OPHTHALMOLOGY | Facility: CLINIC | Age: 65
End: 2025-01-10
Payer: COMMERCIAL

## 2025-01-10 DIAGNOSIS — H52.03 HYPERMETROPIA OF BOTH EYES: Primary | ICD-10-CM

## 2025-01-10 DIAGNOSIS — H52.4 PRESBYOPIA: ICD-10-CM

## 2025-01-10 DIAGNOSIS — H52.223 REGULAR ASTIGMATISM OF BOTH EYES: ICD-10-CM

## 2025-01-10 DIAGNOSIS — H25.13 NUCLEAR SCLEROTIC CATARACT OF BOTH EYES: ICD-10-CM

## 2025-01-10 DIAGNOSIS — H53.001 AMBLYOPIA, RIGHT EYE: ICD-10-CM

## 2025-01-10 DIAGNOSIS — H02.59 FLOPPY EYELID SYNDROME: ICD-10-CM

## 2025-01-10 PROCEDURE — 92015 DETERMINE REFRACTIVE STATE: CPT | Performed by: OPTOMETRIST

## 2025-01-10 PROCEDURE — 92004 COMPRE OPH EXAM NEW PT 1/>: CPT | Performed by: OPTOMETRIST

## 2025-01-10 ASSESSMENT — REFRACTION_MANIFEST
OS_AXIS: 075
OD_CYLINDER: -0.50
OS_ADD: +2.50
OD_ADD: +2.50
OS_CYLINDER: -0.75
OS_SPHERE: +1.25
OS_SPHERE: +1.25
OS_CYLINDER: -0.75
OD_AXIS: 068
METHOD_AUTOREFRACTION: 1
OD_CYLINDER: -0.50
OD_AXIS: 070
OD_SPHERE: +2.00
OS_AXIS: 075
OD_SPHERE: +2.00

## 2025-01-10 ASSESSMENT — TONOMETRY
IOP_METHOD: GOLDMANN APPLANATION
OD_IOP_MMHG: 15
OS_IOP_MMHG: 16

## 2025-01-10 ASSESSMENT — ENCOUNTER SYMPTOMS
CONSTITUTIONAL NEGATIVE: 0
GASTROINTESTINAL NEGATIVE: 0
MUSCULOSKELETAL NEGATIVE: 0
NEUROLOGICAL NEGATIVE: 0
HEMATOLOGIC/LYMPHATIC NEGATIVE: 0
PSYCHIATRIC NEGATIVE: 0
RESPIRATORY NEGATIVE: 0
EYES NEGATIVE: 1
CARDIOVASCULAR NEGATIVE: 0
ENDOCRINE NEGATIVE: 0
ALLERGIC/IMMUNOLOGIC NEGATIVE: 0

## 2025-01-10 ASSESSMENT — CONF VISUAL FIELD
METHOD: COUNTING FINGERS
OS_INFERIOR_TEMPORAL_RESTRICTION: 0
OD_INFERIOR_TEMPORAL_RESTRICTION: 0
OD_NORMAL: 1
OS_NORMAL: 1
OD_INFERIOR_NASAL_RESTRICTION: 0
OS_INFERIOR_NASAL_RESTRICTION: 0
OD_SUPERIOR_NASAL_RESTRICTION: 0
OS_SUPERIOR_TEMPORAL_RESTRICTION: 0
OS_SUPERIOR_NASAL_RESTRICTION: 0
OD_SUPERIOR_TEMPORAL_RESTRICTION: 0

## 2025-01-10 ASSESSMENT — REFRACTION
OD_AXIS: 070
OS_ADD: +2.50
OD_ADD: +2.50
OD_SPHERE: +1.75
OS_SPHERE: +1.00
OS_AXIS: 075
OS_CYLINDER: -0.75
OD_CYLINDER: -0.50

## 2025-01-10 ASSESSMENT — CUP TO DISC RATIO
OS_RATIO: 0.5
OD_RATIO: 0.5

## 2025-01-10 ASSESSMENT — VISUAL ACUITY
METHOD: SNELLEN - LINEAR
OS_SC: 20/30+1
OD_SC: 20/60

## 2025-01-10 ASSESSMENT — EXTERNAL EXAM - RIGHT EYE: OD_EXAM: NORMAL

## 2025-01-10 ASSESSMENT — EXTERNAL EXAM - LEFT EYE: OS_EXAM: NORMAL

## 2025-01-10 NOTE — PROGRESS NOTES
Assessment/Plan   Diagnoses and all orders for this visit:  Hypermetropia of both eyes  Regular astigmatism of both eyes  Presbyopia  New spec rx released today per patient request. Ocular health wnl for age OU. Monitor 1 year or sooner prn. Refraction billed today. Pt consents to receiving glasses Rx today. Patient's/guardian's signature obtained to acknowledge and confirm that a paper copy of glasses Rx was given to patient in compliance with [facility] Eyeglass Rule. Electronic copy of Rx will also be available via [employer]/EPIC.     Nuclear sclerotic cataract of both eyes  Patient's cataracts are not visually significant. Will monitor for changes. No indication for surgery at this time.    Floppy eyelid syndrome  Recommend PF lubricant juan qhs OU, WC qday ou x 8-10 minutes and ATs bid-tid OU. Monitor.   +Hx of MARCEL.    Amblyopia, right eye  Stable. Per patient. Monitor.

## 2025-01-30 DIAGNOSIS — E78.5 HYPERLIPIDEMIA, UNSPECIFIED: ICD-10-CM

## 2025-01-30 DIAGNOSIS — I10 ESSENTIAL (PRIMARY) HYPERTENSION: ICD-10-CM

## 2025-01-30 DIAGNOSIS — R06.02 SHORTNESS OF BREATH: ICD-10-CM

## 2025-01-30 DIAGNOSIS — I10 HYPERTENSION, UNSPECIFIED TYPE: ICD-10-CM

## 2025-01-30 RX ORDER — TORSEMIDE 20 MG/1
40 TABLET ORAL 2 TIMES DAILY
Qty: 120 TABLET | Refills: 3 | Status: SHIPPED | OUTPATIENT
Start: 2025-01-30

## 2025-01-30 RX ORDER — ATORVASTATIN CALCIUM 20 MG/1
20 TABLET, FILM COATED ORAL NIGHTLY
Qty: 30 TABLET | Refills: 3 | Status: SHIPPED | OUTPATIENT
Start: 2025-01-30

## 2025-01-30 RX ORDER — VALSARTAN 160 MG/1
160 TABLET ORAL DAILY
Qty: 30 TABLET | Refills: 11 | Status: SHIPPED | OUTPATIENT
Start: 2025-01-30

## 2025-02-11 ENCOUNTER — APPOINTMENT (OUTPATIENT)
Dept: PRIMARY CARE | Facility: CLINIC | Age: 65
End: 2025-02-11
Payer: COMMERCIAL

## 2025-02-11 ENCOUNTER — APPOINTMENT (OUTPATIENT)
Dept: CARDIOLOGY | Facility: CLINIC | Age: 65
End: 2025-02-11
Payer: COMMERCIAL

## 2025-02-12 ENCOUNTER — APPOINTMENT (OUTPATIENT)
Facility: CLINIC | Age: 65
End: 2025-02-12
Payer: COMMERCIAL

## 2025-02-12 VITALS
HEART RATE: 76 BPM | HEIGHT: 72 IN | WEIGHT: 309.6 LBS | BODY MASS INDEX: 41.93 KG/M2 | SYSTOLIC BLOOD PRESSURE: 128 MMHG | DIASTOLIC BLOOD PRESSURE: 66 MMHG

## 2025-02-12 DIAGNOSIS — I48.0 PAROXYSMAL ATRIAL FIBRILLATION (MULTI): ICD-10-CM

## 2025-02-12 DIAGNOSIS — I50.32 CHRONIC DIASTOLIC HEART FAILURE: Primary | ICD-10-CM

## 2025-02-12 DIAGNOSIS — I10 PRIMARY HYPERTENSION: ICD-10-CM

## 2025-02-12 PROCEDURE — 3078F DIAST BP <80 MM HG: CPT | Performed by: INTERNAL MEDICINE

## 2025-02-12 PROCEDURE — 3008F BODY MASS INDEX DOCD: CPT | Performed by: INTERNAL MEDICINE

## 2025-02-12 PROCEDURE — 93000 ELECTROCARDIOGRAM COMPLETE: CPT | Performed by: INTERNAL MEDICINE

## 2025-02-12 PROCEDURE — 3074F SYST BP LT 130 MM HG: CPT | Performed by: INTERNAL MEDICINE

## 2025-02-12 PROCEDURE — 99205 OFFICE O/P NEW HI 60 MIN: CPT | Performed by: INTERNAL MEDICINE

## 2025-02-12 NOTE — PROGRESS NOTES
[facility]     Cardiology Office Follow-up: New Patient Visit    Patient previously seen for afib s/p ablation, non-obstructive CAD, MARCEL, HTN, HPL.    At the previous encounter, the patient was seen [place] [facility] [place] follow[place].    After the encounter the patient completed the following testing: holter which was unremarkable    There were no interval changes in medical history before this appointment.    Today the patient reports: back pain, knee arthritis.  BP has been running high 170/  Was running high, but improved after pain got better.    Additional recent non-cardiac testing includes: none    ROS: Remainder of 12 review of systems is negative aside from chief complaint.    PHYSICAL EXAM  Vitals:    02/12/25 1023   BP: 128/66   BP Location: Right arm   Patient Position: Sitting   Pulse: 76   Weight: 140 kg (309 lb 9.6 oz)   Height: 1.829 m (6')     General: No acute distress, appears comfortable  Cardiac: Regular rate and rhythm with no murmurs rubs or gallops, normal S1-S2  Pulmonary: Clear to auscultation bilaterally with no rales or rhonchi, normal respiratory effort  Gastrointestinal: Soft abdomen with no tenderness or guarding, no rebound  Musculoskeletal: No lower extremity edema, normal  Neurological: Alert and oriented x 4, appropriate, moving all extremities well, normal affect  Psychiatric: Normal affect, mood appropriate to occasion  Skin: No rashes, hives or jaundice  HEENT: Normocephalic, atraumatic.  Pupils equal round and reactive.    Assessment/Plan   Diagnoses and all orders for this visit:  Chronic diastolic heart failure  -     Transthoracic echo (TTE) complete; Future  -     perflutren lipid microspheres (Definity) injection 0.5-10 mL of dilution  -     sulfur hexafluoride microsphr (Lumason) injection 24.28 mg  -     perflutren protein A microsphere (Optison) injection 0.5 mL  -     Insert and maintain peripheral IV; Standing  -     B-Type Natriuretic Peptide; Future  -      Comprehensive metabolic panel; Future  -     Lipid panel; Future  -     Follow Up In Cardiology; Future  -     ECG 12 Lead  Paroxysmal atrial fibrillation (Multi)  Primary hypertension      Assessment and plan (narrative):    Patrick Valdovinos is a 64 y.o. male with history of non-obstructive CAD, PAF s/p ablation, HTN uncontrolled, HPL on statin.  Will check ECHO for evaluation of LVEF.    CV plan by problem:     CAD: Patient has *** angina, class ***.  Current GDMT includes ***, which I will continue.  *** medications added/increased this visit.  Trend of condition is ***.    HPL: Patient has *** hyperlipidemia.  Patient is *** statin intolerant.  Current GDMT includes ***, which I will continue.  Trend of condition is ***.    HTN: Patient has *** HTN, *** controlled.  Current GDMT includes ***, which I will continue.  Goal BP is ***. *** medications added/increased this visit.  Trend of condition is ***.    CHF: Patient has ***  CHF, class ***.  Current GDMT includes *** which I will continue.  BP/HR/edema is *** controlled.  *** medications added/increased this visit.  Trend of condition is ***.    Afib: Patient has *** atrial fibrillation.  Current GDMT includes ***, which I will continue.  *** medications added/increased this visit.  Trend of condition is ***    Followup: ***    Erick Samaniego MD    Director of Interventional Cardiology  [facility]

## 2025-02-18 ENCOUNTER — APPOINTMENT (OUTPATIENT)
Dept: PRIMARY CARE | Facility: CLINIC | Age: 65
End: 2025-02-18
Payer: COMMERCIAL

## 2025-02-28 ENCOUNTER — APPOINTMENT (OUTPATIENT)
Dept: CARDIOLOGY | Facility: CLINIC | Age: 65
End: 2025-02-28
Payer: COMMERCIAL

## 2025-03-01 LAB
ALBUMIN SERPL-MCNC: 4.2 G/DL (ref 3.6–5.1)
ALP SERPL-CCNC: 59 U/L (ref 35–144)
ALT SERPL-CCNC: 8 U/L (ref 9–46)
ANION GAP SERPL CALCULATED.4IONS-SCNC: 15 MMOL/L (CALC) (ref 7–17)
AST SERPL-CCNC: 11 U/L (ref 10–35)
BILIRUB SERPL-MCNC: 0.4 MG/DL (ref 0.2–1.2)
BNP SERPL-MCNC: NORMAL PG/ML
BUN SERPL-MCNC: 28 MG/DL (ref 7–25)
CALCIUM SERPL-MCNC: 9.2 MG/DL (ref 8.6–10.3)
CHLORIDE SERPL-SCNC: 98 MMOL/L (ref 98–110)
CHOLEST SERPL-MCNC: 133 MG/DL
CHOLEST/HDLC SERPL: 3.2 (CALC)
CO2 SERPL-SCNC: 28 MMOL/L (ref 20–32)
CREAT SERPL-MCNC: 1.52 MG/DL (ref 0.7–1.35)
EGFRCR SERPLBLD CKD-EPI 2021: 51 ML/MIN/1.73M2
GLUCOSE SERPL-MCNC: 105 MG/DL (ref 65–99)
HDLC SERPL-MCNC: 41 MG/DL
LDLC SERPL CALC-MCNC: 69 MG/DL (CALC)
NONHDLC SERPL-MCNC: 92 MG/DL (CALC)
POTASSIUM SERPL-SCNC: 4 MMOL/L (ref 3.5–5.3)
PROT SERPL-MCNC: 7.6 G/DL (ref 6.1–8.1)
SODIUM SERPL-SCNC: 141 MMOL/L (ref 135–146)
TRIGL SERPL-MCNC: 142 MG/DL

## 2025-03-03 DIAGNOSIS — M25.461 EFFUSION, RIGHT KNEE: ICD-10-CM

## 2025-03-03 DIAGNOSIS — M25.462 EFFUSION, LEFT KNEE: ICD-10-CM

## 2025-03-03 LAB
ALBUMIN SERPL-MCNC: 4.2 G/DL (ref 3.6–5.1)
ALP SERPL-CCNC: 59 U/L (ref 35–144)
ALT SERPL-CCNC: 8 U/L (ref 9–46)
ANION GAP SERPL CALCULATED.4IONS-SCNC: 15 MMOL/L (CALC) (ref 7–17)
AST SERPL-CCNC: 11 U/L (ref 10–35)
BILIRUB SERPL-MCNC: 0.4 MG/DL (ref 0.2–1.2)
BNP SERPL-MCNC: 131 PG/ML
BUN SERPL-MCNC: 28 MG/DL (ref 7–25)
CALCIUM SERPL-MCNC: 9.2 MG/DL (ref 8.6–10.3)
CHLORIDE SERPL-SCNC: 98 MMOL/L (ref 98–110)
CHOLEST SERPL-MCNC: 133 MG/DL
CHOLEST/HDLC SERPL: 3.2 (CALC)
CO2 SERPL-SCNC: 28 MMOL/L (ref 20–32)
CREAT SERPL-MCNC: 1.52 MG/DL (ref 0.7–1.35)
EGFRCR SERPLBLD CKD-EPI 2021: 51 ML/MIN/1.73M2
GLUCOSE SERPL-MCNC: 105 MG/DL (ref 65–99)
HDLC SERPL-MCNC: 41 MG/DL
LDLC SERPL CALC-MCNC: 69 MG/DL (CALC)
NONHDLC SERPL-MCNC: 92 MG/DL (CALC)
POTASSIUM SERPL-SCNC: 4 MMOL/L (ref 3.5–5.3)
PROT SERPL-MCNC: 7.6 G/DL (ref 6.1–8.1)
SODIUM SERPL-SCNC: 141 MMOL/L (ref 135–146)
TRIGL SERPL-MCNC: 142 MG/DL

## 2025-03-03 RX ORDER — DICLOFENAC SODIUM 10 MG/G
4 GEL TOPICAL 2 TIMES DAILY PRN
Qty: 100 G | Refills: 3 | Status: SHIPPED | OUTPATIENT
Start: 2025-03-03

## 2025-03-13 ENCOUNTER — APPOINTMENT (OUTPATIENT)
Dept: CARDIOLOGY | Facility: CLINIC | Age: 65
End: 2025-03-13
Payer: COMMERCIAL

## 2025-03-19 ENCOUNTER — APPOINTMENT (OUTPATIENT)
Facility: CLINIC | Age: 65
End: 2025-03-19
Payer: COMMERCIAL

## 2025-03-20 ENCOUNTER — APPOINTMENT (OUTPATIENT)
Dept: ORTHOPEDIC SURGERY | Facility: CLINIC | Age: 65
End: 2025-03-20
Payer: COMMERCIAL

## 2025-04-01 ENCOUNTER — APPOINTMENT (OUTPATIENT)
Dept: CARDIOLOGY | Facility: CLINIC | Age: 65
End: 2025-04-01
Payer: COMMERCIAL

## 2025-04-08 ENCOUNTER — APPOINTMENT (OUTPATIENT)
Dept: PRIMARY CARE | Facility: CLINIC | Age: 65
End: 2025-04-08
Payer: COMMERCIAL

## 2025-04-10 DIAGNOSIS — R06.02 SHORTNESS OF BREATH: ICD-10-CM

## 2025-04-10 DIAGNOSIS — Z00.00 HEALTH CARE MAINTENANCE: ICD-10-CM

## 2025-04-10 DIAGNOSIS — K21.9 GASTROESOPHAGEAL REFLUX DISEASE, UNSPECIFIED WHETHER ESOPHAGITIS PRESENT: ICD-10-CM

## 2025-04-10 DIAGNOSIS — I10 HYPERTENSION, UNSPECIFIED TYPE: ICD-10-CM

## 2025-04-10 DIAGNOSIS — I48.91 ATRIAL FIBRILLATION, UNSPECIFIED TYPE (MULTI): ICD-10-CM

## 2025-04-11 RX ORDER — DOCUSATE SODIUM 100 MG/1
100 CAPSULE, LIQUID FILLED ORAL 2 TIMES DAILY
Qty: 60 CAPSULE | Refills: 5 | Status: SHIPPED | OUTPATIENT
Start: 2025-04-11

## 2025-04-11 RX ORDER — VALSARTAN 160 MG/1
160 TABLET ORAL DAILY
Qty: 30 TABLET | Refills: 1 | Status: SHIPPED | OUTPATIENT
Start: 2025-04-11

## 2025-04-15 ENCOUNTER — APPOINTMENT (OUTPATIENT)
Dept: CARDIOLOGY | Facility: CLINIC | Age: 65
End: 2025-04-15
Payer: COMMERCIAL

## 2025-04-15 RX ORDER — APIXABAN 5 MG/1
5 TABLET, FILM COATED ORAL 2 TIMES DAILY
Qty: 180 TABLET | Refills: 2 | Status: SHIPPED | OUTPATIENT
Start: 2025-04-15

## 2025-04-15 RX ORDER — PANTOPRAZOLE SODIUM 40 MG/1
40 TABLET, DELAYED RELEASE ORAL DAILY
Qty: 90 TABLET | Refills: 2 | Status: SHIPPED | OUTPATIENT
Start: 2025-04-15

## 2025-04-16 ENCOUNTER — APPOINTMENT (OUTPATIENT)
Facility: CLINIC | Age: 65
End: 2025-04-16
Payer: COMMERCIAL

## 2025-04-17 ENCOUNTER — APPOINTMENT (OUTPATIENT)
Dept: ORTHOPEDIC SURGERY | Facility: CLINIC | Age: 65
End: 2025-04-17
Payer: COMMERCIAL

## 2025-05-01 ENCOUNTER — APPOINTMENT (OUTPATIENT)
Dept: ORTHOPEDIC SURGERY | Facility: CLINIC | Age: 65
End: 2025-05-01
Payer: COMMERCIAL

## 2025-05-04 DIAGNOSIS — E78.5 HYPERLIPIDEMIA, UNSPECIFIED: ICD-10-CM

## 2025-05-05 RX ORDER — ATORVASTATIN CALCIUM 20 MG/1
20 TABLET, FILM COATED ORAL NIGHTLY
Qty: 30 TABLET | Refills: 3 | Status: SHIPPED | OUTPATIENT
Start: 2025-05-05

## 2025-05-07 ENCOUNTER — APPOINTMENT (OUTPATIENT)
Facility: CLINIC | Age: 65
End: 2025-05-07
Payer: COMMERCIAL

## 2025-05-07 ENCOUNTER — OFFICE VISIT (OUTPATIENT)
Dept: ORTHOPEDIC SURGERY | Facility: CLINIC | Age: 65
End: 2025-05-07
Payer: COMMERCIAL

## 2025-05-07 ENCOUNTER — HOSPITAL ENCOUNTER (OUTPATIENT)
Dept: RADIOLOGY | Facility: CLINIC | Age: 65
Discharge: HOME | End: 2025-05-07
Payer: COMMERCIAL

## 2025-05-07 DIAGNOSIS — M25.561 CHRONIC PAIN OF BOTH KNEES: Primary | ICD-10-CM

## 2025-05-07 DIAGNOSIS — M17.0 PRIMARY OSTEOARTHRITIS OF BOTH KNEES: ICD-10-CM

## 2025-05-07 DIAGNOSIS — G89.29 CHRONIC PAIN OF BOTH KNEES: ICD-10-CM

## 2025-05-07 DIAGNOSIS — M25.562 CHRONIC PAIN OF BOTH KNEES: Primary | ICD-10-CM

## 2025-05-07 DIAGNOSIS — M25.562 CHRONIC PAIN OF BOTH KNEES: ICD-10-CM

## 2025-05-07 DIAGNOSIS — G89.29 CHRONIC PAIN OF BOTH KNEES: Primary | ICD-10-CM

## 2025-05-07 DIAGNOSIS — M25.561 CHRONIC PAIN OF BOTH KNEES: ICD-10-CM

## 2025-05-07 PROCEDURE — 73562 X-RAY EXAM OF KNEE 3: CPT | Mod: 50

## 2025-05-07 PROCEDURE — 99213 OFFICE O/P EST LOW 20 MIN: CPT | Performed by: ORTHOPAEDIC SURGERY

## 2025-05-07 PROCEDURE — 99203 OFFICE O/P NEW LOW 30 MIN: CPT | Performed by: ORTHOPAEDIC SURGERY

## 2025-05-07 PROCEDURE — 1036F TOBACCO NON-USER: CPT | Performed by: ORTHOPAEDIC SURGERY

## 2025-05-07 NOTE — PROGRESS NOTES
Subjective    Patient ID: Patrick Valdovinos is a 64 y.o. male.    Chief Complaint: Pain of the Left Knee (NPV BL KNEE PAIN X YEARS NKI/PREV PT OF BENITO/GELS IN THE PAST WERE HELPFUL/CORTISONE IN THE PAST LED TO INFECTION) and Pain of the Right Knee     Last Surgery: No surgery found  Last Surgery Date: No surgery found    HPI  The patient is a 64-year-old male with a known history of bilateral knee arthritis.  He had been treated conservatively in the past by a nonoperative sports medicine physician with hyaluronic acid injections in the past.  He states that his right knee is more painful than the left.  He does not recall any specific trauma.  He states that in the past he has had a left knee Kenalog injection which led to infection in his left leg that required treatment with IV antibiotics and this is why he prefers no further Kenalog injections.  The patient is on Eliquis for atrial fibrillation.    Objective   Ortho Exam  The patient is a morbidly obese male in no acute distress.  He does use a cane to assist with ambulation.  He has a varus deformity to his right knee clinically.  There is crepitus with right knee range of motion.  There is no warmth erythema.  There is no evidence of infection.  He is tender more medially than laterally at the joint line.  The knee was stable to varus and valgus stress testing.    Exam of the patient's left knee reveals there was a mild effusion.  He is diffusely tender throughout the knee.  There was also crepitus with flexion extension the knee.  Knee was stable to varus and valgus stress testing.    Image Results:  X-rays of both his knees were reviewed.  His right knee has severe bone-on-bone arthritic changes in the medial compartment.  He is also nearly bone-on-bone in the patellofemoral compartment.  On the left knee he has nearly bone-on-bone medially and in the patellofemoral compartment.  There is no fracture or dislocation.  He does have what is likely  enchondroma's in his bilateral distal femurs.    Assessment/Plan   Encounter Diagnoses:  Chronic pain of both knees    Primary osteoarthritis of both knees    Orders Placed This Encounter    Cane adjustable single point    XR knee 3 views bilateral     The patient has bilateral knee arthritis.  He does not wish to undergo another Kenalog injection since he has had an infection in the past.  At this time he would like to be set up for hyaluronic acid injections again.  However the patient was informed given his morbid obesity that he needs to work on weight loss using exercise regimen that is low impact.  I stated to the patient this would also help with his overall cardiovascular health.  The patient will return to clinic once his gel injections have been obtained.

## 2025-05-21 ENCOUNTER — APPOINTMENT (OUTPATIENT)
Facility: CLINIC | Age: 65
End: 2025-05-21
Payer: COMMERCIAL

## 2025-05-27 ENCOUNTER — APPOINTMENT (OUTPATIENT)
Dept: PRIMARY CARE | Facility: CLINIC | Age: 65
End: 2025-05-27
Payer: COMMERCIAL

## 2025-05-31 ASSESSMENT — PROMIS GLOBAL HEALTH SCALE
RATE_AVERAGE_PAIN: 6
RATE_AVERAGE_FATIGUE: MILD
CARRYOUT_PHYSICAL_ACTIVITIES: MODERATELY
RATE_SOCIAL_SATISFACTION: GOOD
RATE_GENERAL_HEALTH: GOOD
EMOTIONAL_PROBLEMS: RARELY
RATE_MENTAL_HEALTH: GOOD
RATE_PHYSICAL_HEALTH: GOOD
RATE_QUALITY_OF_LIFE: GOOD
CARRYOUT_SOCIAL_ACTIVITIES: GOOD

## 2025-06-02 ENCOUNTER — HOME HEALTH ADMISSION (OUTPATIENT)
Dept: HOME HEALTH SERVICES | Facility: HOME HEALTH | Age: 65
End: 2025-06-02
Payer: COMMERCIAL

## 2025-06-03 ENCOUNTER — APPOINTMENT (OUTPATIENT)
Dept: PRIMARY CARE | Facility: CLINIC | Age: 65
End: 2025-06-03
Payer: COMMERCIAL

## 2025-06-09 ENCOUNTER — PATIENT OUTREACH (OUTPATIENT)
Dept: PRIMARY CARE | Facility: CLINIC | Age: 65
End: 2025-06-09
Payer: COMMERCIAL

## 2025-06-09 ENCOUNTER — HOME HEALTH ADMISSION (OUTPATIENT)
Dept: HOME HEALTH SERVICES | Facility: HOME HEALTH | Age: 65
End: 2025-06-09
Payer: COMMERCIAL

## 2025-06-09 RX ORDER — DOXYCYCLINE 100 MG/1
100 CAPSULE ORAL 2 TIMES DAILY
COMMUNITY
Start: 2025-06-07 | End: 2025-06-14

## 2025-06-09 RX ORDER — CEPHALEXIN 500 MG/1
500 CAPSULE ORAL 4 TIMES DAILY
COMMUNITY
Start: 2025-06-07 | End: 2025-06-14

## 2025-06-09 NOTE — PROGRESS NOTES
Discharge Facility: [facility]  Discharge Diagnosis: Cellulitis of both lower extremities  Admission Date: 5/31/25  Discharge Date: 6/7/25    PCP Appointment Date: None available. Task to office.   Specialist Appointment Date: 6/25/25 (Cardio)  Hospital Encounter and Summary Linked: Yes     Hospital Encounter   See discharge assessment below for further details    Wrap Up  Wrap Up Additional Comments: This CM spoke with pt via phone. Pt reports doing well at home since discharge. New meds reviewed. Pt denies CP and SOB. Pt aware of my availability for non-emergent concerns. Contact info provided to patient (6/9/2025 11:33 AM)    Engagement  Call Start Time: 1131 (6/9/2025 11:33 AM)    Medications  Medications reviewed with patient/caregiver?: Yes (6/9/2025 11:33 AM)  Is the patient having any side effects they believe may be caused by any medication additions or changes?: No (6/9/2025 11:33 AM)  Care Management Interventions: No intervention needed (6/9/2025 11:33 AM)  Prescription Comments: pt sent home with script (6/9/2025 11:33 AM)  Is the patient taking all medications as directed (includes completed medication regime)?: Yes (6/9/2025 11:33 AM)  Care Management Interventions: Provided patient education (6/9/2025 11:33 AM)  Medication Comments: Pt denies problems obtaining or affording medication (6/9/2025 11:33 AM)    Appointments  Does the patient have a primary care provider?: Yes (no available appts. task to office.) (6/9/2025 11:33 AM)  Care Management Interventions: Educated patient on importance of making appointment (6/9/2025 11:33 AM)  Has the patient kept scheduled appointments due by today?: Yes (6/9/2025 11:33 AM)  Care Management Interventions: Educated on importance of keeping appointment (6/9/2025 11:33 AM)    Self Management  What is the home health agency?: [facility] (6/9/2025 11:33 AM)  Has home health visited the patient within 72 hours of discharge?: No (6/9/2025 11:33  AM)    Patient Teaching  Does the patient have access to their discharge instructions?: Yes (6/9/2025 11:33 AM)  Care Management Interventions: Reviewed instructions with patient (6/9/2025 11:33 AM)  What is the patient's perception of their health status since discharge?: Improving (6/9/2025 11:33 AM)  Is the patient/caregiver able to teach back the hierarchy of who to call/visit for symptoms/problems? PCP, Specialist, Home Health nurse, Urgent Care, ED, 911: Yes (6/9/2025 11:33 AM)      Narendra Estrada LPN

## 2025-06-10 ENCOUNTER — DOCUMENTATION (OUTPATIENT)
Dept: HOME HEALTH SERVICES | Facility: HOME HEALTH | Age: 65
End: 2025-06-10
Payer: COMMERCIAL

## 2025-06-10 NOTE — HH CARE COORDINATION
Home Care received a Referral for Nursing and Physical Therapy. We have processed the referral for a Start of Care on 24-48 hrs.     If you have any questions or concerns, please feel free to contact us at [phone]. Follow the prompts, enter your five digit zip code, and you will be directed to your care team on [facility].

## 2025-06-11 ENCOUNTER — HOME CARE VISIT (OUTPATIENT)
Dept: HOME HEALTH SERVICES | Facility: HOME HEALTH | Age: 65
End: 2025-06-11
Payer: COMMERCIAL

## 2025-06-11 VITALS
OXYGEN SATURATION: 97 % | SYSTOLIC BLOOD PRESSURE: 118 MMHG | DIASTOLIC BLOOD PRESSURE: 62 MMHG | HEART RATE: 70 BPM | TEMPERATURE: 97.8 F | RESPIRATION RATE: 18 BRPM

## 2025-06-11 PROCEDURE — G0299 HHS/HOSPICE OF RN EA 15 MIN: HCPCS

## 2025-06-11 ASSESSMENT — ENCOUNTER SYMPTOMS
PAIN LOCATION - PAIN QUALITY: STABBING
PAIN LOCATION - PAIN FREQUENCY: INTERMITTENT
HIGHEST PAIN SEVERITY IN PAST 24 HOURS: 10/10
CHANGE IN APPETITE: UNCHANGED
PAIN LOCATION: LEFT LEG
PAIN SEVERITY GOAL: 0/10
PAIN: 1
APPETITE LEVEL: GOOD
PAIN LOCATION - PAIN SEVERITY: 10/10
PERSON REPORTING PAIN: PATIENT
SKIN LESIONS: 1
MUSCLE WEAKNESS: 1
LOWEST PAIN SEVERITY IN PAST 24 HOURS: 6/10

## 2025-06-11 ASSESSMENT — ACTIVITIES OF DAILY LIVING (ADL)
OASIS_M1830: 03
AMBULATION ASSISTANCE: STAND BY ASSIST
ENTERING_EXITING_HOME: STAND BY ASSIST
CURRENT_FUNCTION: STAND BY ASSIST

## 2025-06-12 ENCOUNTER — HOME CARE VISIT (OUTPATIENT)
Dept: HOME HEALTH SERVICES | Facility: HOME HEALTH | Age: 65
End: 2025-06-12
Payer: COMMERCIAL

## 2025-06-12 VITALS — DIASTOLIC BLOOD PRESSURE: 78 MMHG | SYSTOLIC BLOOD PRESSURE: 126 MMHG

## 2025-06-12 PROCEDURE — G0151 HHCP-SERV OF PT,EA 15 MIN: HCPCS

## 2025-06-12 ASSESSMENT — ENCOUNTER SYMPTOMS
LOWEST PAIN SEVERITY IN PAST 24 HOURS: 5/10
SUBJECTIVE PAIN PROGRESSION: UNCHANGED
PERSON REPORTING PAIN: PATIENT
HIGHEST PAIN SEVERITY IN PAST 24 HOURS: 10/10
PAIN LOCATION - PAIN SEVERITY: 10/10
OCCASIONAL FEELINGS OF UNSTEADINESS: 0
PAIN LOCATION: RIGHT KNEE
PAIN: 1
ARTHRALGIAS: 1
PAIN LOCATION: LEFT KNEE
PAIN LOCATION - PAIN SEVERITY: 5/10

## 2025-06-12 ASSESSMENT — ACTIVITIES OF DAILY LIVING (ADL): AMBULATION ASSISTANCE ON FLAT SURFACES: 1

## 2025-06-13 ENCOUNTER — HOME CARE VISIT (OUTPATIENT)
Dept: HOME HEALTH SERVICES | Facility: HOME HEALTH | Age: 65
End: 2025-06-13
Payer: COMMERCIAL

## 2025-06-17 ENCOUNTER — HOME CARE VISIT (OUTPATIENT)
Dept: HOME HEALTH SERVICES | Facility: HOME HEALTH | Age: 65
End: 2025-06-17
Payer: COMMERCIAL

## 2025-06-17 VITALS
SYSTOLIC BLOOD PRESSURE: 118 MMHG | RESPIRATION RATE: 18 BRPM | TEMPERATURE: 97.8 F | OXYGEN SATURATION: 95 % | DIASTOLIC BLOOD PRESSURE: 64 MMHG | HEART RATE: 99 BPM

## 2025-06-17 PROCEDURE — G0299 HHS/HOSPICE OF RN EA 15 MIN: HCPCS

## 2025-06-17 SDOH — ECONOMIC STABILITY: GENERAL

## 2025-06-17 ASSESSMENT — ENCOUNTER SYMPTOMS
PAIN: 1
PAIN LOCATION - PAIN QUALITY: THROBBING
MUSCLE WEAKNESS: 1
PAIN SEVERITY GOAL: 0/10
SKIN LESIONS: 1
APPETITE LEVEL: GOOD
PERSON REPORTING PAIN: PATIENT
PAIN LOCATION - PAIN SEVERITY: 9/10
LOWEST PAIN SEVERITY IN PAST 24 HOURS: 5/10
PAIN LOCATION - PAIN SEVERITY: 9/10
CHANGE IN APPETITE: UNCHANGED
PAIN LOCATION: RIGHT LEG
PAIN LOCATION - PAIN QUALITY: THROBBING
PAIN LOCATION: LEFT LEG
HIGHEST PAIN SEVERITY IN PAST 24 HOURS: 9/10
SUBJECTIVE PAIN PROGRESSION: UNCHANGED
LOWER EXTREMITY EDEMA: 1
PAIN LOCATION - PAIN FREQUENCY: INTERMITTENT
PAIN LOCATION - PAIN FREQUENCY: INTERMITTENT

## 2025-06-17 ASSESSMENT — ACTIVITIES OF DAILY LIVING (ADL)
MONEY MANAGEMENT (EXPENSES/BILLS): INDEPENDENT
AMBULATION ASSISTANCE: STAND BY ASSIST
CURRENT_FUNCTION: STAND BY ASSIST

## 2025-06-18 ENCOUNTER — HOME CARE VISIT (OUTPATIENT)
Dept: HOME HEALTH SERVICES | Facility: HOME HEALTH | Age: 65
End: 2025-06-18
Payer: COMMERCIAL

## 2025-06-18 ENCOUNTER — APPOINTMENT (OUTPATIENT)
Facility: CLINIC | Age: 65
End: 2025-06-18
Payer: COMMERCIAL

## 2025-06-20 ENCOUNTER — PATIENT OUTREACH (OUTPATIENT)
Age: 65
End: 2025-06-20
Payer: COMMERCIAL

## 2025-06-20 ENCOUNTER — HOME CARE VISIT (OUTPATIENT)
Dept: HOME HEALTH SERVICES | Facility: HOME HEALTH | Age: 65
End: 2025-06-20
Payer: COMMERCIAL

## 2025-06-20 VITALS
RESPIRATION RATE: 18 BRPM | OXYGEN SATURATION: 95 % | DIASTOLIC BLOOD PRESSURE: 63 MMHG | SYSTOLIC BLOOD PRESSURE: 101 MMHG | HEART RATE: 90 BPM | TEMPERATURE: 97 F

## 2025-06-20 PROCEDURE — G0300 HHS/HOSPICE OF LPN EA 15 MIN: HCPCS

## 2025-06-20 ASSESSMENT — ENCOUNTER SYMPTOMS
PAIN LOCATION - PAIN QUALITY: ACHE
PAIN: 1
PAIN LOCATION - PAIN SEVERITY: 7/10
DYSPNEA ON EXERTION: 1
APPETITE LEVEL: GOOD
LIMITED RANGE OF MOTION: 1
PAIN LOCATION: RIGHT KNEE
FATIGUES EASILY: 1
CHANGE IN APPETITE: UNCHANGED
PERSON REPORTING PAIN: PATIENT
MUSCLE WEAKNESS: 1

## 2025-06-20 ASSESSMENT — ACTIVITIES OF DAILY LIVING (ADL)
AMBULATION ASSISTANCE: INDEPENDENT
TOILETING: INDEPENDENT
DRESSING_LB_CURRENT_FUNCTION: INDEPENDENT
FEEDING: INDEPENDENT
BATHING ASSESSED: 1
AMBULATION ASSISTANCE: 1
FEEDING ASSESSED: 1
BATHING_CURRENT_FUNCTION: STAND BY ASSIST
DRESSING_UB_CURRENT_FUNCTION: INDEPENDENT
TOILETING: 1

## 2025-06-20 NOTE — PROGRESS NOTES
Unable to reach patient for follow up call after recent hospitalization.   Left voicemail with call back number for patient to call if needed   If no voicemail available call attempts x 2 were made to contact the patient to assist with any questions or concerns patient may have.    Narendra Estrada LPN

## 2025-06-25 ENCOUNTER — APPOINTMENT (OUTPATIENT)
Facility: CLINIC | Age: 65
End: 2025-06-25
Payer: COMMERCIAL

## 2025-06-27 ENCOUNTER — HOME CARE VISIT (OUTPATIENT)
Dept: HOME HEALTH SERVICES | Facility: HOME HEALTH | Age: 65
End: 2025-06-27
Payer: COMMERCIAL

## 2025-07-03 ENCOUNTER — HOME CARE VISIT (OUTPATIENT)
Dept: HOME HEALTH SERVICES | Facility: HOME HEALTH | Age: 65
End: 2025-07-03
Payer: COMMERCIAL

## 2025-07-03 NOTE — CASE COMMUNICATION
Patient called this nurse on my way to his house for scheduled homecare visit, patient in severe pain and would like to cancel today's visit.

## 2025-07-05 DIAGNOSIS — I10 ESSENTIAL (PRIMARY) HYPERTENSION: ICD-10-CM

## 2025-07-07 RX ORDER — TORSEMIDE 20 MG/1
40 TABLET ORAL 2 TIMES DAILY
Qty: 120 TABLET | Refills: 3 | Status: SHIPPED | OUTPATIENT
Start: 2025-07-07

## 2025-07-07 ASSESSMENT — PROMIS GLOBAL HEALTH SCALE
RATE_GENERAL_HEALTH: GOOD
CARRYOUT_SOCIAL_ACTIVITIES: VERY GOOD
RATE_AVERAGE_PAIN: 7
RATE_MENTAL_HEALTH: GOOD
EMOTIONAL_PROBLEMS: RARELY
RATE_PHYSICAL_HEALTH: GOOD
RATE_QUALITY_OF_LIFE: GOOD
CARRYOUT_PHYSICAL_ACTIVITIES: MODERATELY
RATE_SOCIAL_SATISFACTION: GOOD

## 2025-07-08 ENCOUNTER — OFFICE VISIT (OUTPATIENT)
Dept: PRIMARY CARE | Facility: CLINIC | Age: 65
End: 2025-07-08
Payer: COMMERCIAL

## 2025-07-08 ENCOUNTER — APPOINTMENT (OUTPATIENT)
Dept: PRIMARY CARE | Facility: CLINIC | Age: 65
End: 2025-07-08
Payer: COMMERCIAL

## 2025-07-08 VITALS
HEIGHT: 72 IN | BODY MASS INDEX: 41.34 KG/M2 | DIASTOLIC BLOOD PRESSURE: 81 MMHG | OXYGEN SATURATION: 96 % | WEIGHT: 305.2 LBS | HEART RATE: 79 BPM | TEMPERATURE: 96.6 F | SYSTOLIC BLOOD PRESSURE: 131 MMHG

## 2025-07-08 DIAGNOSIS — Z12.5 PROSTATE CANCER SCREENING: Primary | ICD-10-CM

## 2025-07-08 DIAGNOSIS — Z00.00 WELL ADULT EXAM: ICD-10-CM

## 2025-07-08 DIAGNOSIS — M25.461 EFFUSION, RIGHT KNEE: ICD-10-CM

## 2025-07-08 DIAGNOSIS — I50.32 CHRONIC HEART FAILURE WITH PRESERVED EJECTION FRACTION: ICD-10-CM

## 2025-07-08 DIAGNOSIS — E78.2 MIXED HYPERLIPIDEMIA: ICD-10-CM

## 2025-07-08 DIAGNOSIS — E03.9 ADULT HYPOTHYROIDISM: ICD-10-CM

## 2025-07-08 DIAGNOSIS — K21.9 GASTROESOPHAGEAL REFLUX DISEASE, UNSPECIFIED WHETHER ESOPHAGITIS PRESENT: ICD-10-CM

## 2025-07-08 DIAGNOSIS — I10 PRIMARY HYPERTENSION: ICD-10-CM

## 2025-07-08 DIAGNOSIS — M15.0 PRIMARY OSTEOARTHRITIS INVOLVING MULTIPLE JOINTS: ICD-10-CM

## 2025-07-08 DIAGNOSIS — Z87.891 FORMER SMOKER: ICD-10-CM

## 2025-07-08 DIAGNOSIS — I48.0 PAROXYSMAL ATRIAL FIBRILLATION (MULTI): ICD-10-CM

## 2025-07-08 DIAGNOSIS — M25.462 EFFUSION, LEFT KNEE: ICD-10-CM

## 2025-07-08 PROCEDURE — 99396 PREV VISIT EST AGE 40-64: CPT | Performed by: STUDENT IN AN ORGANIZED HEALTH CARE EDUCATION/TRAINING PROGRAM

## 2025-07-08 PROCEDURE — 99214 OFFICE O/P EST MOD 30 MIN: CPT | Performed by: STUDENT IN AN ORGANIZED HEALTH CARE EDUCATION/TRAINING PROGRAM

## 2025-07-08 PROCEDURE — 1036F TOBACCO NON-USER: CPT | Performed by: STUDENT IN AN ORGANIZED HEALTH CARE EDUCATION/TRAINING PROGRAM

## 2025-07-08 PROCEDURE — 3075F SYST BP GE 130 - 139MM HG: CPT | Performed by: STUDENT IN AN ORGANIZED HEALTH CARE EDUCATION/TRAINING PROGRAM

## 2025-07-08 PROCEDURE — 3008F BODY MASS INDEX DOCD: CPT | Performed by: STUDENT IN AN ORGANIZED HEALTH CARE EDUCATION/TRAINING PROGRAM

## 2025-07-08 PROCEDURE — 3079F DIAST BP 80-89 MM HG: CPT | Performed by: STUDENT IN AN ORGANIZED HEALTH CARE EDUCATION/TRAINING PROGRAM

## 2025-07-08 RX ORDER — PANTOPRAZOLE SODIUM 20 MG/1
20 TABLET, DELAYED RELEASE ORAL DAILY
Qty: 90 TABLET | Refills: 3 | Status: SHIPPED | OUTPATIENT
Start: 2025-07-08

## 2025-07-08 RX ORDER — DICLOFENAC SODIUM 10 MG/G
4 GEL TOPICAL 2 TIMES DAILY PRN
Qty: 100 G | Refills: 3 | Status: SHIPPED | OUTPATIENT
Start: 2025-07-08

## 2025-07-08 ASSESSMENT — ENCOUNTER SYMPTOMS
DEPRESSION: 0
LOSS OF SENSATION IN FEET: 0
OCCASIONAL FEELINGS OF UNSTEADINESS: 0

## 2025-07-08 ASSESSMENT — PAIN SCALES - GENERAL: PAINLEVEL_OUTOF10: 10-WORST PAIN EVER

## 2025-07-08 NOTE — PROGRESS NOTES
Subjective   Patient ID: Patrick Valdovinos is a 64 y.o. male who presents for Annual Exam, Back Pain, and Knee Pain.  Patrick is here for hospital follow up and annual physical.    He was admitted after a fall for leg wounds and cellulitis at [facility] from 5/31/25-6/7/25. He is feeling improved since discharge, he has been following with wound care weekly. Legs are much improved. Completed course of antibiotics shortly after discharge.    Eating well, drinking plenty of water. Regularly urinating and having bowel movements. Working to continue to lose weight so that he may be a candidate for knee replacement. Following with Orthopedics for injections in the interim.    Taking all of his medications as prescribed without issue.    Review of Systems  12-point ROS was reviewed and is negative, unless otherwise noted in HPI    Objective   Vitals:    07/08/25 1312   BP: 131/81   Pulse: 79   Temp: 35.9 °C (96.6 °F)   SpO2: 96%      Physical Exam  GEN: alert, conversant, NAD  HEENT: PERRL, EOMI, MMM, Tms pearly gray bilaterally  NECK: supple, no LAD appreciated  CHEST: CTAB  CV: S1, S2, RRR, no murmurs appreciated  ABD: soft, NT, ND  EXT: no significant LE edema  SKIN: warm, dry    Assessment/Plan   #well adult  - Counseled continued efforts on healthy lifestyle modification including balanced diet, and continued exercise for >5 minutes  - counseled age appropriate vaccines and preventative measures    #mechanical trip and fall  #leg wounds s/p cellulitis  #transitional care management  -Reviewed recent hospitalization, including: labwork, imaging, documentation and reconciled current medications with patient  - continue with home care    #HFpEF  #HTN  #DLD  #PAF  #CKDIII  Plan:  - Following regularly with Cardiology, Dr. Najera  - Continue Valsartan 160mg daily, Atorvastatin 20mg daily, Torsemide 20mg daily, Metoprolol 50mg daily, Eliquis 5mg BID.    #GERD  No recent issues  - continue pantoprazole, reduce to 20mg  daily    #primary OA, multiple joints  - Following with Orthopedics  Has had Euflexxa Injections regularly q6 months    #Obesity, Class III  #increased risk for developing diabetes  - Counseled continued efforts on lifestyle modification including weight loss, diet, and increased exercise for >5 minutes    #Hypothyroidism  Recent TSH reviewed  - Continue Levothyroxine 100mcg daily    #Former smoker  Quit in 2018. >30 pack year hx  - LDCT ordered at last visit, reordered today    Health Maintenance:  Vaccines: COVID (UTD), Flu (UTD), TDAP (2025), Shingles (x2), Pneumonia (advised), RSV (advised)  Screening: Colonoscopy (2020 per patient), LDCT (ordered previously)  Labs: ordered today, to be obtained in 1 month     RTC in 6 months, or sooner PRN    Osmel Cornelius DO  Patient was identified as a fall risk. Risk prevention instructions provided.   yes no

## 2025-07-09 ENCOUNTER — HOME CARE VISIT (OUTPATIENT)
Dept: HOME HEALTH SERVICES | Facility: HOME HEALTH | Age: 65
End: 2025-07-09
Payer: COMMERCIAL

## 2025-07-09 NOTE — CASE COMMUNICATION
Patient returned call/text this morning, states there is no time today that he can be seen for wound care. Patient has several SN missed visits.

## 2025-07-16 ENCOUNTER — APPOINTMENT (OUTPATIENT)
Facility: CLINIC | Age: 65
End: 2025-07-16
Payer: COMMERCIAL

## 2025-07-16 VITALS
OXYGEN SATURATION: 92 % | HEIGHT: 72 IN | BODY MASS INDEX: 41.31 KG/M2 | DIASTOLIC BLOOD PRESSURE: 68 MMHG | HEART RATE: 78 BPM | WEIGHT: 305 LBS | SYSTOLIC BLOOD PRESSURE: 107 MMHG

## 2025-07-16 DIAGNOSIS — I50.32 CHRONIC DIASTOLIC HEART FAILURE: ICD-10-CM

## 2025-07-16 DIAGNOSIS — I87.8 VENOUS STASIS: ICD-10-CM

## 2025-07-16 DIAGNOSIS — N28.9 RENAL INSUFFICIENCY: Primary | ICD-10-CM

## 2025-07-16 PROCEDURE — 99213 OFFICE O/P EST LOW 20 MIN: CPT | Performed by: INTERNAL MEDICINE

## 2025-07-16 PROCEDURE — 3008F BODY MASS INDEX DOCD: CPT | Performed by: INTERNAL MEDICINE

## 2025-07-16 PROCEDURE — 3078F DIAST BP <80 MM HG: CPT | Performed by: INTERNAL MEDICINE

## 2025-07-16 PROCEDURE — 3074F SYST BP LT 130 MM HG: CPT | Performed by: INTERNAL MEDICINE

## 2025-07-16 NOTE — PROGRESS NOTES
[facility]     Cardiology Office Follow-up: Follow-up    Patient previously seen for afib s/p ablation, non-obstructive CAD, HTN, HPL, chronic diastolic CHF, leg swelling, MARCEL.    At the previous encounter, the patient was seen in followup.    After the encounter the patient completed the following testing: echo at [facility] EF 60%, no valve disease, indeterminate diastology.    There were some interval changes in medical history before this appointment.  Patient was admitted to [facility] for skin abrasion, became like cellulitis, leg swelling and concern for CHF.    Today the patient reports: doing well with no symptoms    Additional recent non-cardiac testing includes: none    ROS: Remainder of 12 review of systems is negative aside from chief complaint.    PHYSICAL EXAM  Vitals:    07/16/25 0904   BP: 107/68   BP Location: Left arm   Patient Position: Sitting   Pulse: 78   SpO2: 92%   Weight: 138 kg (305 lb)   Height: 1.829 m (6')     General: No acute distress, appears comfortable  Cardiac: Regular rate and rhythm with no murmurs rubs or gallops, normal S1-S2  Pulmonary: Clear to auscultation bilaterally with no rales or rhonchi, normal respiratory effort  Gastrointestinal: Soft abdomen with no tenderness or guarding, no rebound  Musculoskeletal: No lower extremity edema, normal  Neurological: Alert and oriented x 4, appropriate, moving all extremities well, normal affect  Psychiatric: Normal affect, mood appropriate to occasion  Skin: No rashes, hives or jaundice  HEENT: Normocephalic, atraumatic.  Pupils equal round and reactive.    Assessment/Plan   Diagnoses and all orders for this visit:  Renal insufficiency  -     Referral to Nephrology; Future  Chronic diastolic heart failure  -     Follow Up In Cardiology  -     Basic metabolic panel; Future  -     B-Type Natriuretic Peptide; Future  -     Follow Up In Cardiology; Future  Venous stasis  -     Referral to Vascular Surgery;  Future      Assessment and plan (narrative):    Patrick Valdovinos is a 64 y.o. male afib s/p ablation, non-obstructive CAD, HTN, HPL, chronic diastolic CHF, leg swelling, MARCEL.  Check labs, refer to nephrology and vascular surgery    Followup: 1 month    Erick Samaniego MD    Director of Interventional Cardiology  [facility]

## 2025-07-18 ENCOUNTER — HOME CARE VISIT (OUTPATIENT)
Dept: HOME HEALTH SERVICES | Facility: HOME HEALTH | Age: 65
End: 2025-07-18
Payer: COMMERCIAL

## 2025-07-23 ENCOUNTER — HOME CARE VISIT (OUTPATIENT)
Dept: HOME HEALTH SERVICES | Facility: HOME HEALTH | Age: 65
End: 2025-07-23
Payer: COMMERCIAL

## 2025-07-24 ENCOUNTER — APPOINTMENT (OUTPATIENT)
Dept: HOME HEALTH SERVICES | Facility: HOME HEALTH | Age: 65
End: 2025-07-24
Payer: COMMERCIAL

## 2025-07-24 DIAGNOSIS — I10 ESSENTIAL (PRIMARY) HYPERTENSION: ICD-10-CM

## 2025-07-25 RX ORDER — METOPROLOL SUCCINATE 50 MG/1
50 TABLET, EXTENDED RELEASE ORAL DAILY
Qty: 90 TABLET | Refills: 3 | Status: SHIPPED | OUTPATIENT
Start: 2025-07-25

## 2025-07-30 ENCOUNTER — HOME CARE VISIT (OUTPATIENT)
Dept: HOME HEALTH SERVICES | Facility: HOME HEALTH | Age: 65
End: 2025-07-30
Payer: COMMERCIAL

## 2025-07-30 VITALS
RESPIRATION RATE: 18 BRPM | HEART RATE: 71 BPM | OXYGEN SATURATION: 95 % | SYSTOLIC BLOOD PRESSURE: 128 MMHG | TEMPERATURE: 97.7 F | DIASTOLIC BLOOD PRESSURE: 78 MMHG

## 2025-07-30 PROCEDURE — G0300 HHS/HOSPICE OF LPN EA 15 MIN: HCPCS

## 2025-07-30 SDOH — ECONOMIC STABILITY: GENERAL

## 2025-07-30 ASSESSMENT — ENCOUNTER SYMPTOMS
CHANGE IN APPETITE: UNCHANGED
APPETITE LEVEL: GOOD
PAIN LOCATION: GENERALIZED
PAIN: 1
PAIN LOCATION - PAIN SEVERITY: 6/10
PAIN LOCATION - RELIEVING FACTORS: TYLENOL
PAIN LOCATION - PAIN QUALITY: ACHING

## 2025-07-30 ASSESSMENT — ACTIVITIES OF DAILY LIVING (ADL): MONEY MANAGEMENT (EXPENSES/BILLS): INDEPENDENT

## 2025-08-01 DIAGNOSIS — M17.0 PRIMARY OSTEOARTHRITIS OF BOTH KNEES: ICD-10-CM

## 2025-08-01 RX ORDER — HYALURONATE SODIUM 20 MG/2 ML
20 SYRINGE (ML) INTRAARTICULAR
Qty: 12 ML | Refills: 0 | Status: SHIPPED | OUTPATIENT
Start: 2025-08-01 | End: 2025-09-06

## 2025-08-09 ENCOUNTER — HOME CARE VISIT (OUTPATIENT)
Dept: HOME HEALTH SERVICES | Facility: HOME HEALTH | Age: 65
End: 2025-08-09
Payer: COMMERCIAL

## 2025-08-20 ENCOUNTER — OFFICE VISIT (OUTPATIENT)
Dept: ORTHOPEDIC SURGERY | Facility: CLINIC | Age: 65
End: 2025-08-20
Payer: COMMERCIAL

## 2025-08-20 ENCOUNTER — APPOINTMENT (OUTPATIENT)
Dept: CARDIOLOGY | Facility: CLINIC | Age: 65
End: 2025-08-20
Payer: COMMERCIAL

## 2025-08-20 DIAGNOSIS — M17.0 PRIMARY OSTEOARTHRITIS OF BOTH KNEES: Primary | ICD-10-CM

## 2025-08-20 PROCEDURE — 2500000004 HC RX 250 GENERAL PHARMACY W/ HCPCS (ALT 636 FOR OP/ED): Mod: JZ | Performed by: ORTHOPAEDIC SURGERY

## 2025-08-20 PROCEDURE — 20610 DRAIN/INJ JOINT/BURSA W/O US: CPT | Mod: 50 | Performed by: ORTHOPAEDIC SURGERY

## 2025-08-20 PROCEDURE — 99212 OFFICE O/P EST SF 10 MIN: CPT | Mod: 25 | Performed by: ORTHOPAEDIC SURGERY

## 2025-08-20 RX ADMIN — Medication 20 MG: at 14:42

## 2025-08-20 ASSESSMENT — ENCOUNTER SYMPTOMS
KNEE DEFORMITY: 1
KNEE SWELLING: 1

## 2025-08-20 ASSESSMENT — ACTIVITIES OF DAILY LIVING (ADL)
OASIS_M1830: 03
HOME_HEALTH_OASIS: 01

## 2025-08-21 ENCOUNTER — APPOINTMENT (OUTPATIENT)
Dept: OPHTHALMOLOGY | Facility: CLINIC | Age: 65
End: 2025-08-21
Payer: COMMERCIAL

## 2025-08-27 ENCOUNTER — OFFICE VISIT (OUTPATIENT)
Dept: ORTHOPEDIC SURGERY | Facility: CLINIC | Age: 65
End: 2025-08-27
Payer: COMMERCIAL

## 2025-08-27 VITALS — BODY MASS INDEX: 41.31 KG/M2 | WEIGHT: 305 LBS | HEIGHT: 72 IN

## 2025-08-27 DIAGNOSIS — M17.0 PRIMARY OSTEOARTHRITIS OF BOTH KNEES: Primary | ICD-10-CM

## 2025-08-27 PROCEDURE — 1036F TOBACCO NON-USER: CPT | Performed by: ORTHOPAEDIC SURGERY

## 2025-08-27 PROCEDURE — 20610 DRAIN/INJ JOINT/BURSA W/O US: CPT | Mod: 50 | Performed by: ORTHOPAEDIC SURGERY

## 2025-08-27 PROCEDURE — 3008F BODY MASS INDEX DOCD: CPT | Performed by: ORTHOPAEDIC SURGERY

## 2025-08-27 PROCEDURE — 2500000004 HC RX 250 GENERAL PHARMACY W/ HCPCS (ALT 636 FOR OP/ED): Mod: JZ | Performed by: ORTHOPAEDIC SURGERY

## 2025-08-27 RX ADMIN — Medication 20 MG: at 14:03

## 2025-09-02 DIAGNOSIS — R06.02 SHORTNESS OF BREATH: ICD-10-CM

## 2025-09-02 DIAGNOSIS — I10 HYPERTENSION, UNSPECIFIED TYPE: ICD-10-CM

## 2025-09-02 RX ORDER — VALSARTAN 160 MG/1
160 TABLET ORAL DAILY
Qty: 90 TABLET | Refills: 1 | Status: SHIPPED | OUTPATIENT
Start: 2025-09-02

## 2025-09-03 ENCOUNTER — APPOINTMENT (OUTPATIENT)
Dept: VASCULAR SURGERY | Facility: CLINIC | Age: 65
End: 2025-09-03
Payer: COMMERCIAL

## 2025-09-03 ENCOUNTER — APPOINTMENT (OUTPATIENT)
Dept: ORTHOPEDIC SURGERY | Facility: CLINIC | Age: 65
End: 2025-09-03
Payer: COMMERCIAL

## 2025-09-03 ENCOUNTER — APPOINTMENT (OUTPATIENT)
Dept: ORTHOPEDIC SURGERY | Facility: CLINIC | Age: 65
End: 2025-09-03
Payer: MEDICAID

## 2025-09-03 ENCOUNTER — OFFICE VISIT (OUTPATIENT)
Dept: ORTHOPEDIC SURGERY | Facility: CLINIC | Age: 65
End: 2025-09-03
Payer: MEDICAID

## 2025-09-03 DIAGNOSIS — M17.0 PRIMARY OSTEOARTHRITIS OF BOTH KNEES: Primary | ICD-10-CM

## 2025-09-03 PROCEDURE — 1036F TOBACCO NON-USER: CPT | Performed by: ORTHOPAEDIC SURGERY

## 2025-09-03 PROCEDURE — 99212 OFFICE O/P EST SF 10 MIN: CPT | Mod: 25 | Performed by: ORTHOPAEDIC SURGERY

## 2025-09-03 PROCEDURE — 2500000004 HC RX 250 GENERAL PHARMACY W/ HCPCS (ALT 636 FOR OP/ED): Mod: JZ | Performed by: ORTHOPAEDIC SURGERY

## 2025-09-03 PROCEDURE — 20610 DRAIN/INJ JOINT/BURSA W/O US: CPT | Mod: 50 | Performed by: ORTHOPAEDIC SURGERY

## 2025-09-03 RX ADMIN — Medication 20 MG: at 14:04

## 2025-09-03 ASSESSMENT — ENCOUNTER SYMPTOMS
KNEE DEFORMITY: 1
KNEE SWELLING: 1

## 2025-09-09 ENCOUNTER — APPOINTMENT (OUTPATIENT)
Dept: NEPHROLOGY | Facility: CLINIC | Age: 65
End: 2025-09-09
Payer: COMMERCIAL

## 2025-09-10 ENCOUNTER — APPOINTMENT (OUTPATIENT)
Dept: ORTHOPEDIC SURGERY | Facility: CLINIC | Age: 65
End: 2025-09-10
Payer: COMMERCIAL

## 2025-09-17 ENCOUNTER — APPOINTMENT (OUTPATIENT)
Dept: CARDIOLOGY | Facility: CLINIC | Age: 65
End: 2025-09-17
Payer: COMMERCIAL

## 2026-01-13 ENCOUNTER — APPOINTMENT (OUTPATIENT)
Dept: PRIMARY CARE | Facility: CLINIC | Age: 66
End: 2026-01-13
Payer: COMMERCIAL

## 2026-01-15 ENCOUNTER — APPOINTMENT (OUTPATIENT)
Dept: OPHTHALMOLOGY | Facility: CLINIC | Age: 66
End: 2026-01-15
Payer: COMMERCIAL